# Patient Record
Sex: MALE | Race: WHITE | NOT HISPANIC OR LATINO | Employment: OTHER | URBAN - METROPOLITAN AREA
[De-identification: names, ages, dates, MRNs, and addresses within clinical notes are randomized per-mention and may not be internally consistent; named-entity substitution may affect disease eponyms.]

---

## 2018-12-31 ENCOUNTER — APPOINTMENT (OUTPATIENT)
Dept: RADIOLOGY | Facility: MEDICAL CENTER | Age: 83
DRG: 065 | End: 2018-12-31
Attending: PSYCHIATRY & NEUROLOGY
Payer: MEDICARE

## 2018-12-31 ENCOUNTER — APPOINTMENT (OUTPATIENT)
Dept: RADIOLOGY | Facility: MEDICAL CENTER | Age: 83
DRG: 065 | End: 2018-12-31
Attending: EMERGENCY MEDICINE
Payer: MEDICARE

## 2018-12-31 ENCOUNTER — HOSPITAL ENCOUNTER (INPATIENT)
Facility: MEDICAL CENTER | Age: 83
LOS: 4 days | DRG: 065 | End: 2019-01-04
Attending: EMERGENCY MEDICINE | Admitting: HOSPITALIST
Payer: MEDICARE

## 2018-12-31 ENCOUNTER — APPOINTMENT (OUTPATIENT)
Dept: RADIOLOGY | Facility: MEDICAL CENTER | Age: 83
DRG: 065 | End: 2018-12-31
Payer: MEDICARE

## 2018-12-31 DIAGNOSIS — I62.9 INTRACRANIAL BLEEDING (HCC): ICD-10-CM

## 2018-12-31 DIAGNOSIS — D69.6 THROMBOCYTOPENIA (HCC): ICD-10-CM

## 2018-12-31 DIAGNOSIS — I62.9 INTRACRANIAL HEMORRHAGE (HCC): ICD-10-CM

## 2018-12-31 DIAGNOSIS — R40.4 ALTERED LEVEL OF CONSCIOUSNESS: ICD-10-CM

## 2018-12-31 PROBLEM — E80.6 HYPERBILIRUBINEMIA: Status: ACTIVE | Noted: 2018-12-31

## 2018-12-31 PROBLEM — E78.5 DYSLIPIDEMIA: Status: ACTIVE | Noted: 2018-12-31

## 2018-12-31 PROBLEM — K21.9 GERD (GASTROESOPHAGEAL REFLUX DISEASE): Status: ACTIVE | Noted: 2018-12-31

## 2018-12-31 LAB
ABO GROUP BLD: NORMAL
ABO GROUP BLD: NORMAL
ALBUMIN SERPL BCP-MCNC: 4.7 G/DL (ref 3.2–4.9)
ALBUMIN/GLOB SERPL: 1.9 G/DL
ALP SERPL-CCNC: 56 U/L (ref 30–99)
ALT SERPL-CCNC: 22 U/L (ref 2–50)
ANION GAP SERPL CALC-SCNC: 7 MMOL/L (ref 0–11.9)
APPEARANCE UR: CLEAR
APTT PPP: 37.3 SEC (ref 24.7–36)
AST SERPL-CCNC: 12 U/L (ref 12–45)
BASOPHILS # BLD AUTO: 0.3 % (ref 0–1.8)
BASOPHILS # BLD: 0.02 K/UL (ref 0–0.12)
BILIRUB SERPL-MCNC: 2.7 MG/DL (ref 0.1–1.5)
BILIRUB UR QL STRIP.AUTO: NEGATIVE
BLD GP AB SCN SERPL QL: NORMAL
BUN SERPL-MCNC: 17 MG/DL (ref 8–22)
CALCIUM SERPL-MCNC: 9.6 MG/DL (ref 8.5–10.5)
CFT BLD TEG: 6.8 MIN (ref 5–10)
CHLORIDE SERPL-SCNC: 104 MMOL/L (ref 96–112)
CHOLEST SERPL-MCNC: 114 MG/DL (ref 100–199)
CLOT ANGLE BLD TEG: 49.8 DEGREES (ref 53–72)
CLOT LYSIS 30M P MA LENFR BLD TEG: 0 % (ref 0–8)
CO2 SERPL-SCNC: 29 MMOL/L (ref 20–33)
COLOR UR: YELLOW
CREAT SERPL-MCNC: 0.94 MG/DL (ref 0.5–1.4)
CT.EXTRINSIC BLD ROTEM: 3.5 MIN (ref 1–3)
EKG IMPRESSION: NORMAL
EOSINOPHIL # BLD AUTO: 0.01 K/UL (ref 0–0.51)
EOSINOPHIL NFR BLD: 0.2 % (ref 0–6.9)
ERYTHROCYTE [DISTWIDTH] IN BLOOD BY AUTOMATED COUNT: 40.6 FL (ref 35.9–50)
GLOBULIN SER CALC-MCNC: 2.5 G/DL (ref 1.9–3.5)
GLUCOSE BLD-MCNC: 109 MG/DL (ref 65–99)
GLUCOSE SERPL-MCNC: 105 MG/DL (ref 65–99)
GLUCOSE UR STRIP.AUTO-MCNC: NEGATIVE MG/DL
HCT VFR BLD AUTO: 50.3 % (ref 42–52)
HDLC SERPL-MCNC: 37 MG/DL
HGB BLD-MCNC: 17.2 G/DL (ref 14–18)
IMM GRANULOCYTES # BLD AUTO: 0.05 K/UL (ref 0–0.11)
IMM GRANULOCYTES NFR BLD AUTO: 0.9 % (ref 0–0.9)
INR PPP: 1.13 (ref 0.87–1.13)
KETONES UR STRIP.AUTO-MCNC: NEGATIVE MG/DL
LDLC SERPL CALC-MCNC: 55 MG/DL
LEUKOCYTE ESTERASE UR QL STRIP.AUTO: NEGATIVE
LYMPHOCYTES # BLD AUTO: 1.68 K/UL (ref 1–4.8)
LYMPHOCYTES NFR BLD: 28.9 % (ref 22–41)
MCF BLD TEG: 53.1 MM (ref 50–70)
MCH RBC QN AUTO: 30.4 PG (ref 27–33)
MCHC RBC AUTO-ENTMCNC: 34.2 G/DL (ref 33.7–35.3)
MCV RBC AUTO: 88.9 FL (ref 81.4–97.8)
MICRO URNS: NORMAL
MONOCYTES # BLD AUTO: 1.63 K/UL (ref 0–0.85)
MONOCYTES NFR BLD AUTO: 28 % (ref 0–13.4)
NEUTROPHILS # BLD AUTO: 2.43 K/UL (ref 1.82–7.42)
NEUTROPHILS NFR BLD: 41.7 % (ref 44–72)
NITRITE UR QL STRIP.AUTO: NEGATIVE
NRBC # BLD AUTO: 0 K/UL
NRBC BLD-RTO: 0 /100 WBC
PA AA BLD-ACNC: 0.2 %
PA ADP BLD-ACNC: 29.5 %
PH UR STRIP.AUTO: 5.5 [PH]
PLATELET # BLD AUTO: 95 K/UL (ref 164–446)
PMV BLD AUTO: 10.3 FL (ref 9–12.9)
POTASSIUM SERPL-SCNC: 4.2 MMOL/L (ref 3.6–5.5)
PROT SERPL-MCNC: 7.2 G/DL (ref 6–8.2)
PROT UR QL STRIP: NEGATIVE MG/DL
PROTHROMBIN TIME: 14.6 SEC (ref 12–14.6)
RBC # BLD AUTO: 5.66 M/UL (ref 4.7–6.1)
RBC UR QL AUTO: NEGATIVE
RH BLD: NORMAL
RH BLD: NORMAL
SODIUM SERPL-SCNC: 140 MMOL/L (ref 135–145)
SODIUM SERPL-SCNC: 141 MMOL/L (ref 135–145)
SP GR UR STRIP.AUTO: 1.03
TEG ALGORITHM TGALG: ABNORMAL
TRIGL SERPL-MCNC: 111 MG/DL (ref 0–149)
TROPONIN I SERPL-MCNC: 0.01 NG/ML (ref 0–0.04)
UROBILINOGEN UR STRIP.AUTO-MCNC: 0.2 MG/DL
WBC # BLD AUTO: 5.8 K/UL (ref 4.8–10.8)

## 2018-12-31 PROCEDURE — 85610 PROTHROMBIN TIME: CPT

## 2018-12-31 PROCEDURE — 94760 N-INVAS EAR/PLS OXIMETRY 1: CPT

## 2018-12-31 PROCEDURE — 36415 COLL VENOUS BLD VENIPUNCTURE: CPT

## 2018-12-31 PROCEDURE — 700117 HCHG RX CONTRAST REV CODE 255: Performed by: EMERGENCY MEDICINE

## 2018-12-31 PROCEDURE — 4A00X4Z MEASUREMENT OF CENTRAL NERVOUS ELECTRICAL ACTIVITY, EXTERNAL APPROACH: ICD-10-PCS | Performed by: PSYCHIATRY & NEUROLOGY

## 2018-12-31 PROCEDURE — 93005 ELECTROCARDIOGRAM TRACING: CPT | Performed by: EMERGENCY MEDICINE

## 2018-12-31 PROCEDURE — A9585 GADOBUTROL INJECTION: HCPCS | Performed by: EMERGENCY MEDICINE

## 2018-12-31 PROCEDURE — 84484 ASSAY OF TROPONIN QUANT: CPT

## 2018-12-31 PROCEDURE — 70498 CT ANGIOGRAPHY NECK: CPT

## 2018-12-31 PROCEDURE — 86850 RBC ANTIBODY SCREEN: CPT

## 2018-12-31 PROCEDURE — 95951 EEG: CPT | Mod: 52 | Performed by: PSYCHIATRY & NEUROLOGY

## 2018-12-31 PROCEDURE — 85347 COAGULATION TIME ACTIVATED: CPT

## 2018-12-31 PROCEDURE — 700111 HCHG RX REV CODE 636 W/ 250 OVERRIDE (IP): Performed by: HOSPITALIST

## 2018-12-31 PROCEDURE — 71045 X-RAY EXAM CHEST 1 VIEW: CPT

## 2018-12-31 PROCEDURE — 99223 1ST HOSP IP/OBS HIGH 75: CPT | Performed by: HOSPITALIST

## 2018-12-31 PROCEDURE — 99221 1ST HOSP IP/OBS SF/LOW 40: CPT | Performed by: PSYCHIATRY & NEUROLOGY

## 2018-12-31 PROCEDURE — 96374 THER/PROPH/DIAG INJ IV PUSH: CPT

## 2018-12-31 PROCEDURE — 85384 FIBRINOGEN ACTIVITY: CPT

## 2018-12-31 PROCEDURE — 85576 BLOOD PLATELET AGGREGATION: CPT | Mod: 91

## 2018-12-31 PROCEDURE — 700105 HCHG RX REV CODE 258: Performed by: HOSPITALIST

## 2018-12-31 PROCEDURE — 85025 COMPLETE CBC W/AUTO DIFF WBC: CPT

## 2018-12-31 PROCEDURE — 70496 CT ANGIOGRAPHY HEAD: CPT

## 2018-12-31 PROCEDURE — 80061 LIPID PANEL: CPT

## 2018-12-31 PROCEDURE — 84295 ASSAY OF SERUM SODIUM: CPT

## 2018-12-31 PROCEDURE — 99291 CRITICAL CARE FIRST HOUR: CPT

## 2018-12-31 PROCEDURE — 70450 CT HEAD/BRAIN W/O DYE: CPT

## 2018-12-31 PROCEDURE — 86901 BLOOD TYPING SEROLOGIC RH(D): CPT

## 2018-12-31 PROCEDURE — 80053 COMPREHEN METABOLIC PANEL: CPT

## 2018-12-31 PROCEDURE — 82962 GLUCOSE BLOOD TEST: CPT

## 2018-12-31 PROCEDURE — 85730 THROMBOPLASTIN TIME PARTIAL: CPT

## 2018-12-31 PROCEDURE — 81003 URINALYSIS AUTO W/O SCOPE: CPT

## 2018-12-31 PROCEDURE — 95951 EEG: CPT | Mod: 26,52 | Performed by: PSYCHIATRY & NEUROLOGY

## 2018-12-31 PROCEDURE — 86900 BLOOD TYPING SEROLOGIC ABO: CPT

## 2018-12-31 PROCEDURE — 70553 MRI BRAIN STEM W/O & W/DYE: CPT

## 2018-12-31 PROCEDURE — 770022 HCHG ROOM/CARE - ICU (200)

## 2018-12-31 RX ORDER — HYDRALAZINE HYDROCHLORIDE 20 MG/ML
10 INJECTION INTRAMUSCULAR; INTRAVENOUS EVERY 4 HOURS PRN
Status: DISCONTINUED | OUTPATIENT
Start: 2018-12-31 | End: 2019-01-04 | Stop reason: HOSPADM

## 2018-12-31 RX ORDER — ACETAMINOPHEN 325 MG/1
650 TABLET ORAL EVERY 4 HOURS PRN
Status: DISCONTINUED | OUTPATIENT
Start: 2018-12-31 | End: 2019-01-04 | Stop reason: HOSPADM

## 2018-12-31 RX ORDER — AMOXICILLIN 250 MG
2 CAPSULE ORAL 2 TIMES DAILY
Status: DISCONTINUED | OUTPATIENT
Start: 2018-12-31 | End: 2018-12-31

## 2018-12-31 RX ORDER — AMOXICILLIN 250 MG
2 CAPSULE ORAL 2 TIMES DAILY
Status: DISCONTINUED | OUTPATIENT
Start: 2018-12-31 | End: 2019-01-04 | Stop reason: HOSPADM

## 2018-12-31 RX ORDER — ACETAMINOPHEN 650 MG/1
650 SUPPOSITORY RECTAL EVERY 4 HOURS PRN
Status: DISCONTINUED | OUTPATIENT
Start: 2018-12-31 | End: 2019-01-04 | Stop reason: HOSPADM

## 2018-12-31 RX ORDER — ATORVASTATIN CALCIUM 10 MG/1
10 TABLET, FILM COATED ORAL DAILY
Status: DISCONTINUED | OUTPATIENT
Start: 2019-01-01 | End: 2019-01-04 | Stop reason: HOSPADM

## 2018-12-31 RX ORDER — ONDANSETRON 4 MG/1
4 TABLET, ORALLY DISINTEGRATING ORAL EVERY 4 HOURS PRN
Status: DISCONTINUED | OUTPATIENT
Start: 2018-12-31 | End: 2019-01-04 | Stop reason: HOSPADM

## 2018-12-31 RX ORDER — LORAZEPAM 2 MG/ML
2 INJECTION INTRAMUSCULAR
Status: DISCONTINUED | OUTPATIENT
Start: 2018-12-31 | End: 2019-01-04 | Stop reason: HOSPADM

## 2018-12-31 RX ORDER — GADOBUTROL 604.72 MG/ML
7 INJECTION INTRAVENOUS ONCE
Status: COMPLETED | OUTPATIENT
Start: 2018-12-31 | End: 2018-12-31

## 2018-12-31 RX ORDER — ATORVASTATIN CALCIUM 10 MG/1
10 TABLET, FILM COATED ORAL DAILY
COMMUNITY
End: 2023-06-28 | Stop reason: SDUPTHER

## 2018-12-31 RX ORDER — BISACODYL 10 MG
10 SUPPOSITORY, RECTAL RECTAL
Status: DISCONTINUED | OUTPATIENT
Start: 2018-12-31 | End: 2018-12-31

## 2018-12-31 RX ORDER — ACETAMINOPHEN 325 MG/1
650 TABLET ORAL EVERY 6 HOURS PRN
Status: DISCONTINUED | OUTPATIENT
Start: 2018-12-31 | End: 2018-12-31

## 2018-12-31 RX ORDER — POLYETHYLENE GLYCOL 3350 17 G/17G
1 POWDER, FOR SOLUTION ORAL
Status: DISCONTINUED | OUTPATIENT
Start: 2018-12-31 | End: 2019-01-04 | Stop reason: HOSPADM

## 2018-12-31 RX ORDER — LABETALOL HYDROCHLORIDE 5 MG/ML
10 INJECTION, SOLUTION INTRAVENOUS EVERY 4 HOURS PRN
Status: DISCONTINUED | OUTPATIENT
Start: 2018-12-31 | End: 2018-12-31

## 2018-12-31 RX ORDER — SODIUM CHLORIDE 9 MG/ML
INJECTION, SOLUTION INTRAVENOUS CONTINUOUS
Status: DISCONTINUED | OUTPATIENT
Start: 2018-12-31 | End: 2019-01-01

## 2018-12-31 RX ORDER — SODIUM CHLORIDE 9 MG/ML
INJECTION, SOLUTION INTRAVENOUS CONTINUOUS
Status: DISCONTINUED | OUTPATIENT
Start: 2018-12-31 | End: 2018-12-31

## 2018-12-31 RX ORDER — ONDANSETRON 2 MG/ML
4 INJECTION INTRAMUSCULAR; INTRAVENOUS EVERY 4 HOURS PRN
Status: DISCONTINUED | OUTPATIENT
Start: 2018-12-31 | End: 2019-01-04 | Stop reason: HOSPADM

## 2018-12-31 RX ORDER — BISACODYL 10 MG
10 SUPPOSITORY, RECTAL RECTAL
Status: DISCONTINUED | OUTPATIENT
Start: 2018-12-31 | End: 2019-01-04 | Stop reason: HOSPADM

## 2018-12-31 RX ORDER — ONDANSETRON 4 MG/1
4 TABLET, ORALLY DISINTEGRATING ORAL EVERY 4 HOURS PRN
Status: DISCONTINUED | OUTPATIENT
Start: 2018-12-31 | End: 2018-12-31

## 2018-12-31 RX ORDER — OMEPRAZOLE 20 MG/1
20 CAPSULE, DELAYED RELEASE ORAL DAILY
Status: DISCONTINUED | OUTPATIENT
Start: 2019-01-01 | End: 2019-01-04 | Stop reason: HOSPADM

## 2018-12-31 RX ORDER — ONDANSETRON 2 MG/ML
4 INJECTION INTRAMUSCULAR; INTRAVENOUS EVERY 4 HOURS PRN
Status: DISCONTINUED | OUTPATIENT
Start: 2018-12-31 | End: 2018-12-31

## 2018-12-31 RX ORDER — ENALAPRILAT 1.25 MG/ML
1.25 INJECTION INTRAVENOUS EVERY 6 HOURS PRN
Status: DISCONTINUED | OUTPATIENT
Start: 2018-12-31 | End: 2019-01-04 | Stop reason: HOSPADM

## 2018-12-31 RX ORDER — POLYETHYLENE GLYCOL 3350 17 G/17G
1 POWDER, FOR SOLUTION ORAL
Status: DISCONTINUED | OUTPATIENT
Start: 2018-12-31 | End: 2018-12-31

## 2018-12-31 RX ADMIN — IOHEXOL 100 ML: 350 INJECTION, SOLUTION INTRAVENOUS at 13:29

## 2018-12-31 RX ADMIN — SODIUM CHLORIDE: 9 INJECTION, SOLUTION INTRAVENOUS at 19:18

## 2018-12-31 RX ADMIN — SODIUM CHLORIDE 500 MG: 9 INJECTION, SOLUTION INTRAVENOUS at 19:15

## 2018-12-31 RX ADMIN — GADOBUTROL 7 ML: 604.72 INJECTION INTRAVENOUS at 18:48

## 2018-12-31 ASSESSMENT — PAIN SCALES - GENERAL
PAINLEVEL_OUTOF10: 0
PAINLEVEL_OUTOF10: 0

## 2018-12-31 ASSESSMENT — LIFESTYLE VARIABLES: DO YOU DRINK ALCOHOL: NO

## 2018-12-31 NOTE — ED PROVIDER NOTES
ED Provider Note    Scribed for Renata Webb M.D. by Oskar Stephens. 12/31/2018, 1:09 PM.    Primary care provider: Finn Cummins D.O.  Means of arrival: Walk in  History obtained from: Patient and Patient's Wife  History limited by: None    CHIEF COMPLAINT  Chief Complaint   Patient presents with   • Headache     sudden onset of headache approx 2 hours. denies blurred vision or n/v    • Altered Mental Status     unable to remember names        HPI  Elias Vincent is a 83 y.o. 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 The patient reports at 11 AM today he developed an acute severe headache. 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 The patient denies associated nausea or vomiting with the incident and states he remembers the entire events. His wife denies any slurred speech at that time, and the patient reports his headache has now improved. No alleviating or exacerbating factors are identified at this time. He has no history of diabetes, hypertension, hyperlipidemia, or cardiovascular disease. The patient reports no recent head trauma.     REVIEW OF SYSTEMS  Pertinent positives include headache and aphasia. Pertinent negatives include no slurred speech. As above, all other systems reviewed and are negative.   See HPI for further details.     PAST MEDICAL HISTORY  No hypertension  No diabetes    SURGICAL HISTORY  Past Surgical History:   Procedure Laterality Date   • APPENDECTOMY     • CHOLECYSTECTOMY         SOCIAL HISTORY  Social History   Substance Use Topics   • Smoking status: Never Smoker   • Alcohol use No      History   Drug Use No       FAMILY HISTORY  No pertinent family history noted.    CURRENT MEDICATIONS  Home Medications     Reviewed by Zehra Brewer R.N. (Registered Nurse) on 12/31/18 at 1303  Med List Status: Partial   Medication Last Dose Status   omeprazole (PRILOSEC) 20 MG  CPDR 12/31/2018 Active                ALLERGIES  No Known Allergies    PHYSICAL EXAM  VITAL SIGNS: /84   Pulse 63   Temp 36.6 °C (97.8 °F) (Temporal)   Resp 18   Wt 71.8 kg (158 lb 4.6 oz)   BMI 24.79 kg/m²   Vitals reviewed.  Consitutional: Well-developed, well-nourished. Negative for: distress.  HENT: Normocephalic, right external ear normal, left external ear normal, oropharynx clear and moist.  Eyes: PERRLA at 3 mm. Conjunctivae normal, extraocular movements normal. Negative for: discharge in right and left eye, icterus.  Neck: Range of motion normal, supple. Negative for cervical adenopathy.  Cardiovascular: Normal rate, regular rhythm, heart sounds normal, intact distal pulses. Negative for: murmur, rub, gallop.  Pulmonary/Chest Wall: Effort normal, breath sounds normal. Negative for: respiratory distress, wheezes, rales, rhonchi.   Abdominal: Soft, bowel sounds normal. Negative for: distention, tenderness, rebound, guarding.  Musculoskeletal: Normal range of motion. Negative for edema.  Neurological: Alert and oriented x3. No focal deficits. 5/5 strength to bilateral upper and lower extremities.  Sensation intact to face, hands, and feet.  No drift.  DTRs 2+ bilateral patella.  Slight dysarthria with naming objects.  NIH of 1.  Skin: Warm, dry. Negative for rash.  Psych: Mood/affect normal, behavior normal, judgment normal.    DIAGNOSTIC STUDIES / PROCEDURES    LABS  Results for orders placed or performed during the hospital encounter of 12/31/18   CBC WITH DIFFERENTIAL   Result Value Ref Range    WBC 5.8 4.8 - 10.8 K/uL    RBC 5.66 4.70 - 6.10 M/uL    Hemoglobin 17.2 14.0 - 18.0 g/dL    Hematocrit 50.3 42.0 - 52.0 %    MCV 88.9 81.4 - 97.8 fL    MCH 30.4 27.0 - 33.0 pg    MCHC 34.2 33.7 - 35.3 g/dL    RDW 40.6 35.9 - 50.0 fL    Platelet Count 95 (L) 164 - 446 K/uL    MPV 10.3 9.0 - 12.9 fL    Neutrophils-Polys 41.70 (L) 44.00 - 72.00 %    Lymphocytes 28.90 22.00 - 41.00 %    Monocytes 28.00 (H)  0.00 - 13.40 %    Eosinophils 0.20 0.00 - 6.90 %    Basophils 0.30 0.00 - 1.80 %    Immature Granulocytes 0.90 0.00 - 0.90 %    Nucleated RBC 0.00 /100 WBC    Neutrophils (Absolute) 2.43 1.82 - 7.42 K/uL    Lymphs (Absolute) 1.68 1.00 - 4.80 K/uL    Monos (Absolute) 1.63 (H) 0.00 - 0.85 K/uL    Eos (Absolute) 0.01 0.00 - 0.51 K/uL    Baso (Absolute) 0.02 0.00 - 0.12 K/uL    Immature Granulocytes (abs) 0.05 0.00 - 0.11 K/uL    NRBC (Absolute) 0.00 K/uL   COMP METABOLIC PANEL   Result Value Ref Range    Sodium 140 135 - 145 mmol/L    Potassium 4.2 3.6 - 5.5 mmol/L    Chloride 104 96 - 112 mmol/L    Co2 29 20 - 33 mmol/L    Anion Gap 7.0 0.0 - 11.9    Glucose 105 (H) 65 - 99 mg/dL    Bun 17 8 - 22 mg/dL    Creatinine 0.94 0.50 - 1.40 mg/dL    Calcium 9.6 8.5 - 10.5 mg/dL    AST(SGOT) 12 12 - 45 U/L    ALT(SGPT) 22 2 - 50 U/L    Alkaline Phosphatase 56 30 - 99 U/L    Total Bilirubin 2.7 (H) 0.1 - 1.5 mg/dL    Albumin 4.7 3.2 - 4.9 g/dL    Total Protein 7.2 6.0 - 8.2 g/dL    Globulin 2.5 1.9 - 3.5 g/dL    A-G Ratio 1.9 g/dL   PROTHROMBIN TIME   Result Value Ref Range    PT 14.6 12.0 - 14.6 sec    INR 1.13 0.87 - 1.13   APTT   Result Value Ref Range    APTT 37.3 (H) 24.7 - 36.0 sec   COD (ADULT)   Result Value Ref Range    ABO Grouping Only O     Rh Grouping Only POS     Antibody Screen-Cod NEG    TROPONIN   Result Value Ref Range    Troponin I 0.01 0.00 - 0.04 ng/mL   ESTIMATED GFR   Result Value Ref Range    GFR If African American >60 >60 mL/min/1.73 m 2    GFR If Non African American >60 >60 mL/min/1.73 m 2   ACCU-CHEK GLUCOSE   Result Value Ref Range    Glucose - Accu-Ck 109 (H) 65 - 99 mg/dL   EKG (NOW)   Result Value Ref Range    Report       [facility] Emergency Dept.    Test Date:  2018  Pt Name:    OTTO MIDDLETON               Department: ER  MRN:        [id number]                      Room:       [facility]  Gender:     Male                         Technician: [id number]  :        [id number]                    Requested By:ER TRIAGE PROTOCOL  Order #:    [id number]                    Reading MD:    Measurements  Intervals                                Axis  Rate:       57                           P:          55  NH:         136                          QRS:        -56  QRSD:       100                          T:          -19  QT:         428  QTc:        417    Interpretive Statements  SINUS BRADYCARDIA  LEFT ANTERIOR FASCICULAR BLOCK  ABNRM R PROG, CONSIDER ASMI OR LEAD PLACEMENT  ABNORMAL T, CONSIDER ISCHEMIA, INFERIOR LEADS  No previous ECG available for comparison       All labs reviewed by me.    EKG Interpretation:  Twelve-lead EKG by my interpretation shows sinus bradycardia at a rate of 57.  Left anterior fascicular block.  Inferior T wave inversions may indicate ischemia but there are no previous EKGs with which to compare this.  No ST  changes to indicate ischemia or infarct    RADIOLOGY  DX-CHEST-PORTABLE (1 VIEW)   Final Result         1. No acute cardiopulmonary abnormalities are identified.      CT-HEAD W/O   Final Result         1. There is hemorrhage within the temporal horn of the left lateral ventricle. No dilatation of the lateral ventricles.      CRITICAL RESULT READ BACK: Preliminary findings discussed with and critical read back performed by Dr. CHRISTINA NOLAND in the Emergency Department via telephone on 12/31/2018 1:35 PM         CT-CTA NECK WITH & W/O-POST PROCESSING   Final Result      CT angiogram of the neck within normal limits.   Hemorrhage within the left lateral ventricle.      CT-CTA HEAD WITH & W/O-POST PROCESS   Final Result         1. No aneurysm or hemodynamically significant narrowing of the major intracranial vessels.        The radiologist's interpretation of all radiological studies have been reviewed by me.    COURSE & MEDICAL DECISION MAKING  Nursing notes, VS, PMSFHx reviewed in chart.    1:09 PM Patient seen and examined at bedside. The patient presents with aphasia  and headache and the differential diagnosis includes but is not limited to stroke, bleed, tumor. Ordered DX-Chest, CT-Head, CT-Cerebral Perfusion, CT-CTA head, CT-CTA neck, CBC with differential, CMP, PTT, APTT, COD, Troponin, Urinalysis, ABO and RH Confirmation, and EKG. NIH determined to be 1 at this time    1:23 PM - Intracranial bleed noted on CT, therefore patient is not an alteplase candidate. Consult with Dr. Hidalgo, Neurology, who will see the patient.     1:40 PM - Patient reevaluated at bedside. He continues to have NIH of 1 at this time. Patient and his wife were updated with CT imaging that indicate intracranial bleeding. He was informed he will be admitted and is agreeable with plan for admit.    2:05 PM - Consult with Dr. Shadia Rivas, Hospitalist, who agrees to admit the patient.    4:05 PM - Paged Neurosurgery.     4:11 PM - Consult with Dr. Hood, Neurosurgery, who will see the patient.     CRITICAL CARE  The very real possibilty of a deterioration of this patient's condition required the highest level of my preparedness for sudden, emergent intervention.  I provided critical care services, which included medication orders, frequent reevaluations of the patient's condition and response to treatment, ordering and reviewing test results, and discussing the case with various consultants.  The critical care time associated with the care of the patient was 40 minutes. Review chart for interventions. This time is exclusive of any other billable procedures.     DISPOSITION:  Patient will be admitted to Dr. Shadia Rivas, Hospitalist, in guarded condition.    FINAL IMPRESSION  1. Intracranial bleeding (HCC)    2. Altered level of consciousness    3. Thrombocytopenia (HCC)      The critical care time associated with the care of the patient was 40 minutes. Review chart for interventions. This time is exclusive of any other billable procedures.      I, Oskar Stephens (Geraldoibe), am scribing for, and in the presence of,  Renata Webb M.D..    Electronically signed by: Oskar Stephens (Scribe), 12/31/2018    IRenata M.D. personally performed the services described in this documentation, as scribed by Oskar Stephens in my presence, and it is both accurate and complete.    C.    The note accurately reflects work and decisions made by me.  Renata Webb  12/31/2018  4:37 PM

## 2018-12-31 NOTE — ED TRIAGE NOTES
Pt to triage .  Chief Complaint   Patient presents with   • Headache     sudden onset of headache approx 2 hours. denies blurred vision or n/v    • Altered Mental Status     unable to remember names    charge RN contacted, Stroke protocol activated

## 2018-12-31 NOTE — CONSULTS
CC:  Stroke code    Date of Admission: 12/31/2018    Today's Date: 12/31/18    Consulting Physician: Renata Wbeb M.D.      HPI:    Elias Vincent is a 83 y.o. male today suddenly at 11 am developed severe headache and difficulty remembering reason for brought to ER as stroke code.  Stroke code at 1:03 ; CT head done at 1:20 pm and which demonstrated a ventricular Left temporal bleed.    ICH score:1  Tina: 3-4(2)  5-12(1)  13-15(0)= 0  Age >80:0  Infratentorial :0  Volume >30: no   Intraventricular: yes.    Very small 4.5 cc Volume in the Left temporal lobe + IV bleed , of ? Etiology.  Per patient and wife, no risk factors, no health issues except hyperlipidemia, nothing new added to treatment        ROS:   Pert pos: headache, and as in HPI  Also gerd.  Pert neg: no chest pain, SOB, no cough , no vomiting, no fever.      Past Medical History: Hld, and GERD.    Past Surgical History:   Past Surgical History:   Procedure Laterality Date   • APPENDECTOMY     • CHOLECYSTECTOMY         Social History:   Social History     Social History   • Marital status:      Spouse name: N/A   • Number of children: N/A   • Years of education: N/A     Occupational History   • Not on file.     Social History Main Topics   • Smoking status: Never Smoker   • Smokeless tobacco: Not on file   • Alcohol use No   • Drug use: No   • Sexual activity: Not on file     Other Topics Concern   • Not on file     Social History Narrative   • No narrative on file       Family History: No stroke, no bleeds, no seizures.    Allergies: No Known Allergies    No current facility-administered medications for this encounter.     Current Outpatient Prescriptions:   •  omeprazole (PRILOSEC) 20 MG CPDR, Take 20 mg by mouth every day., Disp: , Rfl:       PHYSICAL EXAM    Vitals:    12/31/18 1253 12/31/18 1259   BP: 156/84    Pulse: 63    Resp: 18    Temp: 36.6 °C (97.8 °F)    TempSrc: Temporal    Weight:  71.8 kg (158 lb 4.6 oz)       Constitutional:  thin, no acute distress.    Head/Neck: NCAT. no meningismus neg kernig neg brudzinski. No obvious mass or heard bruit. No tender arteries or lost pulses. No rash of head or neck.    Cardiovascular: Regular, rhythmic    Pulmonary: Normal breath sounds    Extremities: Normal inspection, No edema, normal pulses    Skin: Warm, dry, intact. No rashes observed head/neck or body  No stigmata    Eyes/Funduscopic: normal conjunctiva    Psych: anxiety     Mental Status: Awake, alert, oriented x 3. Name/repeat/fluent/command follows. No neglect/extinction. Attention and concentration normal.  Mild memory impairment , mostly with dates, and with object naming.    Cranial Nerves: CN II-XII intact. Pupillary reflex +4  No afferent pupillary defect. EOM full;  VF full; No nystagmus.       Motor:  5/5,      Sensory: symmetric to all modalities.  Coordination: dysmetria absent    DTR's: + ;  no clonus.    Babinski: neg    Gait/Station: n/a fall concern             Labs:  Recent Labs      12/31/18   1304   WBC  5.8   RBC  5.66   HEMOGLOBIN  17.2   HEMATOCRIT  50.3   MCV  88.9   MCH  30.4   MCHC  34.2   RDW  40.6   PLATELETCT  95*   MPV  10.3                         No results for input(s): SODIUM, POTASSIUM, CHLORIDE, CO2, GLUCOSE, BUN, CPKTOTAL in the last 72 hours.  No results for input(s): SODIUM, POTASSIUM, CHLORIDE, CO2, BUN, CREATININE, MAGNESIUM, PHOSPHORUS, CALCIUM in the last 72 hours.      No results found for this or any previous visit.           Imaging: neuroimaging reviewed and directly visualized by me  DX-CHEST-PORTABLE (1 VIEW)    (Results Pending)   CT-HEAD W/O    (Results Pending)   CT-CEREBRAL PERFUSION ANALYSIS    (Results Pending)   CT-CTA HEAD WITH & W/O-POST PROCESS    (Results Pending)   CT-CTA NECK WITH & W/O-POST PROCESSING    (Results Pending)       Assessment/Plan:    Left Temporal IV ICH : of ? Etiology; could be possible cerebral amyloid.  Will recommend MRI w /wo contrast, to see if any evidence for  meningeal infection? Inflamation.  CTA did not showed any aneurysm, and patient IS NOT HTN  Urine drug screen needs to be done.  EEG to be done as well, due to persistent altered memory, and naming difficulties.  Patient was not taking any NSAIDs, or antiplatelets at home.  Continue patient own statins.    Encephalopathy: related with Bleed, EEG to exclude seizure.        ICH education, revision of risk factors, and discussion with nursing about my findings were done .          Vanessa Chiu M.D.    Clinical Professor, [employer]  Diplomate, Neurology , Vascular Neurology, Neurophysiology

## 2019-01-01 PROBLEM — I15.9 SECONDARY HYPERTENSION: Status: ACTIVE | Noted: 2019-01-01

## 2019-01-01 PROBLEM — D69.6 THROMBOCYTOPENIA (HCC): Status: ACTIVE | Noted: 2019-01-01

## 2019-01-01 LAB
ALBUMIN SERPL BCP-MCNC: 4.2 G/DL (ref 3.2–4.9)
ALP SERPL-CCNC: 50 U/L (ref 30–99)
ALT SERPL-CCNC: 18 U/L (ref 2–50)
ANION GAP SERPL CALC-SCNC: 9 MMOL/L (ref 0–11.9)
AST SERPL-CCNC: 13 U/L (ref 12–45)
BASOPHILS # BLD AUTO: 0.3 % (ref 0–1.8)
BASOPHILS # BLD: 0.02 K/UL (ref 0–0.12)
BILIRUB CONJ SERPL-MCNC: 0.3 MG/DL (ref 0.1–0.5)
BILIRUB INDIRECT SERPL-MCNC: 2.5 MG/DL (ref 0–1)
BILIRUB SERPL-MCNC: 2.8 MG/DL (ref 0.1–1.5)
BUN SERPL-MCNC: 14 MG/DL (ref 8–22)
CALCIUM SERPL-MCNC: 9.5 MG/DL (ref 8.5–10.5)
CHLORIDE SERPL-SCNC: 107 MMOL/L (ref 96–112)
CO2 SERPL-SCNC: 25 MMOL/L (ref 20–33)
CREAT SERPL-MCNC: 0.91 MG/DL (ref 0.5–1.4)
EOSINOPHIL # BLD AUTO: 0.02 K/UL (ref 0–0.51)
EOSINOPHIL NFR BLD: 0.3 % (ref 0–6.9)
ERYTHROCYTE [DISTWIDTH] IN BLOOD BY AUTOMATED COUNT: 40.7 FL (ref 35.9–50)
ERYTHROCYTE [SEDIMENTATION RATE] IN BLOOD BY WESTERGREN METHOD: 2 MM/HOUR (ref 0–20)
GLUCOSE SERPL-MCNC: 94 MG/DL (ref 65–99)
HCT VFR BLD AUTO: 49.2 % (ref 42–52)
HGB BLD-MCNC: 16.5 G/DL (ref 14–18)
IMM GRANULOCYTES # BLD AUTO: 0.03 K/UL (ref 0–0.11)
IMM GRANULOCYTES NFR BLD AUTO: 0.4 % (ref 0–0.9)
LYMPHOCYTES # BLD AUTO: 2.24 K/UL (ref 1–4.8)
LYMPHOCYTES NFR BLD: 30.9 % (ref 22–41)
MCH RBC QN AUTO: 29.8 PG (ref 27–33)
MCHC RBC AUTO-ENTMCNC: 33.5 G/DL (ref 33.7–35.3)
MCV RBC AUTO: 88.8 FL (ref 81.4–97.8)
MONOCYTES # BLD AUTO: 1.97 K/UL (ref 0–0.85)
MONOCYTES NFR BLD AUTO: 27.1 % (ref 0–13.4)
NEUTROPHILS # BLD AUTO: 2.98 K/UL (ref 1.82–7.42)
NEUTROPHILS NFR BLD: 41 % (ref 44–72)
NRBC # BLD AUTO: 0 K/UL
NRBC BLD-RTO: 0 /100 WBC
PLATELET # BLD AUTO: 95 K/UL (ref 164–446)
PMV BLD AUTO: 11 FL (ref 9–12.9)
POTASSIUM SERPL-SCNC: 4 MMOL/L (ref 3.6–5.5)
PROT SERPL-MCNC: 6.4 G/DL (ref 6–8.2)
RBC # BLD AUTO: 5.54 M/UL (ref 4.7–6.1)
SODIUM SERPL-SCNC: 139 MMOL/L (ref 135–145)
SODIUM SERPL-SCNC: 141 MMOL/L (ref 135–145)
TSH SERPL DL<=0.005 MIU/L-ACNC: 1.23 UIU/ML (ref 0.38–5.33)
WBC # BLD AUTO: 7.3 K/UL (ref 4.8–10.8)

## 2019-01-01 PROCEDURE — A9270 NON-COVERED ITEM OR SERVICE: HCPCS | Performed by: PSYCHIATRY & NEUROLOGY

## 2019-01-01 PROCEDURE — 99223 1ST HOSP IP/OBS HIGH 75: CPT | Performed by: INTERNAL MEDICINE

## 2019-01-01 PROCEDURE — 86038 ANTINUCLEAR ANTIBODIES: CPT

## 2019-01-01 PROCEDURE — 84443 ASSAY THYROID STIM HORMONE: CPT

## 2019-01-01 PROCEDURE — 85025 COMPLETE CBC W/AUTO DIFF WBC: CPT

## 2019-01-01 PROCEDURE — 99232 SBSQ HOSP IP/OBS MODERATE 35: CPT | Performed by: PSYCHIATRY & NEUROLOGY

## 2019-01-01 PROCEDURE — 96105 ASSESSMENT OF APHASIA: CPT

## 2019-01-01 PROCEDURE — 99233 SBSQ HOSP IP/OBS HIGH 50: CPT | Performed by: HOSPITALIST

## 2019-01-01 PROCEDURE — 84295 ASSAY OF SERUM SODIUM: CPT

## 2019-01-01 PROCEDURE — 770020 HCHG ROOM/CARE - TELE (206)

## 2019-01-01 PROCEDURE — 97162 PT EVAL MOD COMPLEX 30 MIN: CPT

## 2019-01-01 PROCEDURE — 80076 HEPATIC FUNCTION PANEL: CPT

## 2019-01-01 PROCEDURE — 700111 HCHG RX REV CODE 636 W/ 250 OVERRIDE (IP): Performed by: HOSPITALIST

## 2019-01-01 PROCEDURE — 700102 HCHG RX REV CODE 250 W/ 637 OVERRIDE(OP): Performed by: PSYCHIATRY & NEUROLOGY

## 2019-01-01 PROCEDURE — 85652 RBC SED RATE AUTOMATED: CPT

## 2019-01-01 PROCEDURE — 700105 HCHG RX REV CODE 258: Performed by: HOSPITALIST

## 2019-01-01 PROCEDURE — 700102 HCHG RX REV CODE 250 W/ 637 OVERRIDE(OP): Performed by: HOSPITALIST

## 2019-01-01 PROCEDURE — 80048 BASIC METABOLIC PNL TOTAL CA: CPT

## 2019-01-01 PROCEDURE — 97165 OT EVAL LOW COMPLEX 30 MIN: CPT

## 2019-01-01 PROCEDURE — A9270 NON-COVERED ITEM OR SERVICE: HCPCS | Performed by: HOSPITALIST

## 2019-01-01 RX ORDER — LEVETIRACETAM 250 MG/1
500 TABLET ORAL 2 TIMES DAILY
Status: DISCONTINUED | OUTPATIENT
Start: 2019-01-01 | End: 2019-01-04 | Stop reason: HOSPADM

## 2019-01-01 RX ADMIN — STANDARDIZED SENNA CONCENTRATE AND DOCUSATE SODIUM 2 TABLET: 8.6; 5 TABLET, FILM COATED ORAL at 05:28

## 2019-01-01 RX ADMIN — SODIUM CHLORIDE: 9 INJECTION, SOLUTION INTRAVENOUS at 00:35

## 2019-01-01 RX ADMIN — STANDARDIZED SENNA CONCENTRATE AND DOCUSATE SODIUM 2 TABLET: 8.6; 5 TABLET, FILM COATED ORAL at 17:00

## 2019-01-01 RX ADMIN — LEVETIRACETAM 500 MG: 250 TABLET ORAL at 17:00

## 2019-01-01 RX ADMIN — ATORVASTATIN CALCIUM 10 MG: 10 TABLET, FILM COATED ORAL at 05:28

## 2019-01-01 RX ADMIN — OMEPRAZOLE 20 MG: 20 CAPSULE, DELAYED RELEASE ORAL at 05:28

## 2019-01-01 RX ADMIN — HYDRALAZINE HYDROCHLORIDE 10 MG: 20 INJECTION INTRAMUSCULAR; INTRAVENOUS at 00:39

## 2019-01-01 RX ADMIN — SODIUM CHLORIDE 500 MG: 9 INJECTION, SOLUTION INTRAVENOUS at 05:28

## 2019-01-01 ASSESSMENT — ENCOUNTER SYMPTOMS
FEVER: 0
NERVOUS/ANXIOUS: 0
SHORTNESS OF BREATH: 0
LIGHT-HEADEDNESS: 0
VOMITING: 0
CONFUSION: 0
FATIGUE: 0
AGITATION: 0
FACIAL ASYMMETRY: 0
PALPITATIONS: 0
DIZZINESS: 0
TREMORS: 0
ABDOMINAL PAIN: 0
COUGH: 0
NAUSEA: 0
BACK PAIN: 0
FLANK PAIN: 0
BRUISES/BLEEDS EASILY: 0
HEADACHES: 1
SPEECH DIFFICULTY: 1
ENDOCRINE NEGATIVE: 1
WEAKNESS: 0
NUMBNESS: 0
SORE THROAT: 0
SEIZURES: 0

## 2019-01-01 ASSESSMENT — PAIN SCALES - GENERAL
PAINLEVEL_OUTOF10: 0
PAINLEVEL_OUTOF10: 3
PAINLEVEL_OUTOF10: 0

## 2019-01-01 ASSESSMENT — COPD QUESTIONNAIRES
DO YOU EVER COUGH UP ANY MUCUS OR PHLEGM?: NO/ONLY WITH OCCASIONAL COLDS OR INFECTIONS
HAVE YOU SMOKED AT LEAST 100 CIGARETTES IN YOUR ENTIRE LIFE: NO/DON'T KNOW
IN THE PAST 12 MONTHS DO YOU DO LESS THAN YOU USED TO BECAUSE OF YOUR BREATHING PROBLEMS: DISAGREE/UNSURE
HAVE YOU SMOKED AT LEAST 100 CIGARETTES IN YOUR ENTIRE LIFE: NO/DON'T KNOW
COPD SCREENING SCORE: 2
DURING THE PAST 4 WEEKS HOW MUCH DID YOU FEEL SHORT OF BREATH: NONE/LITTLE OF THE TIME
DO YOU EVER COUGH UP ANY MUCUS OR PHLEGM?: NO/ONLY WITH OCCASIONAL COLDS OR INFECTIONS
COPD SCREENING SCORE: 2
DURING THE PAST 4 WEEKS HOW MUCH DID YOU FEEL SHORT OF BREATH: NONE/LITTLE OF THE TIME

## 2019-01-01 ASSESSMENT — COGNITIVE AND FUNCTIONAL STATUS - GENERAL
SUGGESTED CMS G CODE MODIFIER DAILY ACTIVITY: CH
SUGGESTED CMS G CODE MODIFIER MOBILITY: CH
SUGGESTED CMS G CODE MODIFIER DAILY ACTIVITY: CI
SUGGESTED CMS G CODE MODIFIER MOBILITY: CH
DAILY ACTIVITIY SCORE: 23
DAILY ACTIVITIY SCORE: 24
HELP NEEDED FOR BATHING: A LITTLE
MOBILITY SCORE: 24
MOBILITY SCORE: 24

## 2019-01-01 ASSESSMENT — LIFESTYLE VARIABLES
ALCOHOL_USE: NO
EVER_SMOKED: NEVER

## 2019-01-01 ASSESSMENT — PATIENT HEALTH QUESTIONNAIRE - PHQ9
SUM OF ALL RESPONSES TO PHQ9 QUESTIONS 1 AND 2: 0
2. FEELING DOWN, DEPRESSED, IRRITABLE, OR HOPELESS: NOT AT ALL
1. LITTLE INTEREST OR PLEASURE IN DOING THINGS: NOT AT ALL

## 2019-01-01 ASSESSMENT — GAIT ASSESSMENTS
GAIT LEVEL OF ASSIST: STAND BY ASSIST
DISTANCE (FEET): 200

## 2019-01-01 ASSESSMENT — ACTIVITIES OF DAILY LIVING (ADL): TOILETING: INDEPENDENT

## 2019-01-01 NOTE — THERAPY
Speech Therapy Contact Note:  Attempted to see pt for cognitive-linguistic evaluation. Pt just transferred to neurosurgery floor within last 5 minutes. SLP to follow for evaluation after transfer.

## 2019-01-01 NOTE — PROGRESS NOTES
Pt arrived on unit from ICU at approx at 1330. Pt is aaox4. MADRIGAL 5/5. Denies N/T. Up w/ CGA, ambulated from WC to bed with shuffled gait on arrival. Pt denies pain. Voiding w/o difficulty. Reviewed poc with pt-verbalized understanding. Bed alarm in use. Call light in reach. Tele monitor placed.

## 2019-01-01 NOTE — ASSESSMENT & PLAN NOTE
Per patient history of new diagnosis of thrombocytopenia this year with negative workup by his primary care physician  Monitor daily CBC  Monitor for the need for hematology consultation/bone marrow biopsy  Medications seem unlikely culprit here, consider pharmacy consultation to review this formally

## 2019-01-01 NOTE — CONSULTS
Critical Care Consultation    Date of consult: 1/1/2019    Referring Physician  No att. providers found    Reason for Consultation  Critical care management of intracranial hemorrhage    History of Presenting Illness  83 y.o. male who presented 12/31/2018 with new onset severe headache and difficulty finding words.  Patient seen midday in the emergency room and hospitalist consulted.  Patient did not arrived to the ICU after midnight and I was not called until several hours later.  Patient no prior history of hypertension or CVA.  Patient no history of trauma.  Patient takes fish oil but has had no bleeding issues except for his been told he has some thrombocytopenia and that is new this year.  The patient this a.m. while at work developed severe headache lasted for about 30 minutes and presented to the emergency room for evaluation.  Code stroke was called and CT revealed a ventricular left temporal bleed with a small estimated volume of less than 5 cc.  Neuro and neurosurgery were consulted he was admitted to the ICU.  Blood pressure was treated with hydralazine x1 initially and subsequent blood pressure been acceptable below the target level of 160 systolic.  Headache recurred when he arrived to the ER but his subsequent resolved again.  He has no expressive a aphasia at this time, it has resolved.  He does not have any other focal neurological symptoms.    Code Status  Full Code    Review of Systems  Review of Systems   Constitutional: Negative for fatigue and fever.   HENT: Negative for congestion and sore throat.    Eyes: Negative for visual disturbance.   Respiratory: Negative for cough and shortness of breath.    Cardiovascular: Negative for chest pain, palpitations and leg swelling.   Gastrointestinal: Negative for abdominal pain, nausea and vomiting.   Endocrine: Negative.    Genitourinary: Negative for dysuria, flank pain and hematuria.   Musculoskeletal: Negative for back pain and gait problem.    Neurological: Positive for speech difficulty and headaches. Negative for dizziness, tremors, seizures, syncope, facial asymmetry, weakness, light-headedness and numbness.   Hematological: Does not bruise/bleed easily.   Psychiatric/Behavioral: Negative for agitation, behavioral problems and confusion. The patient is not nervous/anxious.        Past Medical History  GERD, thrombocytopenia, dyslipidemia  No history hypertension, CAD, MI, diabetes    Surgical History   has a past surgical history that includes cholecystectomy and appendectomy.    Family History  Mother with history of coronary bypass grafting surgery    Social History   reports that he has never smoked. He does not have any smokeless tobacco history on file. He reports that he does not drink alcohol or use drugs.    Medications  Home Medications     Reviewed by Alhaji Ferreira (Pharmacy Tech) on 12/31/18 at 2045  Med List Status: Partial   Medication Last Dose Status   atorvastatin (LIPITOR) 10 MG Tab 12/31/2018 Active   Flaxseed, Linseed, (FLAXSEED OIL PO) 12/31/2018 Active   LATANOPROST OP  Active   multivitamin (THERAGRAN) Tab 12/31/2018 Active   Omega-3 Fatty Acids (OMEGA-3 FISH OIL PO) 12/31/2018 Active   omeprazole (PRILOSEC) 20 MG CPDR 12/31/2018 Active              Current Facility-Administered Medications   Medication Dose Route Frequency Provider Last Rate Last Dose   • atorvastatin (LIPITOR) tablet 10 mg  10 mg Oral DAILY Jn Rivas M.D.       • omeprazole (PRILOSEC) capsule 20 mg  20 mg Oral DAILY Jn Rivas M.D.       • Respiratory Care per Protocol   Nebulization Continuous RT Jn Rivas M.D.       • acetaminophen (TYLENOL) tablet 650 mg  650 mg Oral Q4HRS PRN Jn Rivas M.D.        Or   • acetaminophen (TYLENOL) suppository 650 mg  650 mg Rectal Q4HRS PRN Jn Rivas M.D.       • MD Alert...ICU Electrolyte Replacement per Pharmacy   Other pharmacy to dose Jn Rivas M.D.        • ondansetron (ZOFRAN ODT) dispertab 4 mg  4 mg Oral Q4HRS PRN Jn Rivas M.D.        Or   • ondansetron (ZOFRAN) syringe/vial injection 4 mg  4 mg Intravenous Q4HRS PRN Jn Rivas M.D.       • senna-docusate (PERICOLACE or SENOKOT S) 8.6-50 MG per tablet 2 Tab  2 Tab Oral BID Jn Rivas M.D.        And   • polyethylene glycol/lytes (MIRALAX) PACKET 1 Packet  1 Packet Oral QDAY PRN Jn Rivas M.D.        And   • magnesium hydroxide (MILK OF MAGNESIA) suspension 30 mL  30 mL Oral QDAY PRN Jn Rivas M.D.        And   • bisacodyl (DULCOLAX) suppository 10 mg  10 mg Rectal QDAY PRN Jn Rivas M.D.       • LORazepam (ATIVAN) injection 2 mg  2 mg Intravenous Q5 MIN PRN Jn Rivas M.D.       • NS infusion   Intravenous Continuous Jn Rivas M.D. 75 mL/hr at 01/01/19 0035     • hydrALAZINE (APRESOLINE) injection 10 mg  10 mg Intravenous Q4HRS PRN Jn Rivas M.D.   10 mg at 01/01/19 0039   • enalaprilat (VASOTEC) injection 1.25 mg  1.25 mg Intravenous Q6HRS PRN Jn Rivas M.D.       • niCARdipine (CARDENE) 25 mg in  mL Infusion  0-15 mg/hr Intravenous Continuous Jn Rivas M.D.   Stopped at 12/31/18 1715   • levETIRAcetam (KEPPRA) 500 mg in  mL IVPB  500 mg Intravenous Q12HRS Jn Rivas M.D.   Stopped at 12/31/18 1930       Allergies  No Known Allergies    Vital Signs last 24 hours  Temp:  [36.6 °C (97.8 °F)-36.8 °C (98.3 °F)] 36.8 °C (98.3 °F)  Pulse:  [56-72] 64  Resp:  [13-55] 14  BP: (128-156)/(84-99) 128/99    Physical Exam  Physical Exam   Constitutional: He is oriented to person, place, and time. He appears well-nourished. He is cooperative.  Non-toxic appearance. No distress.   HENT:   Head: Normocephalic and atraumatic.   Mouth/Throat: Oropharynx is clear and moist. Mucous membranes are not dry and not cyanotic.   Eyes: Pupils are equal, round, and reactive to light. EOM are normal.  No scleral icterus.   Neck: Phonation normal. Neck supple. Normal carotid pulses and no JVD present.   Cardiovascular: Normal rate, regular rhythm and intact distal pulses.  Exam reveals no gallop and no friction rub.    No murmur heard.  Pulmonary/Chest: Effort normal. No respiratory distress. He has no wheezes. He has no rhonchi. He has no rales.   Abdominal: Soft. Bowel sounds are normal. He exhibits no distension. There is no hepatosplenomegaly. There is no guarding and no CVA tenderness.   Musculoskeletal: He exhibits no edema or tenderness.   Neurological: He is alert and oriented to person, place, and time. He displays no atrophy and no tremor. No cranial nerve deficit. He exhibits normal muscle tone. He displays no seizure activity. Coordination and gait normal. GCS eye subscore is 4. GCS verbal subscore is 5. GCS motor subscore is 6.   Skin: Skin is warm and dry. No rash noted. He is not diaphoretic. No cyanosis. Nails show no clubbing.   Psychiatric: He has a normal mood and affect. His speech is normal and behavior is normal. Judgment and thought content normal. Cognition and memory are normal.       Fluids    Intake/Output Summary (Last 24 hours) at 01/01/19 0509  Last data filed at 01/01/19 0400   Gross per 24 hour   Intake            752.5 ml   Output              450 ml   Net            302.5 ml       Laboratory  Recent Results (from the past 48 hour(s))   CBC WITH DIFFERENTIAL    Collection Time: 12/31/18  1:04 PM   Result Value Ref Range    WBC 5.8 4.8 - 10.8 K/uL    RBC 5.66 4.70 - 6.10 M/uL    Hemoglobin 17.2 14.0 - 18.0 g/dL    Hematocrit 50.3 42.0 - 52.0 %    MCV 88.9 81.4 - 97.8 fL    MCH 30.4 27.0 - 33.0 pg    MCHC 34.2 33.7 - 35.3 g/dL    RDW 40.6 35.9 - 50.0 fL    Platelet Count 95 (L) 164 - 446 K/uL    MPV 10.3 9.0 - 12.9 fL    Neutrophils-Polys 41.70 (L) 44.00 - 72.00 %    Lymphocytes 28.90 22.00 - 41.00 %    Monocytes 28.00 (H) 0.00 - 13.40 %    Eosinophils 0.20 0.00 - 6.90 %    Basophils  0.30 0.00 - 1.80 %    Immature Granulocytes 0.90 0.00 - 0.90 %    Nucleated RBC 0.00 /100 WBC    Neutrophils (Absolute) 2.43 1.82 - 7.42 K/uL    Lymphs (Absolute) 1.68 1.00 - 4.80 K/uL    Monos (Absolute) 1.63 (H) 0.00 - 0.85 K/uL    Eos (Absolute) 0.01 0.00 - 0.51 K/uL    Baso (Absolute) 0.02 0.00 - 0.12 K/uL    Immature Granulocytes (abs) 0.05 0.00 - 0.11 K/uL    NRBC (Absolute) 0.00 K/uL   COMP METABOLIC PANEL    Collection Time: 12/31/18  1:04 PM   Result Value Ref Range    Sodium 140 135 - 145 mmol/L    Potassium 4.2 3.6 - 5.5 mmol/L    Chloride 104 96 - 112 mmol/L    Co2 29 20 - 33 mmol/L    Anion Gap 7.0 0.0 - 11.9    Glucose 105 (H) 65 - 99 mg/dL    Bun 17 8 - 22 mg/dL    Creatinine 0.94 0.50 - 1.40 mg/dL    Calcium 9.6 8.5 - 10.5 mg/dL    AST(SGOT) 12 12 - 45 U/L    ALT(SGPT) 22 2 - 50 U/L    Alkaline Phosphatase 56 30 - 99 U/L    Total Bilirubin 2.7 (H) 0.1 - 1.5 mg/dL    Albumin 4.7 3.2 - 4.9 g/dL    Total Protein 7.2 6.0 - 8.2 g/dL    Globulin 2.5 1.9 - 3.5 g/dL    A-G Ratio 1.9 g/dL   PROTHROMBIN TIME    Collection Time: 12/31/18  1:04 PM   Result Value Ref Range    PT 14.6 12.0 - 14.6 sec    INR 1.13 0.87 - 1.13   APTT    Collection Time: 12/31/18  1:04 PM   Result Value Ref Range    APTT 37.3 (H) 24.7 - 36.0 sec   COD (ADULT)    Collection Time: 12/31/18  1:04 PM   Result Value Ref Range    ABO Grouping Only O     Rh Grouping Only POS     Antibody Screen-Cod NEG    TROPONIN    Collection Time: 12/31/18  1:04 PM   Result Value Ref Range    Troponin I 0.01 0.00 - 0.04 ng/mL   ESTIMATED GFR    Collection Time: 12/31/18  1:04 PM   Result Value Ref Range    GFR If African American >60 >60 mL/min/1.73 m 2    GFR If Non African American >60 >60 mL/min/1.73 m 2   Lipid Profile - Fasting    Collection Time: 12/31/18  1:04 PM   Result Value Ref Range    Cholesterol,Tot 114 100 - 199 mg/dL    Triglycerides 111 0 - 149 mg/dL    HDL 37 (A) >=40 mg/dL    LDL 55 <100 mg/dL   ACCU-CHEK GLUCOSE    Collection Time:  18  1:07 PM   Result Value Ref Range    Glucose - Accu-Ck 109 (H) 65 - 99 mg/dL   EKG (NOW)    Collection Time: 18  1:48 PM   Result Value Ref Range    Report       [facility] Emergency Dept.    Test Date:  2018  Pt Name:    OTTO MIDDLETON               Department: ER  MRN:        [id number]                      Room:       [facility]  Gender:     Male                         Technician: [id number]  :        [id number]                   Requested By:ER TRIAGE PROTOCOL  Order #:    [id number]                    Reading MD: CHRISTINA NOLAND MD    Measurements  Intervals                                Axis  Rate:       57                           P:          55  SD:         136                          QRS:        -56  QRSD:       100                          T:          -19  QT:         428  QTc:        417    Interpretive Statements  SINUS BRADYCARDIA  LEFT ANTERIOR FASCICULAR BLOCK  ABNRM R PROG, CONSIDER ASMI OR LEAD PLACEMENT  T wave inversions in III and aVF may indicate mild ischemia  No previous ECG available for comparison    Electronically Signed On 2018 15:01:28 PST by CHRISTINA NOLAND MD     URINALYSIS CULTURE, IF INDICATED    Collection Time: 18  1:50 PM   Result Value Ref Range    Color Yellow     Character Clear     Specific Gravity 1.033 <1.035    Ph 5.5 5.0 - 8.0    Glucose Negative Negative mg/dL    Ketones Negative Negative mg/dL    Protein Negative Negative mg/dL    Bilirubin Negative Negative    Urobilinogen, Urine 0.2 Negative    Nitrite Negative Negative    Leukocyte Esterase Negative Negative    Occult Blood Negative Negative    Micro Urine Req see below    ABO AND RH CONFIRMATION    Collection Time: 18  7:40 PM   Result Value Ref Range    ABO Confirm O     Second Rh Group POS    Sodium Serum (NA)    Collection Time: 18  7:40 PM   Result Value Ref Range    Sodium 141 135 - 145 mmol/L   Platelet Mapping with Basic TEG    Collection Time: 18   7:40 PM   Result Value Ref Range    Reaction Time Initial-R 6.8 5.0 - 10.0 min    Clot Kinetics-K 3.5 (H) 1.0 - 3.0 min    Clot Angle-Angle 49.8 (L) 53.0 - 72.0 degrees    Maximum Clot Strength-MA 53.1 50.0 - 70.0 mm    Lysis 30 minutes-LY30 0.0 0.0 - 8.0 %    % Inhibition ADP 29.5 %    % Inhibition AA 0.2 %    TEG Algorithm Link Algorithm    CBC with Differential    Collection Time: 01/01/19  4:00 AM   Result Value Ref Range    WBC 7.3 4.8 - 10.8 K/uL    RBC 5.54 4.70 - 6.10 M/uL    Hemoglobin 16.5 14.0 - 18.0 g/dL    Hematocrit 49.2 42.0 - 52.0 %    MCV 88.8 81.4 - 97.8 fL    MCH 29.8 27.0 - 33.0 pg    MCHC 33.5 (L) 33.7 - 35.3 g/dL    RDW 40.7 35.9 - 50.0 fL    Platelet Count 95 (L) 164 - 446 K/uL    MPV 11.0 9.0 - 12.9 fL    Neutrophils-Polys 41.00 (L) 44.00 - 72.00 %    Lymphocytes 30.90 22.00 - 41.00 %    Monocytes 27.10 (H) 0.00 - 13.40 %    Eosinophils 0.30 0.00 - 6.90 %    Basophils 0.30 0.00 - 1.80 %    Immature Granulocytes 0.40 0.00 - 0.90 %    Nucleated RBC 0.00 /100 WBC    Neutrophils (Absolute) 2.98 1.82 - 7.42 K/uL    Lymphs (Absolute) 2.24 1.00 - 4.80 K/uL    Monos (Absolute) 1.97 (H) 0.00 - 0.85 K/uL    Eos (Absolute) 0.02 0.00 - 0.51 K/uL    Baso (Absolute) 0.02 0.00 - 0.12 K/uL    Immature Granulocytes (abs) 0.03 0.00 - 0.11 K/uL    NRBC (Absolute) 0.00 K/uL   Basic Metabolic Panel (BMP)    Collection Time: 01/01/19  4:00 AM   Result Value Ref Range    Sodium 141 135 - 145 mmol/L    Potassium 4.0 3.6 - 5.5 mmol/L    Chloride 107 96 - 112 mmol/L    Co2 25 20 - 33 mmol/L    Glucose 94 65 - 99 mg/dL    Bun 14 8 - 22 mg/dL    Creatinine 0.91 0.50 - 1.40 mg/dL    Calcium 9.5 8.5 - 10.5 mg/dL    Anion Gap 9.0 0.0 - 11.9   HEPATIC FUNCTION PANEL    Collection Time: 01/01/19  4:00 AM   Result Value Ref Range    Alkaline Phosphatase 50 30 - 99 U/L    AST(SGOT) 13 12 - 45 U/L    ALT(SGPT) 18 2 - 50 U/L    Total Bilirubin 2.8 (H) 0.1 - 1.5 mg/dL    Direct Bilirubin 0.3 0.1 - 0.5 mg/dL    Indirect Bilirubin  2.5 (H) 0.0 - 1.0 mg/dL    Albumin 4.2 3.2 - 4.9 g/dL    Total Protein 6.4 6.0 - 8.2 g/dL   ESTIMATED GFR    Collection Time: 01/01/19  4:00 AM   Result Value Ref Range    GFR If African American >60 >60 mL/min/1.73 m 2    GFR If Non African American >60 >60 mL/min/1.73 m 2       Imaging  MR-BRAIN-WITH & W/O   Final Result      1.  Grossly stable size of LEFT temporal parenchymal hematoma. No associated enhancement to suggest underlying mass or vascular malformation   2.  New intraventricular extension of blood without hydrocephalus   3.  Foci of remote microhemorrhage in the RIGHT temporal lobe and RIGHT cerebellar hemisphere, possibly related to hypertension or amyloid angiopathy   4.  Mild-to-moderate atrophy   5.  Mild white matter changes      DX-CHEST-PORTABLE (1 VIEW)   Final Result         1. No acute cardiopulmonary abnormalities are identified.      CT-HEAD W/O   Final Result         1. There is hemorrhage within the temporal horn of the left lateral ventricle. No dilatation of the lateral ventricles.      CRITICAL RESULT READ BACK: Preliminary findings discussed with and critical read back performed by Dr. CHRISTINA NOLAND in the Emergency Department via telephone on 12/31/2018 1:35 PM         CT-CTA NECK WITH & W/O-POST PROCESSING   Final Result      CT angiogram of the neck within normal limits.   Hemorrhage within the left lateral ventricle.      CT-CTA HEAD WITH & W/O-POST PROCESS   Final Result         1. No aneurysm or hemodynamically significant narrowing of the major intracranial vessels.      EC-ECHOCARDIOGRAM COMPLETE W/O CONT    (Results Pending)       Assessment/Plan  * Intracranial hemorrhage (HCC)   Assessment & Plan    New intraventricular hemorrhage in the left ventricular system, unusual location?  CTA negative for obvious aneurysm  No obvious coagulopathic process  No history of trauma  No history of hypertension although blood pressure elevated arrival and improved with hydralazine  intravenously  Q. one hour neurochecks  Neurosurgery/neurology consultation noted  Echocardiogram, MRI and EEG pending   May need therapy eval's in a.m.  Continue statin         Secondary hypertension   Assessment & Plan    No history of essential hypertension  Blood pressure elevated today associated with his intraventricular hemorrhage  IV hydralazine and/or Vasotec every 6 hours as needed to maintain systolic blood pressure less than 160  Initiate Cardene infusion if needed to control blood pressure with same parameters  If continuous Cardene infusion initiated consider arterial line placement  Bedrest her blood pressure is controlled     Thrombocytopenia (HCC)   Assessment & Plan    Per patient history of new diagnosis of thrombocytopenia this year with negative workup by his primary care physician  Monitor daily CBC  Monitor for the need for hematology consultation/bone marrow biopsy  Medications seem unlikely culprit here, consider pharmacy consultation to review this formally     Hyperbilirubinemia   Assessment & Plan    Prior workup by primary care physician  Avoid hepatotoxins  Monitor     GERD (gastroesophageal reflux disease)   Assessment & Plan    Continue home PPI  Antacids as needed  Antireflux measures     Dyslipidemia   Assessment & Plan    Resume home statin therapy  Diet         Discussed patient condition and risk of morbidity and/or mortality with RN, Charge nurse / hot rounds and Patient.

## 2019-01-01 NOTE — PROCEDURES
EEG 12/31/18 5:01 PM    VIDEO ELECTROENCEPHALOGRAM / EPILEPSY MONITORING UNIT REPORT      Referring provider: Dr. BTAES    DOS:   12/31/2018      INDICATION:  Elias Vincent 83 y.o. male presenting SEVERE HA AND HAD DIFFICULTY REMEMBERING, CT L TEMP VENTRICULAR BLEED, RO POSS SZ. PT STATED HA IS NOW GONE AND HE IS BACK TO BASELINE.  NO PERTINENT MEDS    CURRENT ANTIEPILEPTIC REGIMEN: NONE     DURATION: 26 minutes      TECHNIQUE: A 30-channel video electroencephalogram (VEEG) was performed in accordance with the international 10-20 system. This digital study was reviewed in bipolar and referential montages. The recording examined the patient during wakeful, drowsy and sleep states.         DESCRIPTION OF THE RECORD:  During the awake state, background shows symmetrical 10-11 Hz alpha activity posteriorly with amplitude of 70 mV.  There was reactivity with eye opening/closure.  Normal anterior-posterior gradient was noted with faster beta frequencies seen anteriorly.  During drowsiness, high-amplitude delta frequencies were seen. There are monomorphic delta bursts over frontal - R>L more when the patient was drowsy    During the sleep state, background shows diffuse high-amplitude 4-5 Hz delta activity.  Symmetrical high-amplitude sleep spindles and vertex sharp activities were seen in the central leads.    ACTIVATION PROCEDURES:   Hyperventilation was not performed by the patient.   Intermittent Photic stimulation was performed in a stepwise fashion from 1 to 30 Hz and elicited a normal response (photic driving), most noticeable in the posterior leads.      ICTAL AND/OR INTERICTAL FINDINGS:   No focal or generalized epileptiform activity was noted. There are monomorphic delta bursts over frontal - R>L more when the patient was drowsy. No seizures were recorded during the study.     EVENT(S):  NONE    EKG: sampling review of EKG recording demonstrated regular rhythm      INTERPRETATION:          ________________________________________________________________________    This is mildly abnormal routine video EEG recording in the awake and drowsy/sleep state(s).    This scalp EEG denotes      1. Mild focal cortical dysfunction over frontal --this patten might increase the risk of frontal seizure - advise clinical correlation regarding management       There are no epileptiform discharges in this record    Of note, unremarkable EEG does not completely exclude the diagnosis  of seizures since seizure is an episodic phenomena.  Clinical correlation may help     If clinical suspicion of seizure remains high.  Prolonged EEG   monitoring may be of help.    EEG 12/31/18 5:11 PM    ________________________________________________________________________  ________________________________________________________________________      REFERENCE    EEG is described as 'abnormal' (that is 'not normal' brain activity) does not 'prove' that the person has epilepsy and does not mean the patient needs treatment. ... Also, many people who do have epilepsy will only have 'abnormal' activity on the EEG if they have a seizure at the time the test is happening.     ________________________________________________________________________

## 2019-01-01 NOTE — PROGRESS NOTES
Hospital Medicine Daily Progress Note    Date of Service  1/1/2019    Chief Complaint  83 y.o. male admitted 12/31/2018 with headache.    Hospital Course    Mr. Vincent has a past medical history of dyslipidemia that developed a sudden onset of headache about 11:00 on 12/31/2018.  This was associated with weakness and confusion.  He was brought to the emergency room where a CAT scan of the head revealed a an intracranial hemorrhage.  He was admitted to the intensive care unit and Dr. Hood, neurosurgery has been consulted.      Interval Problem Update  1/1/19: EEG was negative. He had one dose of IV hydralazine last night due to elevated BP. Mr. Vincent has a significant expressive aphasia and is frustrated with his condition. I discussed with his nurse.     Consultants/Specialty  Dr. Hood, neurosurgery  Critical Care. I discussed with Dr. Branch on ICU Hot Rounds.    Neurology   Code Status  full    Disposition  neuro    Review of Systems  Review of Systems   Unable to perform ROS: Medical condition        Physical Exam  Temp:  [36.6 °C (97.8 °F)-36.8 °C (98.3 °F)] 36.8 °C (98.3 °F)  Pulse:  [56-72] 61  Resp:  [13-55] 17  BP: (128-156)/(84-99) 128/99    Physical Exam   Constitutional: No distress.   HENT:   Head: Atraumatic.   Neck: Neck supple.   Cardiovascular: Normal rate and regular rhythm.    No murmur heard.  Pulmonary/Chest: Effort normal. No respiratory distress. He has no wheezes.   Abdominal: Soft. He exhibits no distension. There is no tenderness.   Musculoskeletal: He exhibits no edema.   Neurological:   He moves his extremities equally  Speech with significant expressive aphasia.    Skin: Skin is warm and dry. He is not diaphoretic.   Nursing note and vitals reviewed.      Fluids    Intake/Output Summary (Last 24 hours) at 01/01/19 0656  Last data filed at 01/01/19 0600   Gross per 24 hour   Intake           1002.5 ml   Output              550 ml   Net            452.5 ml       Laboratory  Recent Labs       12/31/18   1304  01/01/19   0400   WBC  5.8  7.3   RBC  5.66  5.54   HEMOGLOBIN  17.2  16.5   HEMATOCRIT  50.3  49.2   MCV  88.9  88.8   MCH  30.4  29.8   MCHC  34.2  33.5*   RDW  40.6  40.7   PLATELETCT  95*  95*   MPV  10.3  11.0     Recent Labs      12/31/18   1304  12/31/18   1940  01/01/19   0400   SODIUM  140  141  141   POTASSIUM  4.2   --   4.0   CHLORIDE  104   --   107   CO2  29   --   25   GLUCOSE  105*   --   94   BUN  17   --   14   CREATININE  0.94   --   0.91   CALCIUM  9.6   --   9.5     Recent Labs      12/31/18   1304   APTT  37.3*   INR  1.13         Recent Labs      12/31/18   1304   TRIGLYCERIDE  111   HDL  37*   LDL  55       Imaging  MR-BRAIN-WITH & W/O   Final Result      1.  Grossly stable size of LEFT temporal parenchymal hematoma. No associated enhancement to suggest underlying mass or vascular malformation   2.  New intraventricular extension of blood without hydrocephalus   3.  Foci of remote microhemorrhage in the RIGHT temporal lobe and RIGHT cerebellar hemisphere, possibly related to hypertension or amyloid angiopathy   4.  Mild-to-moderate atrophy   5.  Mild white matter changes      DX-CHEST-PORTABLE (1 VIEW)   Final Result         1. No acute cardiopulmonary abnormalities are identified.      CT-HEAD W/O   Final Result         1. There is hemorrhage within the temporal horn of the left lateral ventricle. No dilatation of the lateral ventricles.      CRITICAL RESULT READ BACK: Preliminary findings discussed with and critical read back performed by Dr. CHRISTINA NOLAND in the Emergency Department via telephone on 12/31/2018 1:35 PM         CT-CTA NECK WITH & W/O-POST PROCESSING   Final Result      CT angiogram of the neck within normal limits.   Hemorrhage within the left lateral ventricle.      CT-CTA HEAD WITH & W/O-POST PROCESS   Final Result         1. No aneurysm or hemodynamically significant narrowing of the major intracranial vessels.           Assessment/Plan  * Intracranial  hemorrhage (HCC)   Assessment & Plan    Non-traumatic ICH requiring ICU admission.  Consider amyloid angiopathy  CTA was negative for aneurysm  We will monitor his blood pressure and treat to keep SBP less than 160    Discussed with neurology Dr Hummel concerned that his memory loss related to seizure secondary to his intracranial hemorrhage we will empirically start him on Keppra follow-up on EEG  MRI brain negative  BMP in the morning ordered to follow sodium levels.   Dr. Hood, neurosurgery, consulted.  He has an expressive aphasia.  PT/OT/ST and physiatry consultation.      Secondary hypertension   Assessment & Plan    History of  He was not on any medications as an outpatient.   IV PRN BP meds ordered and consider starting an antihypertensive medication.      Thrombocytopenia (HCC)   Assessment & Plan    Platelet count has been 95  No previous reference range since normal platelets in 2013  CBC ordered for the morning to follow platelet count     Hyperbilirubinemia   Assessment & Plan    abd exam benign  Otherwise normal LFTs  Consider [place]     GERD (gastroesophageal reflux disease)   Assessment & Plan    Continue Prilosec     Dyslipidemia   Assessment & Plan    Continue atorvastatin          VTE prophylaxis: SCDs

## 2019-01-01 NOTE — THERAPY
"Occupational Therapy Evaluation completed.   Functional Status:  Pt presents to skilled OT services following Lt temporal ICH. Pt was able to perform basic self care tasks, functional mobility and transfers with supervision/sba w/o AE/AD. Pt BUE strength, ROM and sensation intact and within functional range. Pt educated on importance of graded activity and rest and recovery to allow optimal healing and encouraged/ and deferred pt to discuss with neurologist regarding return to work expectations. Pt appears to be back to baseline functionally and does not present the need for further acute OT needs at this time.   Plan of Care: Patient with no further skilled OT needs in the acute care setting at this time  Discharge Recommendations:  Equipment: No Equipment Needed. Post-acute therapy Anticipate that the patient will have no further occupational therapy needs after discharge from the hospital.       See \"Rehab Therapy-Acute\" Patient Summary Report for complete documentation.    "

## 2019-01-01 NOTE — PROGRESS NOTES
Neurosurgery Progress Note    Subjective:  No changes overnight.  No C/O headache.   Has been seen by neurology.   Plan for floor today with discharge home soon.    Exam:  A&O  PERRLA  EOMI  No drift  Motor 5/5    BP  Min: 128/99  Max: 156/84  Pulse  Av.3  Min: 56  Max: 72  Resp  Av.4  Min: 13  Max: 55  Temp  Av.8 °C (98.2 °F)  Min: 36.6 °C (97.8 °F)  Max: 36.9 °C (98.4 °F)  SpO2  Av %  Min: 90 %  Max: 96 %    No Data Recorded    Recent Labs      18   1304  19   0400   WBC  5.8  7.3   RBC  5.66  5.54   HEMOGLOBIN  17.2  16.5   HEMATOCRIT  50.3  49.2   MCV  88.9  88.8   MCH  30.4  29.8   MCHC  34.2  33.5*   RDW  40.6  40.7   PLATELETCT  95*  95*   MPV  10.3  11.0     Recent Labs      18   1304  18   1940  19   0400   SODIUM  140  141  141   POTASSIUM  4.2   --   4.0   CHLORIDE  104   --   107   CO2  29   --   25   GLUCOSE  105*   --   94   BUN  17   --   14   CREATININE  0.94   --   0.91   CALCIUM  9.6   --   9.5     Recent Labs      18   1304   APTT  37.3*   INR  1.13     Recent Labs      18   1940   REACTMIN  6.8   CLOTKINET  3.5*   CLOTANGL  49.8*   MAXCLOTS  53.1   PDG41VRY  0.0   PRCINADP  29.5   PRCINAA  0.2       Intake/Output       18 0700 - 19 0659 19 07 - 19 0659      [id number] Total [id number] Total       Intake    P.O.  --  -- --  240  -- 240    P.O. -- -- -- 240 -- 240    I.V.  --  802.5 802.5  150  -- 150    Cardene Volume -- -- -- 0 -- 0    Volume (mL) (NS infusion) -- 802.5 802.5 150 -- 150    IV Piggyback  --  200 200  0  -- 0    Volume (mL) (levETIRAcetam (KEPPRA) 500 mg in  mL IVPB) -- 200 200 0 -- 0    Total Intake -- 1002.5 1002.5 390 -- 390       Output    Urine  --  550 550  200  -- 200    Urine Void (mL) -- 550 550 200 -- 200    Stool  --  -- --  --  -- --    Number of Times Stooled -- -- -- 0 x -- 0 x    Total Output -- 550 550 200 -- 200       Net I/O     -- 452.5 452.5 190 -- 190             Intake/Output Summary (Last 24 hours) at 01/01/19 0853  Last data filed at 01/01/19 0800   Gross per 24 hour   Intake           1392.5 ml   Output              750 ml   Net            642.5 ml            • levETIRAcetam  500 mg BID   • atorvastatin  10 mg DAILY   • omeprazole  20 mg DAILY   • Respiratory Care per Protocol   Continuous RT   • acetaminophen  650 mg Q4HRS PRN    Or   • acetaminophen  650 mg Q4HRS PRN   • MD Alert...Adult ICU Electrolyte Replacement per Pharmacy   pharmacy to dose   • ondansetron  4 mg Q4HRS PRN    Or   • ondansetron  4 mg Q4HRS PRN   • senna-docusate  2 Tab BID    And   • polyethylene glycol/lytes  1 Packet QDAY PRN    And   • magnesium hydroxide  30 mL QDAY PRN    And   • bisacodyl  10 mg QDAY PRN   • LORazepam  2 mg Q5 MIN PRN   • NS   Continuous   • hydrALAZINE  10 mg Q4HRS PRN   • enalaprilat  1.25 mg Q6HRS PRN   • niCARdipine infusion  0-15 mg/hr Continuous       Assessment and Plan:  Hospital day #2  Small left temporal IVH  No neurosurgical interventions planned.  OK for floor  OK for home.  No further scans needed.  Contact neurosurgery prn

## 2019-01-01 NOTE — PROGRESS NOTES
Patient Diagnosis and Management daily plan per neurology:        1) Left temporal ICH with IVH: Etiology unclear.  MRI brain personally reviewed : in addition to the left mesial temporal bleed and IVH it  demonstrated microbleeds #5 , some cortical and some subcortical not enough number or localization to diagnose Cerebral amyloid.  Vasculitis can produce similar picture, but patient has been otherwise asymptomatic.  And no evidence for aneurysm per CTA.  Would recommend :   TSH, JOSE G, SED,  Neurology follow in 8 weeks, and Keppra 500 mg BID for now, as most likely his confusional event was a seizure yesterday.  Neurology signs off        Complete neurological note to support plan is below.    Chief Complain:F/U for: Follow ICH     SUBJECTIVE:  Minimal headache.  Oriented, no complains       ROS:Unchanged      PHYSICAL EXAMINATION:    Constitutional: lying in bed, resting comfortably    CARDIOVASCULAR:S1,S2    PULMONARY:CTA      EXTREMITIES:No edema, no stigmata    NEUROLOGICAL:    MENTAL:awake, alert, oriented #5  Normal speech, and normal recall at 5 and 10 min    CRANIAL NERVES:2-12 normal.    MOTOR:5/5    SENSORY:intact to all     DTR:+1    BABINSKI:neg    Movements: No abnormal noticed.    CEREBELLAR:  No   dysmetria     GAIT: deferred.                      Vitals:    01/01/19 0600 01/01/19 0700 01/01/19 0800 01/01/19 0900   BP:       Pulse: 60 72 62 67   Resp: (!) 23 15 18 20   Temp:  36.9 °C (98.4 °F)     TempSrc:  Temporal     SpO2: 92% 92% 93% 94%   Weight:                      Imaging: neuroimaging reviewed and directly visualized by me  MR-BRAIN-WITH & W/O   Final Result      1.  Grossly stable size of LEFT temporal parenchymal hematoma. No associated enhancement to suggest underlying mass or vascular malformation   2.  New intraventricular extension of blood without hydrocephalus   3.  Foci of remote microhemorrhage in the RIGHT temporal lobe and RIGHT cerebellar hemisphere, possibly related to  hypertension or amyloid angiopathy   4.  Mild-to-moderate atrophy   5.  Mild white matter changes      DX-CHEST-PORTABLE (1 VIEW)   Final Result         1. No acute cardiopulmonary abnormalities are identified.      CT-HEAD W/O   Final Result         1. There is hemorrhage within the temporal horn of the left lateral ventricle. No dilatation of the lateral ventricles.      CRITICAL RESULT READ BACK: Preliminary findings discussed with and critical read back performed by Dr. CHRISTINA NOLAND in the Emergency Department via telephone on 12/31/2018 1:35 PM         CT-CTA NECK WITH & W/O-POST PROCESSING   Final Result      CT angiogram of the neck within normal limits.   Hemorrhage within the left lateral ventricle.      CT-CTA HEAD WITH & W/O-POST PROCESS   Final Result         1. No aneurysm or hemodynamically significant narrowing of the major intracranial vessels.            Labs:  Recent Labs      12/31/18   1304  01/01/19   0400   WBC  5.8  7.3   RBC  5.66  5.54   HEMOGLOBIN  17.2  16.5   HEMATOCRIT  50.3  49.2   MCV  88.9  88.8   MCH  30.4  29.8   MCHC  34.2  33.5*   RDW  40.6  40.7   PLATELETCT  95*  95*   MPV  10.3  11.0     Recent Labs      12/31/18   1304  12/31/18   1940  01/01/19   0400   SODIUM  140  141  141   POTASSIUM  4.2   --   4.0   CHLORIDE  104   --   107   CO2  29   --   25   GLUCOSE  105*   --   94   BUN  17   --   14   CREATININE  0.94   --   0.91   CALCIUM  9.6   --   9.5     Recent Labs      12/31/18   1304   APTT  37.3*   INR  1.13         Recent Labs      12/31/18   1304   TROPONINI  0.01     Recent Labs      12/31/18   1304   TRIGLYCERIDE  111   HDL  37*   LDL  55     Recent Labs      12/31/18   1304  12/31/18   1940  01/01/19   0400   SODIUM  140  141  141   POTASSIUM  4.2   --   4.0   CHLORIDE  104   --   107   CO2  29   --   25   GLUCOSE  105*   --   94   BUN  17   --   14     Recent Labs      12/31/18   1304  12/31/18   1940  01/01/19   0400   SODIUM  140  141  141   POTASSIUM  4.2   --    4.0   CHLORIDE  104   --   107   CO2  29   --   25   BUN  17   --   14   CREATININE  0.94   --   0.91   CALCIUM  9.6   --   9.5     Recent Labs      12/31/18   1304   APTT  37.3*   INR  1.13     No results found for this or any previous visit.                  Vanessa Chiu M.D.    Diplomate Neurology, Vascular Neurology and Neurophysiology

## 2019-01-01 NOTE — ASSESSMENT & PLAN NOTE
Non-traumatic   CTA was negative for aneurysm  We will monitor his blood pressure and treat to keep SBP less than 160    Dr Blanca Discussed with neurology Dr Hummel concerned that his memory loss related to seizure secondary to his intracranial hemorrhage we will empirically start him on Keppra follow-up on EEG  MRI brain showed:1.  Grossly stable size of LEFT temporal parenchymal hematoma. No associated enhancement to suggest underlying mass or vascular malformation  2.  New intraventricular extension of blood without hydrocephalus  3.  Foci of remote microhemorrhage in the RIGHT temporal lobe and RIGHT cerebellar hemisphere, possibly related to hypertension or amyloid angiopathy  4.  Mild-to-moderate atrophy  5.  Mild white matter changes  BMP in the morning ordered to follow sodium levels.   Dr. Hood, neurosurgery input is noted and no surgical intervention needed  He has an expressive aphasia.  Neurology input is noted  PT/OT/ST    physiatry consultation. Is still pending  SNF referal is placed  D/w the family extensively yesterday  Seen with the nurse at the bed side  On keppra  eeg  Showed:This is mildly abnormal routine video EEG recording in the awake and drowsy/sleep state(s).     This scalp EEG denotes                  1. Mild focal cortical dysfunction over frontal --this patten might increase the risk of frontal seizure - advise clinical correlation regarding management         There are no epileptiform discharges in this record

## 2019-01-01 NOTE — ASSESSMENT & PLAN NOTE
New intraventricular hemorrhage in the left ventricular system, unusual location?  CTA negative for obvious aneurysm  No obvious coagulopathic process  No history of trauma  No history of hypertension although blood pressure elevated arrival and improved with hydralazine intravenously  Q. one hour neurochecks  Neurosurgery/neurology consultation noted  Echocardiogram, MRI and EEG pending   May need therapy eval's in a.m.  Continue statin

## 2019-01-01 NOTE — ASSESSMENT & PLAN NOTE
No history of essential hypertension  Blood pressure elevated today associated with his intraventricular hemorrhage  IV hydralazine and/or Vasotec every 6 hours as needed to maintain systolic blood pressure less than 160  Initiate Cardene infusion if needed to control blood pressure with same parameters  If continuous Cardene infusion initiated consider arterial line placement  Bedrest her blood pressure is controlled

## 2019-01-01 NOTE — PROGRESS NOTES
Dr Hummel at the bedside, new orders were received. Pt ok to transfer out of ICU or discharge home from neuro standpoint.

## 2019-01-01 NOTE — PROGRESS NOTES
Report given to receiving NIMCO Garland, plan of care was discussed. Pt removed from monitor.   Pt transported by bed with all of his belongings.

## 2019-01-01 NOTE — DISCHARGE PLANNING
Aware of PMR referral from Dr. Shadia Rivas. ICH - non-operative management.  OT recommending no further skilled need in acute setting. Would appreciate initial PT/SLP evals - as medically appropriate - to assist with determination for post acute needs. No Physiatry consult as yet per protocol. TCC following. Thank you for the referral.

## 2019-01-01 NOTE — PROGRESS NOTES
Pt to room S-121 from Red 3 on monitor with 2 ACLS. Orders implemented. VSS, GCS 15    2 RN skin check complete. No areas of concern.

## 2019-01-01 NOTE — THERAPY
"Speech Language Therapy Evaluation completed to address communication  Functional Status:  Majority of WAB completed with pt demonstrating mild deficits in speed of processing 2-3 sent level written information for oral reading and reading compreh. 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 Pt will benefit from speech tx at next level of care.   Recommendations: Provide educ to pt and his wife regarding aphasia and word retrieval strategies.   Plan of Care: Will benefit from Speech Therapy 5 times per week  Post-Acute Therapy: aphasia tx. ThanksAmado    See \"Rehab Therapy-Acute\" Patient Summary Report for complete documentation.   "

## 2019-01-01 NOTE — CONSULTS
DATE OF SERVICE:  12/31/2018    EMERGENCY ROOM CONSULTATION    CHIEF COMPLAINT:  Headache.    HISTORY OF PRESENT ILLNESS:  This is an 83-year-old right-handed man who runs   to [employer], does have some increased anxiety, work lately, refuses to retire   per his wife, who reported acute onset of a headache about 10:30 with some   word finding difficulties.  It persisted, his family was notified, he went   home and at about 4:00, although his headache has gone away, those problems   persisted, so he came to the emergency room.  CAT scan shows a left temporal   horn tip interventricular hemorrhage approximately 1.5x2 cm staying within the   ventricle.  No significant mass effect.  The CT angiogram was negative for   vascular malformation or aneurysm.  He had an EEG was performed, but no   results are available as of yet.  There was no donna seizure activity.    Neurology has been consulted.  He is going to be admitted to the hospital for   q.2-hour neuro checks.  An MRI of the brain with and without contrast shows no   obvious mass other than the recent blood hemorrhage within the ventricle.    PAST MEDICAL HISTORY:  Positive only for GERD, although he does not take his   Prilosec.  He also has glaucoma.    PAST SURGICAL HISTORY:  Negative.    SOCIAL HISTORY:  He is nonsmoker, nondrinker.  His wife and daughter were at   his bedside.    PHYSICAL EXAMINATION:  NEUROLOGIC:  He is awake.  He is alert.  He is oriented x3.  His speech is   slow, but there is some intermittent word searching problems.  He does able to   name and repeat eventually with some difficulty, but gets 3/3 objects   correctly with some time.  His pupils are symmetric.  Extraocular motion   intact.  Face is full.  Tongue is midline.  He has no pronator drift.  He   moves his arms and legs symmetrically.  He has good sensation to face, arms   and legs.  He rates his headache as 0/10.    ASSESSMENT AND PLAN:  The patient has interventricular hemorrhage  in the left   temporal tip, which is relatively safe area.  Dissolved hydrocephalus.  I do   not see any obvious bleeding, masses, no aneurysms.  So, I do not have any   explanation at this point other than vasculopathy, not otherwise specified.  I   recommend q.2-hour neuro checks overnight.  He can eat.  He can follow up   with neurology, read on the EEG.  If he looks good, he can go to the floor in   the morning.    Thank you for allowing me to participate in his care.  I think he is at low   risk for developing hydrocephalus.       ____________________________________     MD BERNICE Gonsalez III / VALERIA    DD:  12/31/2018 18:48:35  DT:  12/31/2018 20:54:46    D#:  [id number]  Job#:  [id number]

## 2019-01-01 NOTE — H&P
Hospital Medicine History & Physical Note    Date of Service  12/31/2018    Primary Care Physician  Finn Cummins D.O.    Consultants  Neurology  Neurosurgery Dr. Hood    Code Status  Full code    Chief Complaint  Headache and memory loss    History of Presenting Illness  83 y.o. 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  He denies any nausea or vomiting.  No changes in vision or speech.  He was transferred to the emergency room for further evaluation where his CT revealed intracranial hemorrhage.  At the time of my examination he reports that his headache is improved.  He is still having some difficulties with short-term memory.  He denies any falls or injuries.  He does not take any aspirin and NSAIDs or anticoagulants.    Review of Systems  Review of Systems   All other systems reviewed and are negative.      Past Medical History  Dyslipidemia  GERD    Surgical History   has a past surgical history that includes cholecystectomy and appendectomy.     Family History  Reviewed and not pertinent to the presenting problem    Social History   reports that he has never smoked. He does not have any smokeless tobacco history on file. He reports that he does not drink alcohol or use drugs.      Allergies  No Known Allergies    Medications  Prior to Admission Medications   Prescriptions Last Dose Informant Patient Reported? Taking?   Flaxseed, Linseed, (FLAXSEED OIL PO) 12/31/2018 at 0900 Patient Yes Yes   Sig: Take 1 Tab by mouth every day.   LATANOPROST OP   Yes Yes   Sig: by Ophthalmic route.   Omega-3 Fatty Acids (OMEGA-3 FISH OIL PO) 12/31/2018 at  0900 Patient Yes Yes   Sig: Take 1 Tab by mouth every day.   atorvastatin (LIPITOR) 10 MG Tab 12/31/2018 at 0900  Yes Yes   Sig: Take 10 mg by mouth every day.   multivitamin (THERAGRAN) Tab 12/31/2018 at 0900 Patient Yes Yes   Sig: Take 1 Tab by mouth every day.   omeprazole (PRILOSEC) 20 MG CPDR 12/31/2018 at 0900 Patient Yes No   Sig: Take 20 mg by mouth every day.      Facility-Administered Medications: None       Physical Exam  Temp:  [36.6 °C (97.8 °F)] 36.6 °C (97.8 °F)  Pulse:  [56-66] 63  Resp:  [13-26] 13  BP: (128-156)/(84-99) 128/99    Physical Exam   Constitutional: He is oriented to person, place, and time. He appears well-developed.   HENT:   Head: Normocephalic and atraumatic.   Mouth/Throat: Oropharynx is clear and moist. No oropharyngeal exudate.   Eyes: Pupils are equal, round, and reactive to light. Conjunctivae are normal. Right eye exhibits no discharge. Left eye exhibits no discharge. No scleral icterus.   Neck: Neck supple. No JVD present. No tracheal deviation present.   Cardiovascular: Normal rate and regular rhythm.  Exam reveals no gallop and no friction rub.    No murmur heard.  Pulmonary/Chest: Effort normal and breath sounds normal. No stridor. No respiratory distress. He has no wheezes. He exhibits no tenderness.   Abdominal: Soft. Bowel sounds are normal. He exhibits no distension. There is no tenderness. There is no rebound.   Musculoskeletal: He exhibits no edema or tenderness.   Neurological: He is alert and oriented to person, place, and time. No cranial nerve deficit. He exhibits normal muscle tone.   Skin: Skin is warm and dry. He is not diaphoretic. No cyanosis. Nails show no clubbing.   Psychiatric: He has a normal mood and affect. His behavior is normal.   Nursing note and vitals reviewed.      Laboratory:  Recent Labs      12/31/18   1304   WBC  5.8   RBC  5.66   HEMOGLOBIN  17.2   HEMATOCRIT  50.3   MCV  88.9   MCH  30.4   MCHC  34.2   RDW  40.6   PLATELETCT  95*   MPV   10.3     Recent Labs      12/31/18   1304  12/31/18   1940   SODIUM  140  141   POTASSIUM  4.2   --    CHLORIDE  104   --    CO2  29   --    GLUCOSE  105*   --    BUN  17   --    CREATININE  0.94   --    CALCIUM  9.6   --      Recent Labs      12/31/18   1304   ALTSGPT  22   ASTSGOT  12   ALKPHOSPHAT  56   TBILIRUBIN  2.7*   GLUCOSE  105*     Recent Labs      12/31/18   1304   APTT  37.3*   INR  1.13         Recent Labs      12/31/18   1304   TRIGLYCERIDE  111   HDL  37*   LDL  55     Recent Labs      12/31/18   1304   TROPONINI  0.01       Urinalysis:    Recent Labs      12/31/18   1350   SPECGRAVITY  1.033   GLUCOSEUR  Negative   KETONES  Negative   NITRITE  Negative   LEUKESTERAS  Negative        Imaging:  MR-BRAIN-WITH & W/O   Final Result      1.  Grossly stable size of LEFT temporal parenchymal hematoma. No associated enhancement to suggest underlying mass or vascular malformation   2.  New intraventricular extension of blood without hydrocephalus   3.  Foci of remote microhemorrhage in the RIGHT temporal lobe and RIGHT cerebellar hemisphere, possibly related to hypertension or amyloid angiopathy   4.  Mild-to-moderate atrophy   5.  Mild white matter changes      DX-CHEST-PORTABLE (1 VIEW)   Final Result         1. No acute cardiopulmonary abnormalities are identified.      CT-HEAD W/O   Final Result         1. There is hemorrhage within the temporal horn of the left lateral ventricle. No dilatation of the lateral ventricles.      CRITICAL RESULT READ BACK: Preliminary findings discussed with and critical read back performed by Dr. CHRISTINA NOLAND in the Emergency Department via telephone on 12/31/2018 1:35 PM         CT-CTA NECK WITH & W/O-POST PROCESSING   Final Result      CT angiogram of the neck within normal limits.   Hemorrhage within the left lateral ventricle.      CT-CTA HEAD WITH & W/O-POST PROCESS   Final Result         1. No aneurysm or hemodynamically significant narrowing of the major intracranial  vessels.      EC-ECHOCARDIOGRAM COMPLETE W/O CONT    (Results Pending)         Assessment/Plan:  I anticipate this patient will require at least two midnights for appropriate medical management, necessitating inpatient admission.    * Intracranial hemorrhage (HCC)   Assessment & Plan    Etiology is not entirely clear  CTA was negative for aneurysm  Patient will be admitted and closely monitored in the intensive care unit  We will monitor his blood pressure and treat to keep SBP less than 160  We will check TEG and correct any coagulopathy accordingly  Discussed with neurology Dr Hummel concerned that his memory loss related to seizure secondary to his intracranial hemorrhage we will empirically start him on Keppra follow-up on EEG  We will check MRI of brain  Close clinical monitoring with serial neurologic exams  Dr. Hood has been consulted will await his evaluation and further recommendations     Hyperbilirubinemia   Assessment & Plan    abd exam benign  Check direct bilirubin     GERD (gastroesophageal reflux disease)   Assessment & Plan    Continue Prilosec     Dyslipidemia   Assessment & Plan    Continue atorvastatin         VTE prophylaxis: SCD

## 2019-01-02 LAB
ANION GAP SERPL CALC-SCNC: 7 MMOL/L (ref 0–11.9)
BUN SERPL-MCNC: 19 MG/DL (ref 8–22)
CALCIUM SERPL-MCNC: 9.7 MG/DL (ref 8.5–10.5)
CHLORIDE SERPL-SCNC: 104 MMOL/L (ref 96–112)
CO2 SERPL-SCNC: 25 MMOL/L (ref 20–33)
CREAT SERPL-MCNC: 1.09 MG/DL (ref 0.5–1.4)
ERYTHROCYTE [DISTWIDTH] IN BLOOD BY AUTOMATED COUNT: 39.9 FL (ref 35.9–50)
GLUCOSE SERPL-MCNC: 117 MG/DL (ref 65–99)
HCT VFR BLD AUTO: 49 % (ref 42–52)
HGB BLD-MCNC: 16.6 G/DL (ref 14–18)
MCH RBC QN AUTO: 30.1 PG (ref 27–33)
MCHC RBC AUTO-ENTMCNC: 33.9 G/DL (ref 33.7–35.3)
MCV RBC AUTO: 88.8 FL (ref 81.4–97.8)
PLATELET # BLD AUTO: 98 K/UL (ref 164–446)
PMV BLD AUTO: 10.8 FL (ref 9–12.9)
POTASSIUM SERPL-SCNC: 4 MMOL/L (ref 3.6–5.5)
RBC # BLD AUTO: 5.52 M/UL (ref 4.7–6.1)
SODIUM SERPL-SCNC: 136 MMOL/L (ref 135–145)
WBC # BLD AUTO: 11.2 K/UL (ref 4.8–10.8)

## 2019-01-02 PROCEDURE — 700102 HCHG RX REV CODE 250 W/ 637 OVERRIDE(OP): Performed by: PSYCHIATRY & NEUROLOGY

## 2019-01-02 PROCEDURE — 36415 COLL VENOUS BLD VENIPUNCTURE: CPT

## 2019-01-02 PROCEDURE — 92507 TX SP LANG VOICE COMM INDIV: CPT

## 2019-01-02 PROCEDURE — A9270 NON-COVERED ITEM OR SERVICE: HCPCS | Performed by: HOSPITALIST

## 2019-01-02 PROCEDURE — A9270 NON-COVERED ITEM OR SERVICE: HCPCS | Performed by: PSYCHIATRY & NEUROLOGY

## 2019-01-02 PROCEDURE — 80048 BASIC METABOLIC PNL TOTAL CA: CPT

## 2019-01-02 PROCEDURE — 700102 HCHG RX REV CODE 250 W/ 637 OVERRIDE(OP): Performed by: HOSPITALIST

## 2019-01-02 PROCEDURE — 99232 SBSQ HOSP IP/OBS MODERATE 35: CPT | Performed by: FAMILY MEDICINE

## 2019-01-02 PROCEDURE — 85027 COMPLETE CBC AUTOMATED: CPT

## 2019-01-02 PROCEDURE — 770020 HCHG ROOM/CARE - TELE (206)

## 2019-01-02 RX ADMIN — LEVETIRACETAM 500 MG: 250 TABLET ORAL at 04:39

## 2019-01-02 RX ADMIN — STANDARDIZED SENNA CONCENTRATE AND DOCUSATE SODIUM 2 TABLET: 8.6; 5 TABLET, FILM COATED ORAL at 16:41

## 2019-01-02 RX ADMIN — ACETAMINOPHEN 650 MG: 325 TABLET, FILM COATED ORAL at 04:39

## 2019-01-02 RX ADMIN — OMEPRAZOLE 20 MG: 20 CAPSULE, DELAYED RELEASE ORAL at 04:41

## 2019-01-02 RX ADMIN — ATORVASTATIN CALCIUM 10 MG: 10 TABLET, FILM COATED ORAL at 04:41

## 2019-01-02 RX ADMIN — LEVETIRACETAM 500 MG: 250 TABLET ORAL at 16:41

## 2019-01-02 ASSESSMENT — PAIN SCALES - GENERAL
PAINLEVEL_OUTOF10: 0

## 2019-01-02 NOTE — PROGRESS NOTES
[facility] Medicine Daily Progress Note    Date of Service  1/2/2019    Chief Complaint  83 y.o. male admitted 12/31/2018 with headache    Hospital Course  Mr. Vincent has a past medical history of dyslipidemia that developed a sudden onset of headache about 11:00 on 12/31/2018.  This was associated with weakness and confusion.  He was brought to the emergency room where a CAT scan of the head revealed a an intracranial hemorrhage.  He was admitted to the intensive care unit and Dr. Hood, neurosurgery has been consulted.    Interval Problem Update  none    Consultants/Specialty  Neurology  Critical care  neurosurgery    Code Status  full    Disposition  pending    Review of Systems  Review of Systems   Unable to perform ROS: Acuity of condition        Physical Exam  Temp:  [36.9 °C (98.4 °F)-37.8 °C (100 °F)] 37.2 °C (98.9 °F)  Pulse:  [58-81] 58  Resp:  [16-22] 16  BP: (113-154)/(57-87) 113/61    Physical Exam   Constitutional: He appears well-developed and well-nourished.   HENT:   Head: Normocephalic and atraumatic.   Eyes: Pupils are equal, round, and reactive to light. Conjunctivae are normal.   Neck: Normal range of motion. Neck supple.   Cardiovascular: Normal rate and regular rhythm.    Pulmonary/Chest: Effort normal and breath sounds normal.   Abdominal: Soft. Bowel sounds are normal.   Musculoskeletal: He exhibits no edema or tenderness.   Neurological: He is alert.   Skin: Skin is warm and dry.       Fluids    Intake/Output Summary (Last 24 hours) at 01/02/19 0944  Last data filed at 01/01/19 1800   Gross per 24 hour   Intake            862.5 ml   Output              325 ml   Net            537.5 ml       Laboratory  Recent Labs      12/31/18   1304  01/01/19   0400  01/02/19   0356   WBC  5.8  7.3  11.2*   RBC  5.66  5.54  5.52   HEMOGLOBIN  17.2  16.5  16.6   HEMATOCRIT  50.3  49.2  49.0   MCV  88.9  88.8  88.8   MCH  30.4  29.8  30.1   MCHC  34.2  33.5*  33.9   RDW  40.6  40.7  39.9   PLATELETCT  95*  95*   98*   MPV  10.3  11.0  10.8     Recent Labs      12/31/18   1304   01/01/19   0400  01/01/19   0910  01/02/19   0356   SODIUM  140   < >  141  139  136   POTASSIUM  4.2   --   4.0   --   4.0   CHLORIDE  104   --   107   --   104   CO2  29   --   25   --   25   GLUCOSE  105*   --   94   --   117*   BUN  17   --   14   --   19   CREATININE  0.94   --   0.91   --   1.09   CALCIUM  9.6   --   9.5   --   9.7    < > = values in this interval not displayed.     Recent Labs      12/31/18   1304   APTT  37.3*   INR  1.13         Recent Labs      12/31/18   1304   TRIGLYCERIDE  111   HDL  37*   LDL  55       Imaging       Assessment/Plan  * Intracranial hemorrhage (HCC)   Assessment & Plan    Non-traumatic   CTA was negative for aneurysm  We will monitor his blood pressure and treat to keep SBP less than 160    Dr Blanca Discussed with neurology Dr Hummel concerned that his memory loss related to seizure secondary to his intracranial hemorrhage we will empirically start him on Keppra follow-up on EEG  MRI brain showed:1.  Grossly stable size of LEFT temporal parenchymal hematoma. No associated enhancement to suggest underlying mass or vascular malformation  2.  New intraventricular extension of blood without hydrocephalus  3.  Foci of remote microhemorrhage in the RIGHT temporal lobe and RIGHT cerebellar hemisphere, possibly related to hypertension or amyloid angiopathy  4.  Mild-to-moderate atrophy  5.  Mild white matter changes  BMP in the morning ordered to follow sodium levels.   Dr. Hood, neurosurgery input is noted and no surgical intervention needed  He has an expressive aphasia.  Neurology input is noted  PT/OT/ST and physiatry consultation.   Neurology input is noted  On keppra  eeg  Showed:This is mildly abnormal routine video EEG recording in the awake and drowsy/sleep state(s).     This scalp EEG denotes                  1. Mild focal cortical dysfunction over frontal --this patten might increase the risk of  frontal seizure - advise clinical correlation regarding management         There are no epileptiform discharges in this record     Secondary hypertension   Assessment & Plan    History of  He was not on any medications as an outpatient.   Normotensive at this time  Iv hydralazine prn     Thrombocytopenia (HCC)   Assessment & Plan    Platelet count has been 95  No previous reference range since normal platelets in 2013  CBC daily     Hyperbilirubinemia   Assessment & Plan    abd exam benign  Otherwise normal LFTs  Consider Shirley     GERD (gastroesophageal reflux disease)   Assessment & Plan    Continue Prilosec     Dyslipidemia   Assessment & Plan    Continue atorvastatin          VTE prophylaxis: scd

## 2019-01-02 NOTE — ASSESSMENT & PLAN NOTE
History of  He was not on any medications as an outpatient.   Normotensive at this time  Iv hydralazine prn

## 2019-01-02 NOTE — DISCHARGE PLANNING
Care Transition Team Assessment    Information Source  Orientation : Oriented x 4  Information Given By: Patient  Who is responsible for making decisions for patient? : Patient         Elopement Risk  Legal Hold: No  Ambulatory or Self Mobile in Wheelchair: No-Not an Elopement Risk  Elopement Risk: Not at Risk for Elopement    Interdisciplinary Discharge Planning  Does Admitting Nurse Feel This Could be a Complex Discharge?: No  Primary Care Physician: Dr. Cummins  Lives with - Patient's Self Care Capacity: Spouse  Patient or legal guardian wants to designate a caregiver (see row info): No  Support Systems: Spouse / Significant Other  Housing / Facility: [address]  Do You Take your Prescribed Medications Regularly: No  Able to Return to Previous ADL's: Yes  Mobility Issues: No  Prior Services: None  Patient Expects to be Discharged to:: home with outpt therapy  Assistance Needed: No  Durable Medical Equipment: Not Applicable    Discharge Preparedness  What is your plan after discharge?: Home with help  What are your discharge supports?: Spouse  Prior Functional Level: Ambulatory, Drives Self, Independent with Activities of Daily Living  Difficulity with ADLs: None  Difficulity with IADLs: Driving    Functional Assesment  Prior Functional Level: Ambulatory, Drives Self, Independent with Activities of Daily Living    Finances  Financial Barriers to Discharge: No  Prescription Coverage: Yes    Vision / Hearing Impairment  Vision Impairment : No  Right Eye Vision: Other (Comments)  Left Eye Vision: Other (Comments)  Hearing Impairment : No              Domestic Abuse  Have you ever been the victim of abuse or violence?: No  Physical Abuse or Sexual Abuse: No  Verbal Abuse or Emotional Abuse: No  Possible Abuse Reported to:: Not Applicable              Anticipated Discharge Information  Anticipated discharge disposition: Home

## 2019-01-02 NOTE — DISCHARGE PLANNING
Anticipated Discharge Disposition:   Home with outpt therapy ( as recommended by PT/OT)    Action:   Met with pt  Assessment completed   Discussed with IDT :  MD indicared that pt not medically clear yet    Barriers to Discharge:   Medical clearance    Plan:   Follow up with MD tomorrow.

## 2019-01-02 NOTE — PROGRESS NOTES
1220 Patient's in bed. Family visiting. No c;/o's at this time. No distress noted. Fall protocol in effect.

## 2019-01-02 NOTE — PROGRESS NOTES
0650 Patient's in bed. Bedside report received from NOC RN (Allyson) at the beginning of the shift.    0850 Patient's sitting up in bed. Daughter visiting No c/o's pain or discomfort at this time. Assessment completed. No distress noted. Plan of care reviewed with the patient & daughter.  Verbalized understanding.

## 2019-01-02 NOTE — THERAPY
"Speech Language Therapy speech treatment completed.   Functional Status:  Pt's children and wife were present for session. SLP provided written and verbal educ regarding word retrieval strategies/aphasia and updated family and pt regarding pt's level of function in regards to communication. Pt with intermittent slow speech and slow processing of auditory information. Family has noted variability in pt's communication. Pt beginning to use circumlocution as a strategy for verbal expression. Pt and family educ regarding use of alphabet board for word retrieval and simple lower level language tasks as pt is able to participate. Pt will benefit from cont SLP services. Pt's family and pt verbalizing understanding.   Recommendations: see above.   Plan of Care: Will benefit from Speech Therapy 5 times per week  Post-Acute Therapy: aphasia. Thanks, Amado    See \"Rehab Therapy-Acute\" Patient Summary Report for complete documentation.     "

## 2019-01-02 NOTE — DISCHARGE PLANNING
Follow up for rehab therapy notes reviewed anticipate outpatient follow up when medically cleared. OT notes no further skilled services, PT notes no further skilled need. Current documentation does not support therapy need for IRF level of care. No physiatery consult ordered per protocol

## 2019-01-02 NOTE — THERAPY
"Physical Therapy Evaluation completed.   Bed Mobility:  Supine to Sit: Supervised  Transfers: Sit to Stand: Stand by Assist  Gait: Level Of Assist: Stand by Assist with No Equipment Needed       Plan of Care: Patient with no further skilled PT needs in the acute care setting at this time  Discharge Recommendations: Equipment: No Equipment Needed. Post-acute therapy Recommend inpatient transitional care services for continued physical therapy services.       See \"Rehab Therapy-Acute\" Patient Summary Report for complete documentation.     "

## 2019-01-02 NOTE — PROGRESS NOTES
Received report from am RN at bedside, accepted care . Pt is calm and alert, speech clear, MADRIGAL,  shows no signs of distress. Negative for headache, no N/V, Pt is breathing normally. No SOB, no, chest pain. Present bowel sounds and pt passing gas. Bowel protocol in place. Voiding well, No needs at this time. POC, Family at bedside,  discussed. Bed in low position call bell within reach, half side rails up, Bed alarm on. Pt resting quietly. Will continue to assess.

## 2019-01-02 NOTE — PROGRESS NOTES
1315 Lidia,SLP worked with the patient. Family visiting.     1425 Placed a call to DR Meredith,  Family wanting to speak to MND. awaiting for response.    1427 Received a call from Dr Meredith, notified the said MD that patient's family would like to speak to MD, per MD he'll be meeting with the family in the family waiting area.    1432 Dr Meredith met/POC discussed with family members in the family waiting area. . Questions answered.

## 2019-01-03 LAB — NUCLEAR IGG SER QL IA: NORMAL

## 2019-01-03 PROCEDURE — 99232 SBSQ HOSP IP/OBS MODERATE 35: CPT | Performed by: FAMILY MEDICINE

## 2019-01-03 PROCEDURE — 700102 HCHG RX REV CODE 250 W/ 637 OVERRIDE(OP): Performed by: PSYCHIATRY & NEUROLOGY

## 2019-01-03 PROCEDURE — 770020 HCHG ROOM/CARE - TELE (206)

## 2019-01-03 PROCEDURE — A9270 NON-COVERED ITEM OR SERVICE: HCPCS | Performed by: HOSPITALIST

## 2019-01-03 PROCEDURE — 700102 HCHG RX REV CODE 250 W/ 637 OVERRIDE(OP): Performed by: HOSPITALIST

## 2019-01-03 PROCEDURE — A9270 NON-COVERED ITEM OR SERVICE: HCPCS | Performed by: PSYCHIATRY & NEUROLOGY

## 2019-01-03 RX ADMIN — ATORVASTATIN CALCIUM 10 MG: 10 TABLET, FILM COATED ORAL at 04:59

## 2019-01-03 RX ADMIN — OMEPRAZOLE 20 MG: 20 CAPSULE, DELAYED RELEASE ORAL at 04:58

## 2019-01-03 RX ADMIN — LEVETIRACETAM 500 MG: 250 TABLET ORAL at 16:55

## 2019-01-03 RX ADMIN — ACETAMINOPHEN 650 MG: 325 TABLET, FILM COATED ORAL at 18:59

## 2019-01-03 RX ADMIN — LEVETIRACETAM 500 MG: 250 TABLET ORAL at 04:57

## 2019-01-03 RX ADMIN — STANDARDIZED SENNA CONCENTRATE AND DOCUSATE SODIUM 2 TABLET: 8.6; 5 TABLET, FILM COATED ORAL at 16:55

## 2019-01-03 RX ADMIN — STANDARDIZED SENNA CONCENTRATE AND DOCUSATE SODIUM 2 TABLET: 8.6; 5 TABLET, FILM COATED ORAL at 04:57

## 2019-01-03 ASSESSMENT — PAIN SCALES - GENERAL
PAINLEVEL_OUTOF10: 0
PAINLEVEL_OUTOF10: 4
PAINLEVEL_OUTOF10: 0
PAINLEVEL_OUTOF10: 0

## 2019-01-03 NOTE — DISCHARGE PLANNING
Aware of repeat PMR referral from Dr. Meredith. Current documentation does not support 2/3 therapy need meeting CMS criteria for IRF level care. PT/OT recommending no further skilled need in acute setting. No Physiatry consult ordered per protocol.

## 2019-01-03 NOTE — PROGRESS NOTES
1125 Patient's sitting up in bed. No c/o's at this time. Family visiting. distress noted. Fall Protocol in effect.

## 2019-01-03 NOTE — PROGRESS NOTES
1910 Patient's sitting up in bed. Family visiting. No changes in status. Bedside report given to Wilver PAINTING RN (Gavin).

## 2019-01-03 NOTE — PROGRESS NOTES
1305 Patient's sitting up in bed,having lunch. Family visiting. No distress noted. Fall Protocol in effect.

## 2019-01-03 NOTE — PROGRESS NOTES
0710 Patient's in bed. Bedside report received from NOC RN (Saúl) at the beginning of the shift. No distress noted.    0735 Patient's sitting up in bed, having Breakfast. No c/o's pain or discomfort at this time. Fall Protocol in effect. Call light within reach. Reminded patient to call for assist. Assessment completed. No distress noted. Plan of care reviewed with the patient. Verbalized understanding. Dr Meredith visitfd, POC discussed with the patient. Verbalized understanding.    0940 Patient's in bed, no c/o's at this time. No distress noted. Family visiting. Fall Protocol in effect.

## 2019-01-03 NOTE — DISCHARGE PLANNING
Anticipated Discharge Disposition:   Home with outpt therapy    Action:   Pt and wife opted for outpt therapy.   Wife said she will be available 24/7    Barriers to Discharge:   Surgical clearance    Plan:   RN kindly notify CM if there are any discharge concerns.

## 2019-01-03 NOTE — PROGRESS NOTES
[facility] Medicine Daily Progress Note    Date of Service  1/3/2019    Chief Complaint  83 y.o. male admitted 12/31/2018 with headache    Hospital Course  Mr. Vincent has a past medical history of dyslipidemia that developed a sudden onset of headache about 11:00 on 12/31/2018.  This was associated with weakness and confusion.  He was brought to the emergency room where a CAT scan of the head revealed a an intracranial hemorrhage.  He was admitted to the intensive care unit and Dr. Hood, neurosurgery has been consulted.    Interval Problem Update  none    Consultants/Specialty  Neurology  Critical care  neurosurgery    Code Status  full    Disposition  pending    Review of Systems  Review of Systems   Unable to perform ROS: Acuity of condition        Physical Exam  Temp:  [37.1 °C (98.7 °F)-37.9 °C (100.2 °F)] 37.4 °C (99.3 °F)  Pulse:  [60-69] 61  Resp:  [16-18] 16  BP: (122-137)/(61-69) 122/64    Physical Exam   Constitutional: He appears well-developed and well-nourished.   HENT:   Head: Normocephalic and atraumatic.   Eyes: Pupils are equal, round, and reactive to light. Conjunctivae are normal.   Neck: Normal range of motion. Neck supple.   Cardiovascular: Normal rate and regular rhythm.    Pulmonary/Chest: Effort normal and breath sounds normal.   Abdominal: Soft. Bowel sounds are normal.   Musculoskeletal: He exhibits no edema or tenderness.   Neurological: He is alert.   Still has some aphasia   Skin: Skin is warm and dry.       Fluids    Intake/Output Summary (Last 24 hours) at 01/03/19 1012  Last data filed at 01/03/19 0600   Gross per 24 hour   Intake              240 ml   Output                0 ml   Net              240 ml       Laboratory  Recent Labs      12/31/18   1304  01/01/19   0400  01/02/19   0356   WBC  5.8  7.3  11.2*   RBC  5.66  5.54  5.52   HEMOGLOBIN  17.2  16.5  16.6   HEMATOCRIT  50.3  49.2  49.0   MCV  88.9  88.8  88.8   MCH  30.4  29.8  30.1   MCHC  34.2  33.5*  33.9   RDW  40.6  40.7   39.9   PLATELETCT  95*  95*  98*   MPV  10.3  11.0  10.8     Recent Labs      12/31/18   1304   01/01/19   0400  01/01/19   0910  01/02/19   0356   SODIUM  140   < >  141  139  136   POTASSIUM  4.2   --   4.0   --   4.0   CHLORIDE  104   --   107   --   104   CO2  29   --   25   --   25   GLUCOSE  105*   --   94   --   117*   BUN  17   --   14   --   19   CREATININE  0.94   --   0.91   --   1.09   CALCIUM  9.6   --   9.5   --   9.7    < > = values in this interval not displayed.     Recent Labs      12/31/18   1304   APTT  37.3*   INR  1.13         Recent Labs      12/31/18   1304   TRIGLYCERIDE  111   HDL  37*   LDL  55       Imaging       Assessment/Plan  * Intracranial hemorrhage (HCC)   Assessment & Plan    Non-traumatic   CTA was negative for aneurysm  We will monitor his blood pressure and treat to keep SBP less than 160    Dr Blanca Discussed with neurology Dr Hummel concerned that his memory loss related to seizure secondary to his intracranial hemorrhage we will empirically start him on Keppra follow-up on EEG  MRI brain showed:1.  Grossly stable size of LEFT temporal parenchymal hematoma. No associated enhancement to suggest underlying mass or vascular malformation  2.  New intraventricular extension of blood without hydrocephalus  3.  Foci of remote microhemorrhage in the RIGHT temporal lobe and RIGHT cerebellar hemisphere, possibly related to hypertension or amyloid angiopathy  4.  Mild-to-moderate atrophy  5.  Mild white matter changes  BMP in the morning ordered to follow sodium levels.   Dr. Hood, neurosurgery input is noted and no surgical intervention needed  He has an expressive aphasia.  Neurology input is noted  PT/OT/ST    physiatry consultation. Is still pending  SNF referal is placed  D/w the family extensively yesterday  Seen with the nurse at the bed side  On keppra  eeg  Showed:This is mildly abnormal routine video EEG recording in the awake and drowsy/sleep state(s).     This scalp EEG  denotes                  1. Mild focal cortical dysfunction over frontal --this patten might increase the risk of frontal seizure - advise clinical correlation regarding management         There are no epileptiform discharges in this record     Secondary hypertension   Assessment & Plan    History of  He was not on any medications as an outpatient.   Normotensive at this time  Iv hydralazine prn     Thrombocytopenia (HCC)   Assessment & Plan    Platelet count has been 95  No previous reference range since normal platelets in 2013  CBC daily     Hyperbilirubinemia   Assessment & Plan    abd exam benign  Otherwise normal LFTs  Consider Daiana     GERD (gastroesophageal reflux disease)   Assessment & Plan    Continue Prilosec     Dyslipidemia   Assessment & Plan    Continue atorvastatin          VTE prophylaxis: scd

## 2019-01-03 NOTE — PROGRESS NOTES
9933 Patient called for assist. Assisted patient to the BR, voided,   brushed his teeth, washed his hands then back to bed. Fall protocol in effect.

## 2019-01-04 ENCOUNTER — PATIENT OUTREACH (OUTPATIENT)
Dept: HEALTH INFORMATION MANAGEMENT | Facility: OTHER | Age: 84
End: 2019-01-04

## 2019-01-04 ENCOUNTER — HOME HEALTH ADMISSION (OUTPATIENT)
Dept: HOME HEALTH SERVICES | Facility: HOME HEALTHCARE | Age: 84
End: 2019-01-04
Payer: MEDICARE

## 2019-01-04 VITALS
HEART RATE: 70 BPM | OXYGEN SATURATION: 94 % | TEMPERATURE: 98.4 F | RESPIRATION RATE: 18 BRPM | SYSTOLIC BLOOD PRESSURE: 128 MMHG | BODY MASS INDEX: 25.11 KG/M2 | HEIGHT: 67 IN | DIASTOLIC BLOOD PRESSURE: 64 MMHG | WEIGHT: 160 LBS

## 2019-01-04 LAB
ALBUMIN SERPL BCP-MCNC: 4.1 G/DL (ref 3.2–4.9)
ALBUMIN/GLOB SERPL: 1.8 G/DL
ALP SERPL-CCNC: 47 U/L (ref 30–99)
ALT SERPL-CCNC: 12 U/L (ref 2–50)
ANION GAP SERPL CALC-SCNC: 8 MMOL/L (ref 0–11.9)
AST SERPL-CCNC: 7 U/L (ref 12–45)
BASOPHILS # BLD AUTO: 0.3 % (ref 0–1.8)
BASOPHILS # BLD: 0.02 K/UL (ref 0–0.12)
BILIRUB SERPL-MCNC: 1.8 MG/DL (ref 0.1–1.5)
BUN SERPL-MCNC: 25 MG/DL (ref 8–22)
CALCIUM SERPL-MCNC: 9.3 MG/DL (ref 8.5–10.5)
CHLORIDE SERPL-SCNC: 105 MMOL/L (ref 96–112)
CO2 SERPL-SCNC: 27 MMOL/L (ref 20–33)
CREAT SERPL-MCNC: 1.06 MG/DL (ref 0.5–1.4)
EOSINOPHIL # BLD AUTO: 0.02 K/UL (ref 0–0.51)
EOSINOPHIL NFR BLD: 0.3 % (ref 0–6.9)
ERYTHROCYTE [DISTWIDTH] IN BLOOD BY AUTOMATED COUNT: 40.6 FL (ref 35.9–50)
GLOBULIN SER CALC-MCNC: 2.3 G/DL (ref 1.9–3.5)
GLUCOSE SERPL-MCNC: 109 MG/DL (ref 65–99)
HCT VFR BLD AUTO: 48.6 % (ref 42–52)
HGB BLD-MCNC: 16.5 G/DL (ref 14–18)
IMM GRANULOCYTES # BLD AUTO: 0.04 K/UL (ref 0–0.11)
IMM GRANULOCYTES NFR BLD AUTO: 0.6 % (ref 0–0.9)
LYMPHOCYTES # BLD AUTO: 1.77 K/UL (ref 1–4.8)
LYMPHOCYTES NFR BLD: 24.5 % (ref 22–41)
MCH RBC QN AUTO: 30.2 PG (ref 27–33)
MCHC RBC AUTO-ENTMCNC: 34 G/DL (ref 33.7–35.3)
MCV RBC AUTO: 89 FL (ref 81.4–97.8)
MONOCYTES # BLD AUTO: 2.31 K/UL (ref 0–0.85)
MONOCYTES NFR BLD AUTO: 32 % (ref 0–13.4)
NEUTROPHILS # BLD AUTO: 3.05 K/UL (ref 1.82–7.42)
NEUTROPHILS NFR BLD: 42.3 % (ref 44–72)
NRBC # BLD AUTO: 0 K/UL
NRBC BLD-RTO: 0 /100 WBC
PLATELET # BLD AUTO: 98 K/UL (ref 164–446)
PMV BLD AUTO: 11 FL (ref 9–12.9)
POTASSIUM SERPL-SCNC: 3.9 MMOL/L (ref 3.6–5.5)
PROT SERPL-MCNC: 6.4 G/DL (ref 6–8.2)
RBC # BLD AUTO: 5.46 M/UL (ref 4.7–6.1)
SODIUM SERPL-SCNC: 140 MMOL/L (ref 135–145)
WBC # BLD AUTO: 7.2 K/UL (ref 4.8–10.8)

## 2019-01-04 PROCEDURE — A9270 NON-COVERED ITEM OR SERVICE: HCPCS | Performed by: PSYCHIATRY & NEUROLOGY

## 2019-01-04 PROCEDURE — 85025 COMPLETE CBC W/AUTO DIFF WBC: CPT

## 2019-01-04 PROCEDURE — 99239 HOSP IP/OBS DSCHRG MGMT >30: CPT | Performed by: FAMILY MEDICINE

## 2019-01-04 PROCEDURE — A9270 NON-COVERED ITEM OR SERVICE: HCPCS | Performed by: HOSPITALIST

## 2019-01-04 PROCEDURE — 80053 COMPREHEN METABOLIC PANEL: CPT

## 2019-01-04 PROCEDURE — 36415 COLL VENOUS BLD VENIPUNCTURE: CPT

## 2019-01-04 PROCEDURE — 700102 HCHG RX REV CODE 250 W/ 637 OVERRIDE(OP): Performed by: HOSPITALIST

## 2019-01-04 PROCEDURE — 700102 HCHG RX REV CODE 250 W/ 637 OVERRIDE(OP): Performed by: PSYCHIATRY & NEUROLOGY

## 2019-01-04 RX ORDER — LEVETIRACETAM 500 MG/1
500 TABLET ORAL 2 TIMES DAILY
Qty: 28 TAB | Refills: 0 | Status: SHIPPED | OUTPATIENT
Start: 2019-01-04 | End: 2019-01-04

## 2019-01-04 RX ORDER — LEVETIRACETAM 500 MG/1
500 TABLET ORAL 2 TIMES DAILY
Qty: 28 TAB | Refills: 0 | Status: SHIPPED | OUTPATIENT
Start: 2019-01-04 | End: 2019-03-01

## 2019-01-04 RX ADMIN — LEVETIRACETAM 500 MG: 250 TABLET ORAL at 05:23

## 2019-01-04 RX ADMIN — ACETAMINOPHEN 650 MG: 325 TABLET, FILM COATED ORAL at 15:07

## 2019-01-04 RX ADMIN — ATORVASTATIN CALCIUM 10 MG: 10 TABLET, FILM COATED ORAL at 05:23

## 2019-01-04 RX ADMIN — OMEPRAZOLE 20 MG: 20 CAPSULE, DELAYED RELEASE ORAL at 05:23

## 2019-01-04 RX ADMIN — STANDARDIZED SENNA CONCENTRATE AND DOCUSATE SODIUM 2 TABLET: 8.6; 5 TABLET, FILM COATED ORAL at 05:23

## 2019-01-04 ASSESSMENT — PATIENT HEALTH QUESTIONNAIRE - PHQ9
1. LITTLE INTEREST OR PLEASURE IN DOING THINGS: NOT AT ALL
2. FEELING DOWN, DEPRESSED, IRRITABLE, OR HOPELESS: NOT AT ALL
1. LITTLE INTEREST OR PLEASURE IN DOING THINGS: NOT AT ALL
SUM OF ALL RESPONSES TO PHQ9 QUESTIONS 1 AND 2: 0
2. FEELING DOWN, DEPRESSED, IRRITABLE, OR HOPELESS: NOT AT ALL
SUM OF ALL RESPONSES TO PHQ9 QUESTIONS 1 AND 2: 0

## 2019-01-04 ASSESSMENT — PAIN SCALES - GENERAL
PAINLEVEL_OUTOF10: 0
PAINLEVEL_OUTOF10: 0

## 2019-01-04 NOTE — PROGRESS NOTES
0650 Patient's sitting up in bed. Bedside report received from NOC RN (Flora) at the beginning of the shift. No distress noted.     0745 Patient's sitting up in bed, having breakfast. No c/o's pain or discomfort at this time. Fall Protocol in effect. Call light reach. Reminded patient to call for assist. Assessment completed. No distress noted. Plan of care reviewed with the patient. Verbalized understanding. Dr Meredith visited. POC discussed with the patient.    0832 New order for the patient to be discharged home.     0915 Patient's in bed. Family visiting. No distress noted.

## 2019-01-04 NOTE — PROGRESS NOTES
1230 patient's sitting up in bed, having lunch. No c/o's at this time. No distress noted.    1507  Patient's sitting up in bed.  Medicated with Tylenol (see MAR) for c/o's head ache, rates pain 4/10.

## 2019-01-04 NOTE — PROGRESS NOTES
1859 Medicated with Tylenol (see MAR) for c/o's headache, rates pain 4/10.  Patient's sitting up in bed. No changes in status. Bedside report given to Oncoming GARIMA RN (Flora).

## 2019-01-04 NOTE — FACE TO FACE
Face to Face Supporting Documentation - Home Health    The encounter with this patient was in whole or in part the primary reason for home health admission.    Date of encounter:   Patient:                    MRN:                       YOB: 2017  Elias Vincent  [id number]  1935     Home health to see patient for:  Speech Language Pathology evaluation and treatment    Skilled need for:  New Onset Medical Diagnosis intracranial hemorrage    Skilled nursing interventions to include:  Comment: intracranial hemorrage    Homebound status evidenced by:  Needs the assistance of another person in order to leave the home. Leaving home requires a considerable and taxing effort. There is a normal inability to leave the home.    Community Physician to provide follow up care: Finn Cummins D.O.     Optional Interventions? No      I certify the face to face encounter for this home health care referral meets the CMS requirements and the encounter/clinical assessment with the patient was, in whole, or in part, for the medical condition(s) listed above, which is the primary reason for home health care. Based on my clinical findings: the service(s) are medically necessary, support the need for home health care, and the homebound criteria are met.  I certify that this patient has had a face to face encounter by myself.  Eri Meredith M.D. - NPI: [id number]

## 2019-01-04 NOTE — DISCHARGE INSTRUCTIONS
Hemorrhagic Stroke  A hemorrhagic stroke is the sudden death of brain tissue that occurs when a blood vessel in the brain leaks or bursts (ruptures). When this happens, certain areas of the brain do not get enough oxygen, and blood builds up and presses on certain areas of the brain (hemorrhage). Lack of oxygen and pressure from hemorrhaging can lead to brain damage.  There are two major types of hemorrhagic stroke, depending on where bleeding occurs. If bleeding occurs within the brain tissue, the condition is called an intracerebral hemorrhage. If bleeding occurs in the area between the brain and the membrane that covers the brain (subarachnoid space), the condition is called a subarachnoid hemorrhage. Hemorrhagic stroke is a medical emergency. It can cause temporary or permanent brain damage and loss of brain function.  What are the causes?  This condition is caused by a blood vessel leaking or rupturing, which may be the result of:  · Part of a weakened blood vessel wall bulging or ballooning out (cerebral aneurysm).  · A hardened, thin blood vessel cracking open and allowing blood to leak out. Blood vessels may become hardened and thin due to plaque buildup.  · Tangled blood vessels in the brain (brain arteriovenous malformation).  · Protein buildup on artery walls in the brain (amyloid angiopathy).  · Inflamed blood vessels (vasculitis).  · A tumor in the brain.  · High blood pressure (hypertension).  What increases the risk?  The following factors may make you more likely to develop this condition:  · Hypertension.  · Having abnormal blood vessels present since birth (congenital abnormality).  · Bleeding disorders, such as hemophilia, sickle cell disease, or liver disease.  · The blood becoming too thin while taking blood thinners (anticoagulants).  · Aging.  · Moderate or heavy alcohol use.  · Using drugs, such as cocaine or methamphetamines.  What are the signs or symptoms?  Symptoms of this condition  usually appear suddenly, and may include:  · Weakness or numbness of the face, arm, or leg, especially on one side of the body.  · Confusion.  · Difficulty speaking (aphasia) or understanding speech.  · Difficulty seeing out of one or both eyes.  · Difficulty walking or moving the arms or legs.  · Dizziness.  · Loss of balance or coordination.  · Seizures.  · A severe headache with no known cause. This headache may feel like the worst headache ever experienced.  How is this diagnosed?  This condition may be diagnosed based on:  · Your symptoms.  · Your medical history.  · A physical exam.  · Tests, including:  ¨ Blood tests.  ¨ CT scan.  ¨ MRI.  · Angiogram. In this procedure, dye is injected through a long, thin tube (catheter) into one of your arteries. Then, X-rays are taken. The X-rays will show whether there is a blockage or a problem in a blood vessel.  How is this treated?  This condition is a medical emergency that must be treated in a hospital immediately. The goals of treatment are to stop bleeding, reduce pressure on the brain, and relieve symptoms. Treatment may include:  · Medicines that:  ¨ Lower blood pressure (antihypertensives).  ¨ Relieve pain (analgesics).  ¨ Relieve nausea or vomiting.  ¨ Stop or prevent seizures (anticonvulsants).  ¨ Relieve fever.  ¨ Prevent blood vessels in the brain from spasming in response to bleeding.  ¨ Control bleeding in the brain.  · Assisted breathing (ventilation). This involves using a machine to help you breathe (ventilator).  · Receiving donated blood products through an IV tube (transfusion). You will receive cells that help your blood clot.  · Placement of a tube (shunt) in the brain to relieve pressure.  · Physical, speech, or occupational therapy.  · Surgery to stop bleeding, remove a blood clot or tumor, or reduce pressure.  Treatment depends on the cause, severity, and duration of symptoms.  Medicines and changes to your diet may be used to help treat and  manage risk factors for stroke, such as diabetes and high blood pressure.  Follow these instructions at home:  Activity  · Return to your normal activities as told by your health care provider. Ask your health care provider what activities are safe for you.  · Rest. Rest helps the brain to heal. Make sure you:  ¨ Get plenty of sleep. Avoid staying up late at night.  ¨ Keep a consistent sleep schedule. Try to go to sleep and wake up at about the same time every day.  ¨ Avoid activities that cause physical or mental stress.  General instructions  · Take over-the-counter and prescription medicines only as told by your health care provider.  · Do not drive or operate heavy machinery until your health care provider approves.  · Limit alcohol intake to no more than 1 drink per day for nonpregnant women and 2 drinks per day for men. One drink equals 12 oz of beer, 5 oz of wine, or 1½ oz of hard liquor.  · Use a walker or a cane as told by your health care provider.  · Keep all follow-up visits as told by your health care provider, including visits with therapists. This is important.  How is this prevented?  Your risk of stroke can be decreased by working with your health care provider to treat high blood pressure, high cholesterol, diabetes, heart disease, and obesity. Your risk of stroke can also be decreased by quitting smoking, limiting alcohol, and staying physically active.  If you take the blood thinner warfarin, have your bloodwork monitored frequently by your health care provider.  Contact a health care provider if:  You develop any of the following symptoms:  · Headaches that keep coming back (chronic headaches).  · Nausea.  · Vision problems.  · Increased sensitivity to noise or light.  · Depression or mood swings.  · Anxiety or irritability.  · Memory problems.  · Difficulty concentrating or paying attention.  · Sleep problems.  · Feeling tired all of the time.  Recovery from hemorrhagic stroke varies widely.  Talk with your health care provider about what to expect during your recovery.  Get help right away if:  · You develop symptoms of a hemorrhagic stroke.  · You have a partial or total loss of consciousness.  · You are taking blood thinners and you fall or you experience minor injury (trauma) to the head.  · You have a bleeding disorder and you fall or you experience minor trauma to the head.  These symptoms may represent a serious problem that is an emergency. Do not wait to see if the symptoms will go away. Get medical help right away. Call your local emergency services (911 in the [place] Do not drive yourself to the hospital.   This information is not intended to replace advice given to you by your health care provider. Make sure you discuss any questions you have with your health care provider.  Document Released: 06/07/2011 Document Revised: 05/25/2017 Document Reviewed: 12/11/2016  [employer] Interactive Patient Education © 2017 [employer]  Discharge Instructions    Discharged to home by car with relative. Discharged via wheelchair, hospital escort: Yes.  Special equipment needed: Not Applicable    Be sure to schedule a follow-up appointment with your primary care doctor or any specialists as instructed.     Discharge Plan:   Influenza Vaccine Indication: Not indicated: Previously immunized this influenza season and > 8 years of age    I understand that a diet low in cholesterol, fat, and sodium is recommended for good health. Unless I have been given specific instructions below for another diet, I accept this instruction as my diet prescription.   Other diet: Regular    Special Instructions:     [facility] to follow   Tel # [phone]                                        Follow-up with PCP, call for appointment in one week.                                        Follow-up with Neurology in 6-8 weeks, call for appointment Tel # [phone].    · Is patient discharged on Warfarin / Coumadin?   No      Depression / Suicide Risk    As you are discharged from this [facility] facility, it is important to learn how to keep safe from harming yourself.    Recognize the warning signs:  · Abrupt changes in personality, positive or negative- including increase in energy   · Giving away possessions  · Change in eating patterns- significant weight changes-  positive or negative  · Change in sleeping patterns- unable to sleep or sleeping all the time   · Unwillingness or inability to communicate  · Depression  · Unusual sadness, discouragement and loneliness  · Talk of wanting to die  · Neglect of personal appearance   · Rebelliousness- reckless behavior  · Withdrawal from people/activities they love  · Confusion- inability to concentrate     If you or a loved one observes any of these behaviors or has concerns about self-harm, here's what you can do:  · Talk about it- your feelings and reasons for harming yourself  · Remove any means that you might use to hurt yourself (examples: pills, rope, extension cords, firearm)  · Get professional help from the community (Mental Health, Substance Abuse, psychological counseling)  · Do not be alone:Call your Safe Contact- someone whom you trust who will be there for you.  · Call your local CRISIS HOTLINE 995-8231 or 563-562-0936  · Call your local Children's Mobile Crisis Response Team [place] (892) 499-7462 or www.[employer]  · Call the toll free National Suicide Prevention Hotlines   · National Suicide Prevention Lifeline 800-984-DLFQ (6440)  · National Hope Line Network 800-SUICIDE (260-7949)    Intracranial Hemorrhage  An intracranial hemorrhage is bleeding in the layers between the skull (cranium) and brain. A blood vessel bursts and allows blood to leak inside the cranial cavity. The leaking blood then collects (hematoma). This causes pressure and damage to brain cells. The bleeding can be mild to severe. In severe cases, it can lead to permanent damage or  death. Symptoms may come on suddenly or develop over time. Early diagnosis and treatment leads to better recovery.  There are four types of intracranial hemorrhage: subarachnoid, subdural, extradural, or cerebral hemorrhage.  What are the causes?  · Head injury (trauma).  · Ruptured brain aneurysm.  · Bleeding from blood vessels that develop abnormally (arteriovenous malformation).  · Bleeding disorder.  · Use of blood thinners (anticoagulants).  · Use of certain drugs, such as cocaine.  For some people with intracranial hemorrhage, the cause is unknown.  What increases the risk?  · Using tobacco products, such as cigarettes and chewing tobacco.  · Having high blood pressure (hypertension).  · Abusing alcohol.  · Being a female, especially of postmenopausal age.  · Having a family history of disease in the blood vessels of the brain (cerebrovascular disease).  · Having certain genetic syndromes that result in kidney disease or connective tissue disease.  What are the signs or symptoms?  · A sudden, severe headache with no known cause. The headache is often described as the worst headache ever experienced.  · Nausea or vomiting, especially when combined with other symptoms such as a headache.  · Sudden weakness or numbness of the face, arm, or leg, especially on one side of the body.  · Sudden trouble walking or difficulty moving arms or legs.  · Sudden confusion.  · Sudden personality changes.  · Trouble speaking (aphasia) or understanding.  · Difficulty swallowing.  · Sudden trouble seeing in one or both eyes.  · Double vision.  · Dizziness.  · Loss of balance or coordination.  · Intolerance to light.  · Stiff neck.  How is this diagnosed?  Your health care provider will perform a physical exam and ask about your symptoms. If an intracranial hemorrhage is suspected, various tests may be ordered. These tests may include:  · A CT scan.  · An MRI.  · A cerebral angiogram.  · A spinal tap (lumbar puncture).  · Blood  tests.  How is this treated?  Immediate treatment in the hospital is often required to reduce the risk of brain damage. Treatment will depend on the cause of the bleeding, where it is located, and the extent of the bleeding and damage. The goals of treatment include stopping the bleeding, repairing the cause of bleeding, providing relief of symptoms, and preventing problems.  · Medicines may be given to:  ¨ Lower blood pressure (antihypertensives).  ¨ Relieve pain (analgesics).  ¨ Relieve nausea or vomiting.  · Surgery may be needed to stop the bleeding, repair the cause of the bleeding, or remove the blood.  · Rehabilitation may be needed to improve any cognitive and day-to-day functions impaired by the condition.  Further treatment depends on the duration, severity, and cause of your symptoms. Physical, speech, and occupational therapists will assess you and work to improve any functions impaired by the intracranial hemorrhage. Measures will be taken to prevent short-term and long-term problems, including infection from breathing foreign material into the lungs (aspiration pneumonia), blood clots in the legs, bedsores, and falls.  Follow these instructions at home:  · Take medicines only as directed by your health care provider.  · Eat healthy foods as directed by your health care provider:  ¨ A diet low in salt (sodium), saturated fat, trans fat, and cholesterol may be recommended to manage your blood pressure.  ¨ Foods may need to be soft or pureed, or small bites may need to be taken in order to avoid aspirating or choking.  ¨ If studies show that your ability to swallow safely has been affected, you may need to seek help from specialists such as a dietitian, speech and language pathologist, or an occupational therapist. These health care providers can teach you how to safely get the nutrition your body needs.  · Rest and limit activities or movements as directed by your health care provider.  · Do not use any  tobacco products including cigarettes, chewing tobacco, or electronic cigarettes. If you need help quitting, ask your health care provider.  · Limit alcohol intake to no more than 1 drink per day for nonpregnant women and 2 drinks per day for men. One drink equals 12 ounces of beer, 5 ounces of wine, or 1½ ounces of hard liquor.  · Make any other lifestyle changes as directed by your health care provider.  · Monitor and record your blood pressure as directed by your health care provider.  · A safe home environment is important to reduce the risk of falls. Your health care provider may arrange for specialists to evaluate your home. Having grab bars in the bedroom and bathroom is often important. Your health care provider may arrange for special equipment to be used at home, such as raised toilets and a seat for the shower.  · Do physical, occupational, and speech therapy as directed by your health care provider. Ongoing therapy may be needed to maximize your recovery.  · Use a walker or a cane at all times if directed by your health care provider.  · Keep all follow-up visits with your health care provider and other specialists. This is important. This includes any referrals, physical therapy, and rehabilitation.  Get help right away if:  · You have a sudden, severe headache with no known cause.  · You have nausea or vomiting occurring with another symptom.  · You have sudden weakness or numbness of the face, arm, or leg, especially on one side of the body.  · You have sudden trouble walking or difficulty moving your arms or legs.  · You have sudden confusion.  · You have trouble speaking (aphasia) or understanding.  · You have sudden trouble seeing in one or both eyes.  · You have a sudden loss of balance or coordination.  · You have a stiff neck.  · You have difficulty breathing.  · You have a partial or total loss of consciousness.  These symptoms may represent a serious problem that is an emergency. Do not wait  to see if the symptoms will go away. Get medical help right away. Call your local emergency services (911 in the [place] Do not drive yourself to the hospital.   This information is not intended to replace advice given to you by your health care provider. Make sure you discuss any questions you have with your health care provider.  Document Released: 07/15/2015 Document Revised: 05/19/2017 Document Reviewed: 02/11/2015  [employer] Interactive Patient Education © 2017 [employer].  Levetiracetam tablets  What is this medicine?  LEVETIRACETAM (grant angel luis bustamante RA, se james) is an antiepileptic drug. It is used with other medicines to treat certain types of seizures.  This medicine may be used for other purposes; ask your health care provider or pharmacist if you have questions.  COMMON BRAND NAME(S): Anne Marie Dickson  What should I tell my health care provider before I take this medicine?  They need to know if you have any of these conditions:  -kidney disease  -suicidal thoughts, plans, or attempt; a previous suicide attempt by you or a family member  -an unusual or allergic reaction to levetiracetam, other medicines, foods, dyes, or preservatives  -pregnant or trying to get pregnant  -breast-feeding  How should I use this medicine?  Take this medicine by mouth with a glass of water. Follow the directions on the prescription label. Swallow the tablets whole. Do not crush or chew this medicine. You may take this medicine with or without food. Take your doses at regular intervals. Do not take your medicine more often than directed. Do not stop taking this medicine or any of your seizure medicines unless instructed by your doctor or health care professional. Stopping your medicine suddenly can increase your seizures or their severity.  A special MedGuide will be given to you by the pharmacist with each prescription and refill. Be sure to read this information carefully each time.  Contact your pediatrician or health care  professional regarding the use of this medication in children. While this drug may be prescribed for children as young as 4 years of age for selected conditions, precautions do apply.  Overdosage: If you think you have taken too much of this medicine contact a poison control center or emergency room at once.  NOTE: This medicine is only for you. Do not share this medicine with others.  What if I miss a dose?  If you miss a dose, take it as soon as you can. If it is almost time for your next dose, take only that dose. Do not take double or extra doses.  What may interact with this medicine?  This medicine may interact with the following medications:  -carbamazepine  -colesevelam  -probenecid  -sevelamer  This list may not describe all possible interactions. Give your health care provider a list of all the medicines, herbs, non-prescription drugs, or dietary supplements you use. Also tell them if you smoke, drink alcohol, or use illegal drugs. Some items may interact with your medicine.  What should I watch for while using this medicine?  Visit your doctor or health care professional for a regular check on your progress. Wear a medical identification bracelet or chain to say you have epilepsy, and carry a card that lists all your medications.  It is important to take this medicine exactly as instructed by your health care professional. When first starting treatment, your dose may need to be adjusted. It may take weeks or months before your dose is stable. You should contact your doctor or health care professional if your seizures get worse or if you have any new types of seizures.  You may get drowsy or dizzy. Do not drive, use machinery, or do anything that needs mental alertness until you know how this medicine affects you. Do not stand or sit up quickly, especially if you are an older patient. This reduces the risk of dizzy or fainting spells. Alcohol may interfere with the effect of this medicine. Avoid alcoholic  drinks.  The use of this medicine may increase the chance of suicidal thoughts or actions. Pay special attention to how you are responding while on this medicine. Any worsening of mood, or thoughts of suicide or dying should be reported to your health care professional right away.  Women who become pregnant while using this medicine may enroll in the [place] Antiepileptic Drug Pregnancy Registry by calling 1-607.493.3183. This registry collects information about the safety of antiepileptic drug use during pregnancy.  What side effects may I notice from receiving this medicine?  Side effects that you should report to your doctor or health care professional as soon as possible:  -allergic reactions like skin rash, itching or hives, swelling of the face, lips, or tongue  -breathing problems  -dark urine  -general ill feeling or flu-like symptoms  -problems with balance, talking, walking  -unusually weak or tired  -worsening of mood, thoughts or actions of suicide or dying  -yellowing of the eyes or skin  Side effects that usually do not require medical attention (report to your doctor or health care professional if they continue or are bothersome):  -diarrhea  -dizzy, drowsy  -headache  -loss of appetite  This list may not describe all possible side effects. Call your doctor for medical advice about side effects. You may report side effects to FDA at 0-079-FDA-9758.  Where should I keep my medicine?  Keep out of reach of children.  Store at room temperature between 15 and 30 degrees C (59 and 86 degrees F). Throw away any unused medicine after the expiration date.  NOTE: This sheet is a summary. It may not cover all possible information. If you have questions about this medicine, talk to your doctor, pharmacist, or health care provider.  © 2018 [employer]vier/[employer] (2017-01-19 09:43:54)

## 2019-01-04 NOTE — DISCHARGE PLANNING
Received Choice form at 1031  Agency/Facility Name: [facility] [place]  Referral sent per Choice form at 1047

## 2019-01-04 NOTE — DISCHARGE PLANNING
ATTN: Case Management  RE: Referral for Home Health    As of 1/4/2019, we have accepted the Home Health referral for the patient listed above.    A [facility] clinician will be out to see the patient within 48 hours. If you have any questions or concerns regarding the patient’s transition to Home Health, please do not hesitate to contact us at [phone].      We look forward to collaborating with you,  [facility] Team

## 2019-01-04 NOTE — PROGRESS NOTES
1535 Patient's in bed. No c/o's at this time. No distress noted. Fall Protocol in effect. Family visiting.

## 2019-01-04 NOTE — DISCHARGE PLANNING
Anticipated Discharge Disposition:   [facility]    Action:    -Spoke with patient and family and provided information on home health services.  Choice form obtained and faxed to [facility].  -Family stated that an outpatient speech therapy appointment was made for 2-15-19.  -Family asked to speak with Dr. Meredith and he directly spoke to them and answered their questions.    Barriers to Discharge:    [facility] acceptance    Plan:    Wait for [facility] acceptance.    Addendum:  [facility] has accepted patient.  RN CM notified patient, family, and bedside [facility]IM[facility] Araya.

## 2019-01-04 NOTE — PROGRESS NOTES
1537  Discharge instructions given to the patient, spouse & daughter. Verbalized understanding. Patient was discharged with patient's belongings, prescription for Keppra via w/c accompanied by one female CNA, spouse & daughter via Private car.

## 2019-01-07 ENCOUNTER — HOME CARE VISIT (OUTPATIENT)
Dept: HOME HEALTH SERVICES | Facility: HOME HEALTHCARE | Age: 84
End: 2019-01-07
Payer: MEDICARE

## 2019-01-07 VITALS
TEMPERATURE: 99.5 F | WEIGHT: 152.6 LBS | HEIGHT: 67 IN | HEART RATE: 64 BPM | DIASTOLIC BLOOD PRESSURE: 66 MMHG | OXYGEN SATURATION: 96 % | RESPIRATION RATE: 18 BRPM | BODY MASS INDEX: 23.95 KG/M2 | SYSTOLIC BLOOD PRESSURE: 118 MMHG

## 2019-01-07 PROCEDURE — G0493 RN CARE EA 15 MIN HH/HOSPICE: HCPCS

## 2019-01-07 PROCEDURE — 665001 SOC-HOME HEALTH

## 2019-01-07 PROCEDURE — 665999 HH PPS REVENUE DEBIT

## 2019-01-07 PROCEDURE — 665998 HH PPS REVENUE CREDIT

## 2019-01-07 SDOH — ECONOMIC STABILITY: HOUSING INSECURITY: UNSAFE APPLIANCES: 0

## 2019-01-07 SDOH — ECONOMIC STABILITY: HOUSING INSECURITY: HOME SAFETY: PT HAS MULTIPLE RUGS, NO GRAB BARS IN SHOWER THAT CREATES A POTENTIAL RISK AS A TRIP/SLIP HAZARD.

## 2019-01-07 SDOH — ECONOMIC STABILITY: HOUSING INSECURITY: UNSAFE COOKING RANGE AREA: 0

## 2019-01-07 ASSESSMENT — ENCOUNTER SYMPTOMS
VOMITING: DENIES
DIFFICULTY THINKING: 1
NAUSEA: DENIES
POOR JUDGMENT: 1

## 2019-01-07 ASSESSMENT — PATIENT HEALTH QUESTIONNAIRE - PHQ9
2. FEELING DOWN, DEPRESSED, IRRITABLE, OR HOPELESS: 00
1. LITTLE INTEREST OR PLEASURE IN DOING THINGS: 00

## 2019-01-07 ASSESSMENT — ACTIVITIES OF DAILY LIVING (ADL): OASIS_M1830: 03

## 2019-01-08 ENCOUNTER — TELEPHONE (OUTPATIENT)
Dept: HEALTH INFORMATION MANAGEMENT | Facility: OTHER | Age: 84
End: 2019-01-08

## 2019-01-08 PROCEDURE — 665998 HH PPS REVENUE CREDIT

## 2019-01-08 PROCEDURE — 665999 HH PPS REVENUE DEBIT

## 2019-01-08 NOTE — TELEPHONE ENCOUNTER
Received referral from [facility]. Medications reviewed. No clinically significant interactions noted.     Susana Orta, PharmD

## 2019-01-09 ENCOUNTER — HOME CARE VISIT (OUTPATIENT)
Dept: HOME HEALTH SERVICES | Facility: HOME HEALTHCARE | Age: 84
End: 2019-01-09
Payer: MEDICARE

## 2019-01-09 PROCEDURE — G0153 HHCP-SVS OF S/L PATH,EA 15MN: HCPCS

## 2019-01-09 PROCEDURE — 665999 HH PPS REVENUE DEBIT

## 2019-01-09 PROCEDURE — G0152 HHCP-SERV OF OT,EA 15 MIN: HCPCS

## 2019-01-09 PROCEDURE — 665998 HH PPS REVENUE CREDIT

## 2019-01-10 ENCOUNTER — HOME CARE VISIT (OUTPATIENT)
Dept: HOME HEALTH SERVICES | Facility: HOME HEALTHCARE | Age: 84
End: 2019-01-10
Payer: MEDICARE

## 2019-01-10 VITALS
TEMPERATURE: 98.3 F | RESPIRATION RATE: 18 BRPM | DIASTOLIC BLOOD PRESSURE: 64 MMHG | OXYGEN SATURATION: 92 % | HEART RATE: 63 BPM | SYSTOLIC BLOOD PRESSURE: 132 MMHG

## 2019-01-10 VITALS
HEART RATE: 68 BPM | RESPIRATION RATE: 18 BRPM | DIASTOLIC BLOOD PRESSURE: 60 MMHG | TEMPERATURE: 98.7 F | SYSTOLIC BLOOD PRESSURE: 122 MMHG | OXYGEN SATURATION: 97 %

## 2019-01-10 VITALS
DIASTOLIC BLOOD PRESSURE: 80 MMHG | HEART RATE: 65 BPM | OXYGEN SATURATION: 95 % | TEMPERATURE: 98.1 F | RESPIRATION RATE: 18 BRPM | SYSTOLIC BLOOD PRESSURE: 122 MMHG

## 2019-01-10 VITALS
RESPIRATION RATE: 18 BRPM | DIASTOLIC BLOOD PRESSURE: 63 MMHG | TEMPERATURE: 99.6 F | HEART RATE: 57 BPM | SYSTOLIC BLOOD PRESSURE: 131 MMHG | OXYGEN SATURATION: 92 %

## 2019-01-10 PROCEDURE — 665998 HH PPS REVENUE CREDIT

## 2019-01-10 PROCEDURE — G0495 RN CARE TRAIN/EDU IN HH: HCPCS

## 2019-01-10 PROCEDURE — 665999 HH PPS REVENUE DEBIT

## 2019-01-10 PROCEDURE — G0151 HHCP-SERV OF PT,EA 15 MIN: HCPCS

## 2019-01-10 SDOH — ECONOMIC STABILITY: HOUSING INSECURITY: HOME SAFETY: SPOUSE WILL HAVE GRAB BARS INSTALLED IN THE SHOWER AND SHOWER CHAIR AS RECOMMENDED

## 2019-01-10 SDOH — ECONOMIC STABILITY: HOUSING INSECURITY: UNSAFE COOKING RANGE AREA: 0

## 2019-01-10 SDOH — ECONOMIC STABILITY: HOUSING INSECURITY: UNSAFE APPLIANCES: 0

## 2019-01-10 ASSESSMENT — ACTIVITIES OF DAILY LIVING (ADL)
DRESSING_UB_ASSISTANCE: 0
TRANSPORTATION_ASSISTANCE: 6
BATHING_ASSISTANCE: 4
HOUSEKEEPING_ASSISTANCE: 6
SHOPPING_ASSISTANCE: 6
LAUNDRY_ASSISTANCE: 6
DRESSING_LB_ASSISTANCE: 0
EATING_ASSISTANCE: 0
TOILETING_ASSISTANCE: 0
GROOMING_ASSISTANCE: 0
ORAL_CARE_ASSISTANCE: 0
MEAL_PREP_ASSISTANCE: 6
TELEPHONE_ASSISTANCE: 0

## 2019-01-10 ASSESSMENT — ENCOUNTER SYMPTOMS
POOR JUDGMENT: 1
POOR JUDGMENT: 1

## 2019-01-11 PROCEDURE — 665998 HH PPS REVENUE CREDIT

## 2019-01-11 PROCEDURE — 665999 HH PPS REVENUE DEBIT

## 2019-01-12 PROCEDURE — 665998 HH PPS REVENUE CREDIT

## 2019-01-12 PROCEDURE — 665999 HH PPS REVENUE DEBIT

## 2019-01-13 PROCEDURE — 665999 HH PPS REVENUE DEBIT

## 2019-01-13 PROCEDURE — 665998 HH PPS REVENUE CREDIT

## 2019-01-14 PROCEDURE — 665999 HH PPS REVENUE DEBIT

## 2019-01-14 PROCEDURE — 665998 HH PPS REVENUE CREDIT

## 2019-01-15 ENCOUNTER — HOME CARE VISIT (OUTPATIENT)
Dept: HOME HEALTH SERVICES | Facility: HOME HEALTHCARE | Age: 84
End: 2019-01-15
Payer: MEDICARE

## 2019-01-15 VITALS
OXYGEN SATURATION: 95 % | DIASTOLIC BLOOD PRESSURE: 72 MMHG | TEMPERATURE: 97.8 F | RESPIRATION RATE: 18 BRPM | HEART RATE: 68 BPM | SYSTOLIC BLOOD PRESSURE: 120 MMHG

## 2019-01-15 PROCEDURE — 665999 HH PPS REVENUE DEBIT

## 2019-01-15 PROCEDURE — G0152 HHCP-SERV OF OT,EA 15 MIN: HCPCS

## 2019-01-15 PROCEDURE — G0151 HHCP-SERV OF PT,EA 15 MIN: HCPCS

## 2019-01-15 PROCEDURE — G0493 RN CARE EA 15 MIN HH/HOSPICE: HCPCS

## 2019-01-15 PROCEDURE — 665998 HH PPS REVENUE CREDIT

## 2019-01-15 ASSESSMENT — ENCOUNTER SYMPTOMS: POOR JUDGMENT: 1

## 2019-01-16 ENCOUNTER — HOME CARE VISIT (OUTPATIENT)
Dept: HOME HEALTH SERVICES | Facility: HOME HEALTHCARE | Age: 84
End: 2019-01-16
Payer: MEDICARE

## 2019-01-16 VITALS
TEMPERATURE: 99.7 F | HEART RATE: 73 BPM | SYSTOLIC BLOOD PRESSURE: 108 MMHG | OXYGEN SATURATION: 95 % | RESPIRATION RATE: 16 BRPM | DIASTOLIC BLOOD PRESSURE: 62 MMHG

## 2019-01-16 VITALS
TEMPERATURE: 98.3 F | RESPIRATION RATE: 16 BRPM | HEART RATE: 16 BPM | OXYGEN SATURATION: 95 % | DIASTOLIC BLOOD PRESSURE: 75 MMHG | SYSTOLIC BLOOD PRESSURE: 122 MMHG

## 2019-01-16 PROCEDURE — 665999 HH PPS REVENUE DEBIT

## 2019-01-16 PROCEDURE — 665998 HH PPS REVENUE CREDIT

## 2019-01-16 PROCEDURE — G0153 HHCP-SVS OF S/L PATH,EA 15MN: HCPCS

## 2019-01-16 ASSESSMENT — ENCOUNTER SYMPTOMS: POOR JUDGMENT: 1

## 2019-01-17 ENCOUNTER — HOME CARE VISIT (OUTPATIENT)
Dept: HOME HEALTH SERVICES | Facility: HOME HEALTHCARE | Age: 84
End: 2019-01-17
Payer: MEDICARE

## 2019-01-17 VITALS
TEMPERATURE: 97.7 F | SYSTOLIC BLOOD PRESSURE: 120 MMHG | DIASTOLIC BLOOD PRESSURE: 60 MMHG | OXYGEN SATURATION: 96 % | RESPIRATION RATE: 16 BRPM | HEART RATE: 72 BPM

## 2019-01-17 VITALS
OXYGEN SATURATION: 95 % | DIASTOLIC BLOOD PRESSURE: 68 MMHG | HEART RATE: 61 BPM | SYSTOLIC BLOOD PRESSURE: 122 MMHG | TEMPERATURE: 98.3 F | RESPIRATION RATE: 16 BRPM

## 2019-01-17 VITALS
TEMPERATURE: 97.8 F | HEART RATE: 71 BPM | DIASTOLIC BLOOD PRESSURE: 70 MMHG | OXYGEN SATURATION: 97 % | SYSTOLIC BLOOD PRESSURE: 122 MMHG | RESPIRATION RATE: 18 BRPM

## 2019-01-17 PROCEDURE — G0151 HHCP-SERV OF PT,EA 15 MIN: HCPCS

## 2019-01-17 PROCEDURE — 665998 HH PPS REVENUE CREDIT

## 2019-01-17 PROCEDURE — G0299 HHS/HOSPICE OF RN EA 15 MIN: HCPCS

## 2019-01-17 PROCEDURE — 665999 HH PPS REVENUE DEBIT

## 2019-01-17 ASSESSMENT — ENCOUNTER SYMPTOMS
DIFFICULTY THINKING: 1
VOMITING: NO
NAUSEA: NO
POOR JUDGMENT: 1

## 2019-01-18 ENCOUNTER — HOME CARE VISIT (OUTPATIENT)
Dept: HOME HEALTH SERVICES | Facility: HOME HEALTHCARE | Age: 84
End: 2019-01-18
Payer: MEDICARE

## 2019-01-18 PROCEDURE — 665998 HH PPS REVENUE CREDIT

## 2019-01-18 PROCEDURE — 665999 HH PPS REVENUE DEBIT

## 2019-01-18 PROCEDURE — G0153 HHCP-SVS OF S/L PATH,EA 15MN: HCPCS

## 2019-01-19 PROCEDURE — 665999 HH PPS REVENUE DEBIT

## 2019-01-19 PROCEDURE — 665998 HH PPS REVENUE CREDIT

## 2019-01-20 VITALS
DIASTOLIC BLOOD PRESSURE: 68 MMHG | TEMPERATURE: 98.4 F | HEART RATE: 76 BPM | OXYGEN SATURATION: 96 % | RESPIRATION RATE: 16 BRPM | SYSTOLIC BLOOD PRESSURE: 120 MMHG

## 2019-01-20 PROCEDURE — 665998 HH PPS REVENUE CREDIT

## 2019-01-20 PROCEDURE — 665999 HH PPS REVENUE DEBIT

## 2019-01-21 ENCOUNTER — HOME CARE VISIT (OUTPATIENT)
Dept: HOME HEALTH SERVICES | Facility: HOME HEALTHCARE | Age: 84
End: 2019-01-21
Payer: MEDICARE

## 2019-01-21 PROCEDURE — G0152 HHCP-SERV OF OT,EA 15 MIN: HCPCS

## 2019-01-21 PROCEDURE — 665999 HH PPS REVENUE DEBIT

## 2019-01-21 PROCEDURE — 665998 HH PPS REVENUE CREDIT

## 2019-01-22 ENCOUNTER — HOME CARE VISIT (OUTPATIENT)
Dept: HOME HEALTH SERVICES | Facility: HOME HEALTHCARE | Age: 84
End: 2019-01-22
Payer: MEDICARE

## 2019-01-22 VITALS
TEMPERATURE: 99.2 F | SYSTOLIC BLOOD PRESSURE: 111 MMHG | OXYGEN SATURATION: 96 % | RESPIRATION RATE: 16 BRPM | HEART RATE: 71 BPM | DIASTOLIC BLOOD PRESSURE: 62 MMHG

## 2019-01-22 VITALS
DIASTOLIC BLOOD PRESSURE: 70 MMHG | RESPIRATION RATE: 18 BRPM | SYSTOLIC BLOOD PRESSURE: 130 MMHG | TEMPERATURE: 98.2 F | HEART RATE: 69 BPM | OXYGEN SATURATION: 97 %

## 2019-01-22 PROCEDURE — G0153 HHCP-SVS OF S/L PATH,EA 15MN: HCPCS

## 2019-01-22 PROCEDURE — 665999 HH PPS REVENUE DEBIT

## 2019-01-22 PROCEDURE — G0151 HHCP-SERV OF PT,EA 15 MIN: HCPCS

## 2019-01-22 PROCEDURE — 665998 HH PPS REVENUE CREDIT

## 2019-01-22 ASSESSMENT — ENCOUNTER SYMPTOMS
DIFFICULTY THINKING: 1
POOR JUDGMENT: 1
POOR JUDGMENT: 1

## 2019-01-23 ENCOUNTER — HOME CARE VISIT (OUTPATIENT)
Dept: HOME HEALTH SERVICES | Facility: HOME HEALTHCARE | Age: 84
End: 2019-01-23
Payer: MEDICARE

## 2019-01-23 VITALS
HEART RATE: 62 BPM | OXYGEN SATURATION: 95 % | DIASTOLIC BLOOD PRESSURE: 60 MMHG | RESPIRATION RATE: 16 BRPM | TEMPERATURE: 97.1 F | SYSTOLIC BLOOD PRESSURE: 120 MMHG

## 2019-01-23 VITALS
TEMPERATURE: 98.7 F | SYSTOLIC BLOOD PRESSURE: 122 MMHG | OXYGEN SATURATION: 97 % | HEART RATE: 71 BPM | RESPIRATION RATE: 18 BRPM | DIASTOLIC BLOOD PRESSURE: 66 MMHG

## 2019-01-23 PROCEDURE — 665999 HH PPS REVENUE DEBIT

## 2019-01-23 PROCEDURE — G0493 RN CARE EA 15 MIN HH/HOSPICE: HCPCS

## 2019-01-23 PROCEDURE — 665998 HH PPS REVENUE CREDIT

## 2019-01-23 ASSESSMENT — ENCOUNTER SYMPTOMS: POOR JUDGMENT: 1

## 2019-01-24 ENCOUNTER — HOME CARE VISIT (OUTPATIENT)
Dept: HOME HEALTH SERVICES | Facility: HOME HEALTHCARE | Age: 84
End: 2019-01-24
Payer: MEDICARE

## 2019-01-24 VITALS
OXYGEN SATURATION: 96 % | DIASTOLIC BLOOD PRESSURE: 60 MMHG | HEART RATE: 64 BPM | TEMPERATURE: 97.8 F | RESPIRATION RATE: 16 BRPM | SYSTOLIC BLOOD PRESSURE: 110 MMHG

## 2019-01-24 VITALS
DIASTOLIC BLOOD PRESSURE: 88 MMHG | SYSTOLIC BLOOD PRESSURE: 148 MMHG | HEART RATE: 63 BPM | TEMPERATURE: 98.4 F | OXYGEN SATURATION: 94 % | RESPIRATION RATE: 16 BRPM

## 2019-01-24 PROCEDURE — 665998 HH PPS REVENUE CREDIT

## 2019-01-24 PROCEDURE — 665999 HH PPS REVENUE DEBIT

## 2019-01-24 PROCEDURE — G0151 HHCP-SERV OF PT,EA 15 MIN: HCPCS

## 2019-01-24 PROCEDURE — G0153 HHCP-SVS OF S/L PATH,EA 15MN: HCPCS

## 2019-01-24 ASSESSMENT — ENCOUNTER SYMPTOMS
DIFFICULTY THINKING: 1
POOR JUDGMENT: 1

## 2019-01-25 PROCEDURE — 665999 HH PPS REVENUE DEBIT

## 2019-01-25 PROCEDURE — 665998 HH PPS REVENUE CREDIT

## 2019-01-26 PROCEDURE — 665998 HH PPS REVENUE CREDIT

## 2019-01-26 PROCEDURE — 665999 HH PPS REVENUE DEBIT

## 2019-01-27 PROCEDURE — 665998 HH PPS REVENUE CREDIT

## 2019-01-27 PROCEDURE — 665999 HH PPS REVENUE DEBIT

## 2019-01-28 PROCEDURE — 665999 HH PPS REVENUE DEBIT

## 2019-01-28 PROCEDURE — 665998 HH PPS REVENUE CREDIT

## 2019-01-29 ENCOUNTER — HOME CARE VISIT (OUTPATIENT)
Dept: HOME HEALTH SERVICES | Facility: HOME HEALTHCARE | Age: 84
End: 2019-01-29
Payer: MEDICARE

## 2019-01-29 PROCEDURE — G0153 HHCP-SVS OF S/L PATH,EA 15MN: HCPCS

## 2019-01-29 PROCEDURE — 665999 HH PPS REVENUE DEBIT

## 2019-01-29 PROCEDURE — G0151 HHCP-SERV OF PT,EA 15 MIN: HCPCS

## 2019-01-29 PROCEDURE — 665998 HH PPS REVENUE CREDIT

## 2019-01-30 ENCOUNTER — HOME CARE VISIT (OUTPATIENT)
Dept: HOME HEALTH SERVICES | Facility: HOME HEALTHCARE | Age: 84
End: 2019-01-30
Payer: MEDICARE

## 2019-01-30 VITALS
DIASTOLIC BLOOD PRESSURE: 70 MMHG | TEMPERATURE: 97.6 F | SYSTOLIC BLOOD PRESSURE: 116 MMHG | HEART RATE: 62 BPM | RESPIRATION RATE: 16 BRPM | OXYGEN SATURATION: 95 %

## 2019-01-30 VITALS
OXYGEN SATURATION: 97 % | DIASTOLIC BLOOD PRESSURE: 70 MMHG | HEART RATE: 64 BPM | RESPIRATION RATE: 16 BRPM | SYSTOLIC BLOOD PRESSURE: 118 MMHG | TEMPERATURE: 97.8 F

## 2019-01-30 PROCEDURE — G0495 RN CARE TRAIN/EDU IN HH: HCPCS

## 2019-01-30 PROCEDURE — 665998 HH PPS REVENUE CREDIT

## 2019-01-30 PROCEDURE — 665999 HH PPS REVENUE DEBIT

## 2019-01-30 ASSESSMENT — ENCOUNTER SYMPTOMS
POOR JUDGMENT: 1
DIFFICULTY THINKING: 1

## 2019-01-31 ENCOUNTER — HOME CARE VISIT (OUTPATIENT)
Dept: HOME HEALTH SERVICES | Facility: HOME HEALTHCARE | Age: 84
End: 2019-01-31
Payer: MEDICARE

## 2019-01-31 VITALS
SYSTOLIC BLOOD PRESSURE: 110 MMHG | TEMPERATURE: 98.1 F | HEART RATE: 64 BPM | RESPIRATION RATE: 16 BRPM | OXYGEN SATURATION: 95 % | DIASTOLIC BLOOD PRESSURE: 60 MMHG

## 2019-01-31 PROCEDURE — 665998 HH PPS REVENUE CREDIT

## 2019-01-31 PROCEDURE — G0151 HHCP-SERV OF PT,EA 15 MIN: HCPCS

## 2019-01-31 PROCEDURE — G0153 HHCP-SVS OF S/L PATH,EA 15MN: HCPCS

## 2019-01-31 PROCEDURE — 665999 HH PPS REVENUE DEBIT

## 2019-01-31 ASSESSMENT — ENCOUNTER SYMPTOMS: POOR JUDGMENT: 1

## 2019-02-01 ENCOUNTER — HOME CARE VISIT (OUTPATIENT)
Dept: HOME HEALTH SERVICES | Facility: HOME HEALTHCARE | Age: 84
End: 2019-02-01
Payer: MEDICARE

## 2019-02-01 VITALS
OXYGEN SATURATION: 98 % | TEMPERATURE: 98.1 F | RESPIRATION RATE: 16 BRPM | DIASTOLIC BLOOD PRESSURE: 68 MMHG | HEART RATE: 68 BPM | SYSTOLIC BLOOD PRESSURE: 124 MMHG

## 2019-02-01 PROCEDURE — 665998 HH PPS REVENUE CREDIT

## 2019-02-01 PROCEDURE — 665999 HH PPS REVENUE DEBIT

## 2019-02-02 PROCEDURE — 665999 HH PPS REVENUE DEBIT

## 2019-02-02 PROCEDURE — 665998 HH PPS REVENUE CREDIT

## 2019-02-03 PROCEDURE — 665999 HH PPS REVENUE DEBIT

## 2019-02-03 PROCEDURE — 665998 HH PPS REVENUE CREDIT

## 2019-02-04 PROCEDURE — 665999 HH PPS REVENUE DEBIT

## 2019-02-04 PROCEDURE — 665998 HH PPS REVENUE CREDIT

## 2019-02-05 ENCOUNTER — HOME CARE VISIT (OUTPATIENT)
Dept: HOME HEALTH SERVICES | Facility: HOME HEALTHCARE | Age: 84
End: 2019-02-05
Payer: MEDICARE

## 2019-02-05 PROCEDURE — 665999 HH PPS REVENUE DEBIT

## 2019-02-05 PROCEDURE — G0153 HHCP-SVS OF S/L PATH,EA 15MN: HCPCS

## 2019-02-05 PROCEDURE — 665998 HH PPS REVENUE CREDIT

## 2019-02-06 PROCEDURE — 665999 HH PPS REVENUE DEBIT

## 2019-02-06 PROCEDURE — 665998 HH PPS REVENUE CREDIT

## 2019-02-07 ENCOUNTER — HOME CARE VISIT (OUTPATIENT)
Dept: HOME HEALTH SERVICES | Facility: HOME HEALTHCARE | Age: 84
End: 2019-02-07
Payer: MEDICARE

## 2019-02-07 PROCEDURE — G0153 HHCP-SVS OF S/L PATH,EA 15MN: HCPCS

## 2019-02-07 PROCEDURE — 665999 HH PPS REVENUE DEBIT

## 2019-02-07 PROCEDURE — 665998 HH PPS REVENUE CREDIT

## 2019-02-08 ENCOUNTER — HOME CARE VISIT (OUTPATIENT)
Dept: HOME HEALTH SERVICES | Facility: HOME HEALTHCARE | Age: 84
End: 2019-02-08
Payer: MEDICARE

## 2019-02-08 VITALS
TEMPERATURE: 97.7 F | SYSTOLIC BLOOD PRESSURE: 120 MMHG | DIASTOLIC BLOOD PRESSURE: 60 MMHG | RESPIRATION RATE: 16 BRPM | HEART RATE: 60 BPM | OXYGEN SATURATION: 97 %

## 2019-02-08 PROCEDURE — 665998 HH PPS REVENUE CREDIT

## 2019-02-08 PROCEDURE — G0493 RN CARE EA 15 MIN HH/HOSPICE: HCPCS

## 2019-02-08 PROCEDURE — 665999 HH PPS REVENUE DEBIT

## 2019-02-08 ASSESSMENT — ACTIVITIES OF DAILY LIVING (ADL)
HOME_HEALTH_OASIS: 00
OASIS_M1830: 01

## 2019-02-08 ASSESSMENT — PATIENT HEALTH QUESTIONNAIRE - PHQ9
SUM OF ALL RESPONSES TO PHQ QUESTIONS 1-9: 17
CLINICAL INTERPRETATION OF PHQ2 SCORE: 5
5. POOR APPETITE OR OVEREATING: 0 - NOT AT ALL

## 2019-02-08 ASSESSMENT — ENCOUNTER SYMPTOMS
DIFFICULTY THINKING: 1
POOR JUDGMENT: 1

## 2019-02-09 PROCEDURE — 665999 HH PPS REVENUE DEBIT

## 2019-02-09 PROCEDURE — 665998 HH PPS REVENUE CREDIT

## 2019-02-10 PROCEDURE — 665998 HH PPS REVENUE CREDIT

## 2019-02-10 PROCEDURE — 665999 HH PPS REVENUE DEBIT

## 2019-02-11 ENCOUNTER — PHYSICAL THERAPY (OUTPATIENT)
Dept: PHYSICAL THERAPY | Facility: REHABILITATION | Age: 84
End: 2019-02-11
Attending: FAMILY MEDICINE
Payer: MEDICARE

## 2019-02-11 DIAGNOSIS — R26.89 OTHER ABNORMALITIES OF GAIT AND MOBILITY: ICD-10-CM

## 2019-02-11 PROCEDURE — 665998 HH PPS REVENUE CREDIT

## 2019-02-11 PROCEDURE — 97162 PT EVAL MOD COMPLEX 30 MIN: CPT

## 2019-02-11 PROCEDURE — 665999 HH PPS REVENUE DEBIT

## 2019-02-11 ASSESSMENT — GAIT ASSESSMENTS
STEPS: NORMAL: ALTERNATING FEET, NO RAIL
GAIT WITH VERTICAL HEAD TURNS: NORMAL: PERFORMS HEAD TURNS SMOOTHLY WITH NO CHANGE IN GAIT
GAIT AND PIVOT TURN: NORMAL: PIVOT TURNS SAFELY WITHIN 3 SECONDS AND STOPS QUICKLY WITH NO LOSS OF BALANCE
GAIT LEVEL SURFACE: NORMAL: WALKS 20', NO ASSISTIVE DEVICES, GOOD SPEED, NO EVIDENCE FOR IMBALANCE, NORMAL GAIT PATTERN
CHANGE IN GAIT SPEED: NORMAL: ABLE TO SMOOTHLY CHANGE WALKING SPEED WITHOUT LOSS OF BALANCE OR GAIT DEVIATION. SHOWS A SIGNIFICANT DIFFERENCE IN WALKING SPEEDS BETWEEN NORMAL, FAST AND SLOW SPEEDS
DYNAMIC GAIT INDEX SCORE: 100
GAIT WITH HORIZONTAL HEAD TURNS: NORMAL: PERFORMS HEAD TURNS SMOOTHLY WITH NO CHANGE IN GAIT
STEP OVER OBSTACLE: NORMAL: IS ABLE TO STEP OVER THE BOX WITHOUT CHANGING GAIT SPEED, NO EVIDENCE OF IMBALANCE
STEP AROUND OBSTACLES: NORMAL: IS ABLE TO WALK AROUND CONES SAFELY WITHOUT CHANGING GAIT SPEED, NO EVIDENCE OF IMBALANCE

## 2019-02-11 ASSESSMENT — ENCOUNTER SYMPTOMS
PAIN SCALE AT HIGHEST: 0
PAIN SCALE AT LOWEST: 0
PAIN SCALE: 0

## 2019-02-11 NOTE — OP THERAPY EVALUATION
Outpatient Physical Therapy  INITIAL NEUROLOGICAL EVALUATION    [facility] Physical Therapy [address]  [address]  [place] [address]  Phone:  [phone]  Fax:  [phone]    Date of Evaluation: 2019    Patient: Elias Vincent  YOB: 1935  MRN: [id number]     Referring Provider: Finn Cummins D.O.  [address] [place] [address]   Referring Diagnosis Other abnormalities of gait and mobility [R26.89]     Time Calculation  Start time: 1405  Stop time: 1500 Time Calculation (min): 55 minutes     Physical Therapy Occurrence Codes    Date of onset of impairment:  19   Date physical therapy care plan established or reviewed:  19   Date physical therapy treatment started:  19          Chief Complaint: No chief complaint on file.    Visit Diagnoses     ICD-10-CM   1. Other abnormalities of gait and mobility R26.89       Subjective:   History of Present Illness:     Mechanism of injury:  Patient is a 83 year old male who was admitted to [facility] on 2018 secondary to severe HA and memory loss. CT revealed intracranial hemorrhage. Patient stayed in hospital for approx. 4 days and d/c home with wife. He has continued home health services until recently.     Patient reports that largely his issues are cognitive. Physically patient reports he is feeling good, but wife reports initially there was a lot of weakness.     Patient lives in single story home, with 1 stair into house and 1 step down into living room.  Sleep disturbance: patient sleeps until 4-5am, then wife reports patient tells her he hasn't slept all night   Pain:     Current pain ratin    At best pain ratin    At worst pain ratin  Patient Goals:     Other patient goals:  To get evaluated for potential PT treatment.       History reviewed. No pertinent past medical history.  Past Surgical History:   Procedure Laterality Date   • APPENDECTOMY     • CHOLECYSTECTOMY       Social History    Substance Use Topics   • Smoking status: Never Smoker   • Smokeless tobacco: Not on file   • Alcohol use No     Family and Occupational History     Social History   • Marital status:      Spouse name: N/A   • Number of children: N/A   • Years of education: N/A       Objective:     Strength:     Left upper extremity strength within functional limits.      Right upper extremity strength within functional limits.      Left lower extremity strength within functional limits.      Right lower extremity strength within functional limits.    , Prehension, Pinch:  /Prehension (left):     Strength Left : 80, 80, 80 lbs   /Prehension (right):     Strength Right : 90, 80, 80 lbs     Tone, Sensation and Coordination:   Tone:     Left upper extremity tone: noted hand tremoring with exertion     Right upper extremity tone: noted hand tremoring with exertion.    Left lower extremity muscle tone: Normal    Right lower extremity muscle tone: Normal    Sensation   Lower extremity (left):     Light touch: Intact  Lower extremity (right):     Light touch: Intact    Coordination   Lower extremity (left):     Slow alternating movements: Within functional limits    Rapid alternating movements: Within functional limits    Toe tapping: Within functional limits    Heel to shin: Within functional limits  Lower extremity (right):     Slow alternating movements: Within functional limits    Rapid alternating movements: Within functional limits    Toe tapping: Within functional limits    Heel to shin: Within functional limits    Cognition:     Short term memory: impaired    Long term memory: impaired    Organization: impaired    Judgement and safety awareness: impaired    Cognition Comments:  Recommend continued ST services.     Ambulation: Level Surfaces   Ambulation without assistive device: independent    Ambulation: Stairs   Ascend stairs: independent  Pattern: reciprocal  Railings: without rails  Descend stairs:  independent  Pattern: reciprocal  Railings: without rails    Observational Gait   Walking speed, stride length and stride width within functional limits.   Left arm swing: within functional limits  Right arm swing: within functional limits  Base of support: normal    Quality of Movement During Gait   Trunk   Trunk within functional limits.     Pelvis   Pelvis within functional limits.     Additional Quality of Movement During Gait Details  DGI: 24/24         Therapeutic Exercises (CPT 28464):     1. HEP, tandem balance at counter      Time-based treatments/modalities:          Assessment, Response and Plan:   Assessment details:  Mr. Vincent is a 83 year old male who presents to OP PT secondary to ICH on 12/31/2018. After evaluation, patient demonstrates baseline for functional mobility per family and demonstrated 24/24 score on DGI. At this time, recommending no further OP PT services. Patient is ambulating independently without AD. Discussed with patient and wife for patient safety with new tasks and education on cognition impacting mobility.  Patient did demonstrate impaired cognition and will be evaluated by ST on 02/15/2019.      If sxs change and PT is needed, patient will need additional referral.   Goals:   Short Term Goals:   No goals, evaluation only       Long Term Goals:    No goals, evaluation only     Plan:   Therapy options:  No further treatment needed      Functional Limitations and Severity Modifiers  Dynamic Gait Index Total Score: 100  WOMAC Grand Total: 0       Referring provider co-signature:  I have reviewed this plan of care and my co-signature certifies the need for services.  Certification Dates:   From 2/11/2019      Physician Signature: ________________________________ Date: ______________

## 2019-02-12 PROCEDURE — 665998 HH PPS REVENUE CREDIT

## 2019-02-12 PROCEDURE — 665999 HH PPS REVENUE DEBIT

## 2019-02-12 ASSESSMENT — ACTIVITIES OF DAILY LIVING (ADL): AMBULATION_WITHOUT_ASSISTIVE_DEVICE: INDEPENDENT

## 2019-02-13 ENCOUNTER — APPOINTMENT (OUTPATIENT)
Dept: PHYSICAL THERAPY | Facility: REHABILITATION | Age: 84
End: 2019-02-13
Attending: FAMILY MEDICINE
Payer: MEDICARE

## 2019-02-15 ENCOUNTER — SPEECH THERAPY (OUTPATIENT)
Dept: SPEECH THERAPY | Facility: REHABILITATION | Age: 84
End: 2019-02-15
Attending: FAMILY MEDICINE
Payer: MEDICARE

## 2019-02-15 ENCOUNTER — APPOINTMENT (OUTPATIENT)
Dept: PHYSICAL THERAPY | Facility: REHABILITATION | Age: 84
End: 2019-02-15
Attending: FAMILY MEDICINE
Payer: MEDICARE

## 2019-02-15 DIAGNOSIS — R47.89 WORD FINDING DIFFICULTY: ICD-10-CM

## 2019-02-15 DIAGNOSIS — I61.4 NONTRAUMATIC INTRACEREBRAL HEMORRHAGE OF CEREBELLUM, UNSPECIFIED LATERALITY (HCC): ICD-10-CM

## 2019-02-15 DIAGNOSIS — I69.111 MEMORY DEFICIT FOLLOWING NONTRAUMATIC INTRACEREBRAL HEMORRHAGE: ICD-10-CM

## 2019-02-15 PROCEDURE — 92523 SPEECH SOUND LANG COMPREHEN: CPT

## 2019-02-15 ASSESSMENT — ENCOUNTER SYMPTOMS: NO PATIENT REPORTED PAIN: 1

## 2019-02-15 NOTE — OP THERAPY EVALUATION
Outpatient Speech Therapy  INITIAL EVALUATION    [facility] Speech [address]  [address]  [place] [address]  Phone:  [phone]  Fax:  [phone]    Date of Evaluation: 02/15/2019    Patient: Elias Vincent  YOB: 1935  MRN: [id number]     Referring Provider: Finn Cummins D.O.  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 Nontraumatic intracerebral hemorrhage of cerebellum, unspecified laterality (HCC) I61.4   2. 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 Word finding difficulty R47.89     Subjective:   Reason for Therapy:     Reason For Evaluation:  Cognition and Speech/Language    Onset Date:  12/31/2018    Onset Description:  Patient 83 year old male referred to speech therapy following participation in home health therapy service s/p Intracranial hemorrhage.  Patient and wife report increased concern regarding memory deficits following CVA.   Social Support:     Accompanied By:  Spouse (Wife, Evelin, present and supportive)    Patient Mental Status:  Alert and Responsive  Progress Factors:     Progression:  Getting Better  Pain:     no pain reported    Therapy History:     Previous Treatments and Dates:  Patient participated in home health care PT/OT/ST. Continued participation in outpatient speech therapy addressing cognitive therapy to increase function and consistency for patient safety and independence recommended.     Current Diet:   Regular Diet and Thin Liquids    Hearing:  No Deficits    Vision:  No Deficits    Dentition:  Complete Dentition    Handedness:  Right-handed  Additional Subjective Comments:      Patient/ wife reporting that memory is improving characterized by ability to participate in a simple conversation. Patient noted to engage in frequent repeating during the evaluation to increase understanding.     No past medical history on file.  Past Surgical History:   Procedure Laterality Date   • APPENDECTOMY     • CHOLECYSTECTOMY       Objective:   Treatments/Interventions Performed:  Patient/Caregiver education and Compensatory strategy training  Other Treatment Interventions:  Cognitive Linguistic Quick Test (CLQT)  Treatment Intervention tool(s) used:  The Cognitive Linguistic Quick Test (CLQT) was administered to assess patient cognitive linguistic function in five cognitive domain areas including Attention, Memory, Executive functions, Language and Visuospatial skills.  Patient achieved the following cognitive domain score(s) with corresponding severity rating(s):  Attention 148/ MILD. Memory 130/ MILD. Executive functions 16/ MILD. Language 27/ MILD. Visuospatial skills 65/ WITHIN NORMAL LIMITS.      Cognitive domain scores were combined to reveal an overall composite severity rating.  Patient achieved an overall severity rating of 3.2 which is consistent with MILD.  Clock drawing revealed a score of 8 consistent with MODERATE deficits.   Objective Details:  Results of the CLQT revealed mild deficits in cognitive linguistic function in all areas of attention, memory, executive functions and language.     Speech Therapy Assessment:     Expressive Language Assessment:     Portions of the Other (Cognitive Linguistic Quick Test (CLQT)) are used.    Ability to exhibit appropriate naming: WFL.    Explains function of object WFL.    Repeats speech accurately WFL.    Patient's ability to use automatic language appropriately is WFL.     Patient's ability to verbalize wants and needs is WFL.    Patient's ability to sustain dialogues within a given topic is Minimal.    Agrammatism is Present.    Paraphasic is Present.    Perseveration is Present.    Expressive language comments: Patient would often seek assistance from wife when experiencing difficulties with verbal expression or ask wife to assist in clarification of speech when patient was unable to independently state a thought or idea. Patient with frequent use of non-specific language; however was largely successful in communicating thought or idea with continued re-casting.  Patient aware of changes to independence in verbal expression.     Cognitive Linguistic Assessment:     Portions of the Other (Cognitive Linguistic Quick Test (CLQT)) are used.    Patient attention sustained: WFL    Patient attention selective: Minimal    Patient attention divided: Minimal    Patient orientation to day: WFL    Patient orientation to month: WFL    Patient orientation to time: WFL    Patient orientation to self: WFL    Patient orientation to place: WFL    Patient immediate memory: Minimal    Patient recent and short term memory: Minimal    Patient remote and long term memory: WFL    Patient prospective and time delay memory: Minimal    Patient biographical information memory: WFL    Patient executive functioning ability: Minimal    Patient spontaneous clock drawing ability: Moderate      Speech Therapy Plan :   Prognosis & Recommendations  Impression Summary:  Based on results of today's assessment of cognitive linguistic skills through administration of the Cognitive Linguistic Quick Test (CLQT), patient presented with minimal deficits in the areas of attention, memory, executive functions and language impacting patient independence and safety in completion of a variety of independent language related activities of daily living. Patient would likely benefit from participation in direct, skilled outpatient  "speech therapy services to address areas of deficit to train compensatory strategies and improve function to previous level of independence.  Results of today's assessment including recommendations for POC discussed at length with patient/ wife with all parties agreeable to participation in speech therapy services at this time.   Prognosis:  Excellent  Goals  Short Term Goals:  1.  Patient will improve attention through completion of divided attention tasks completing with 92% accuracy to independent level; or demonstrating less than 2 errors in 5 minutes.  2.  Patient will improve short term/ time delay recall through participation in mental manipulation tasks of 4 progressing to 5 items and/or demonstrating independence in use of compensatory memory strategies (RWAVS; associating items through linking) completing with 88% accuracy independent level.  3.  Patient will improve executive function skills demonstrating independence in a variety of independent language related activities of daily living (e.g. Reading instructions; using a calendar; finance/ medication management) completing with 92% accuracy.     Short Term Goal Duration (Weeks):  4-6 weeks  Long Term Goals:  1.  Patient will improve cognitive linguistic function with WITHIN NORMAL LIMITS as evidenced by performance on standardized assessment measures to increase accuracy and independence in completion of a variety of language related activities of daily living.   Long Term Goal Duration (Weeks):  1-2 months  Patient Stated Goal:  \"To improve memory\"  Therapy Recommendations  Recommendation:  Individual Speech Therapy,  Planned Therapy Interventions:  Patient/Caregiver Education, Compensatory Strategy Training, Home Program and Cognitive-Linguistic training,   Plan Details:  8 units (02062)  Frequency:  1x week  Duration (in weeks):  8            Referring provider co-signature:  I have reviewed this plan of care and my co-signature certifies the need " for services.  Certification Dates:   From 2/15.2019     To 4/12.2019    Physician Signature: ________________________________ Date: ______________

## 2019-02-18 ENCOUNTER — APPOINTMENT (OUTPATIENT)
Dept: PHYSICAL THERAPY | Facility: REHABILITATION | Age: 84
End: 2019-02-18
Attending: FAMILY MEDICINE
Payer: MEDICARE

## 2019-02-18 ENCOUNTER — SPEECH THERAPY (OUTPATIENT)
Dept: SPEECH THERAPY | Facility: REHABILITATION | Age: 84
End: 2019-02-18
Attending: FAMILY MEDICINE
Payer: MEDICARE

## 2019-02-18 DIAGNOSIS — R41.840 COGNITIVE ATTENTION DEFICIT: ICD-10-CM

## 2019-02-18 DIAGNOSIS — I69.111 MEMORY DEFICIT FOLLOWING NONTRAUMATIC INTRACEREBRAL HEMORRHAGE: ICD-10-CM

## 2019-02-18 PROCEDURE — 92507 TX SP LANG VOICE COMM INDIV: CPT

## 2019-02-18 NOTE — OP THERAPY DAILY TREATMENT
Outpatient Speech Therapy  DAILY TREATMENT     [facility] Speech [address]  [address]  [place] [address]  Phone:  [phone]  Fax:  [phone]    Date: 02/18/2019    Patient: Elias Vincent  YOB: 1935  MRN: [id number]     Time Calculation  Start time: 1300  Stop time: 1400 Time Calculation (min): 60 minutes     Chief Complaint: Poor Memory    Visit #: 2    Subjective:   Reason for Therapy:     Reason For Evaluation:  Cognition (Attention/ Memory)  Social Support:     Accompanied By:  Spouse (wife present and supportive)    Patient Mental Status:  Alert and Responsive  Progress Factors:     Progression:  Getting Better  Additional Subjective Comments:      Patient arrived to speech therapy in good spirits and worked well with all therapy tasks.  Improvements in processing time for structured tasks noted.       Objective:   Treatments/Interventions Performed:  Cognitive-Linguistic training, Patient/Caregiver education and Compensatory strategy training         Speech Therapy Assessment:     Cognitive Linguistic Assessment:     Patient attention sustained: WFL (Word search completed to 12/13 = 92.3% accuracy independent)    Patient attention selective: [facility]    Patient attention divided: WFL (100% accuracy)    Patient orientation to day: [facility]    Patient orientation to month: [facility]    Patient orientation to time: [facility]    Patient orientation to self: [facility]    Patient orientation to place: [facility]    Patient spontaneous clock drawing ability: [facility]    Cognitive-Linguistic comments: Patient educated/ trained in association memory strategy related to recall of names to faces.  Using strategy, patient increased accuracy in recall of 3 names to faces:  Recall 3/3 post 2 minute delay; 2/3 (66% accuracy) to 5 minute delay and 2/3 (66% accuracy) to 10 minute delay.  Sustained/ selective attention addressed through structured following directions:  Patient independent in following 2 step directions  of 4 components with 95% accuracy.  Min verbal cues necessary to increase accuracy initially with cues faded to independent.  Divided attention addressed through completion of visual cancellation task: patient completed to 100% accuracy with use of visual cues and checkbacks to increase accuracy.  Marked improvement in attention noted during today's session.         Speech Therapy Plan :   Therapy Recommendations  Recommendation: Individual Speech Therapy,  Planned Therapy Interventions:  Home Program, Patient/Caregiver Education, Compensatory Strategy Training and Cognitive-Linguistic training,   Plan Details:  Continue with POC.  Exercise to complete as part of home program introduced.

## 2019-02-22 ENCOUNTER — OFFICE VISIT (OUTPATIENT)
Dept: NEUROLOGY | Facility: MEDICAL CENTER | Age: 84
End: 2019-02-22
Payer: MEDICARE

## 2019-02-22 ENCOUNTER — APPOINTMENT (OUTPATIENT)
Dept: PHYSICAL THERAPY | Facility: REHABILITATION | Age: 84
End: 2019-02-22
Attending: FAMILY MEDICINE
Payer: MEDICARE

## 2019-02-22 VITALS
BODY MASS INDEX: 23.02 KG/M2 | HEART RATE: 78 BPM | DIASTOLIC BLOOD PRESSURE: 68 MMHG | SYSTOLIC BLOOD PRESSURE: 110 MMHG | OXYGEN SATURATION: 93 % | TEMPERATURE: 97.5 F | WEIGHT: 147 LBS

## 2019-02-22 DIAGNOSIS — I62.9 INTRACRANIAL HEMORRHAGE (HCC): ICD-10-CM

## 2019-02-22 DIAGNOSIS — D69.6 THROMBOCYTOPENIA (HCC): ICD-10-CM

## 2019-02-22 DIAGNOSIS — R56.9 SEIZURE (HCC): ICD-10-CM

## 2019-02-22 DIAGNOSIS — D69.1 THROMBOCYTOPATHIA (HCC): ICD-10-CM

## 2019-02-22 PROCEDURE — 99215 OFFICE O/P EST HI 40 MIN: CPT | Performed by: PSYCHIATRY & NEUROLOGY

## 2019-02-22 NOTE — Clinical Note
Wayne Saba,   Can you please fax this letter and my progress note from 2/22/19 to Dr. Cummins? His fax is [phone]. Thank You, Dr. Story.

## 2019-02-22 NOTE — PROGRESS NOTES
CC: Stroke/TIA      HPI:    Elias Vincent is an 83 y.o. 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 He is being referred by his primary care provider Finn Cummins D.O., and was seen by Dr. Vanessa Hummel while an inpatient at [facility].     To review salient points of his history, Mr. Vincent suffered a left temporal intraparenchymal hemorrhage on 12/31/18 which was discovered after he acutely developed symptoms of headache and confusion. He was seen by my colleague Dr. Vanessa Hummel in the acute setting and who also followed him during his hospitalization. At the time of the stroke, he was not on any blood thinners but was found to have thrombocytopenia (platelet count of 95). His platelet levels remains low throughout the remainder of his hospitalization. According to his wife, this has apparently been a chronic issue for him, monitored over the span of many years by Dr. Cummins, but no underlying cause has ever been identified.     During his hospitalization, he also had a confusional event concerning for seizure. There is no other description of the event, but EEG did not show any epileptiform discharges. He was discharged on Keppra 500mg BID, but after discharge, he developed a severe rash attributed to the Keppra and so Dr. Cummins appropriately discontinued the medication. He had contacted the office and we discussed transitioning him to Depakote prior to today's visit, which he had done. At this point, he has titrated up to 250mg BID and has overall been tolerating the medication. There have been no episodes concerning for further seizure events, but his wife noted that after discharge, he had worsening memory problems and gait imbalance. He and his wife feel his balance worsened since starting the Depakote. He inquires as to whether or not he can return to driving, but I informed him during the course of  "our discussion that it is not safe for him to return to driving.     He recently saw his ophthalmologist, Dr. Pak. His wife tells me that he had \"failed his eye exams\", his left eye was suspicious for glaucoma. Dr. Pak said his vision is very good, though.    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 He had a blood clot in his right arm in the late 1970s. He was [place] in the [place] at that time and had to have surgery to remove the clot. Additionally, he has had several skin cancers removed including a melanoma from his chest and at least two basal cell skin cancers. While his wife notes that the day of his stroke he was very stressed out about one of their [employer] franchises, he has never had issues with hypertension.       ROS:   Constitutional: No fevers or chills.  Eyes: No blurry vision or eye pain.  ENT: No dysphagia or hearing loss.  Respiratory: No cough or shortness of breath.  Cardiovascular: No chest pain or palpitations.  GI: No nausea, vomiting, or diarrhea.  : No urinary incontinence or dysuria.  Musculoskeletal: No joint swelling or arthralgias.  Skin: No skin rashes.  Neuro: No headaches, dizziness, or tremors.  Endocrine: No heat or cold intolerance. No polydipsia or polyuria.  Psych: No depression or anxiety.  Heme/Lymph: No easy bruising or swollen lymph nodes.  All other review of systems were reviewed and were negative.     Past Medical History:   Past Medical History:   Diagnosis Date   • Intracranial hemorrhage (HCC) 12/31/2018   • Basal cell adenocarcinoma    • Blood clot in vein     Left arm - had to have his rib removed.    • Hyperlipidemia    • Melanoma (HCC)    • Peritonitis (HCC)     Ruptured appendix in the 1960s       Past Surgical History:   Past Surgical History:   Procedure Laterality Date   • APPENDECTOMY     • CHOLECYSTECTOMY     • RIB RESECTION  1970s    Removed due to a blood clot in his shoulder       Social History:   Social History     Social History   • Marital status: "      Spouse name: N/A   • Number of children: N/A   • Years of education: N/A     Occupational History   • Not on file.     Social History Main Topics   • Smoking status: Never Smoker   • Smokeless tobacco: Never Used   • Alcohol use No   • Drug use: No   • Sexual activity: Yes     Other Topics Concern   • Not on file     Social History Narrative   • No narrative on file       Family Hx:   Family History   Problem Relation Age of Onset   • Heart Disease Mother    • Cancer Father         Brain   • Cancer Sister    • Heart Disease Brother    • Stroke Sister    • No Known Problems Son    • No Known Problems Son    • No Known Problems Son    • No Known Problems Son    • No Known Problems Daughter    • No Known Problems Daughter    • No Known Problems Daughter    • Dementia Maternal Aunt        Current Medications:   Current Outpatient Prescriptions:   •  divalproex ER (DEPAKOTE ER) 500 MG TABLET SR 24 HR, Take 1,000 mg by mouth every day., Disp: , Rfl:   •  Omega-3 Fatty Acids (OMEGA-3 FISH OIL PO), Take 1 Tab by mouth every day., Disp: , Rfl:   •  Flaxseed, Linseed, (FLAXSEED OIL PO), Take 1 Tab by mouth every day., Disp: , Rfl:   •  multivitamin (THERAGRAN) Tab, Take 1 Tab by mouth every day., Disp: , Rfl:   •  atorvastatin (LIPITOR) 10 MG Tab, Take 10 mg by mouth every day., Disp: , Rfl:   •  LATANOPROST OP, 1 Drop by Ophthalmic route every day at 6 PM., Disp: , Rfl:   •  omeprazole (PRILOSEC) 20 MG CPDR, Take 20 mg by mouth every day., Disp: , Rfl:   •  LORazepam (ATIVAN) 1 MG Tab, Take 1 mg by mouth every 8 hours as needed for Anxiety., Disp: , Rfl:   •  acetaminophen (TYLENOL) 500 MG Tab, Take 500-1,000 mg by mouth every 6 hours as needed for Mild Pain or Moderate Pain., Disp: , Rfl:   •  levETIRAcetam (KEPPRA) 500 MG Tab, Take 1 Tab by mouth 2 Times a Day. (Patient not taking: Reported on 2/8/2019), Disp: 28 Tab, Rfl: 0    Allergies:   Allergies   Allergen Reactions   • Levetiracetam Rash     itchy rash  all over body   • Oxcarbazepine Anxiety         Physical Exam:   Ambulatory Vitals  Encounter Vitals  Temperature: 36.4 °C (97.5 °F)  Temp src: Temporal  Blood Pressure : 110/68  Pulse: 78  Pulse Oximetry: 93 %  Weight: 66.7 kg (147 lb)    Constitutional: Well-developed, well-nourished, good hygiene. Appears stated age.  Cardiovascular: RRR, with no murmurs, rubs or gallops. No carotid bruits. No peripheral edema, pedal pulses intact.   Respiratory: Lungs CTA B/L, no W/R/R.   Skin: Warm, dry, intact. No rashes observed.  Eyes: Eyes conjugate. Sclera anicteric.   Neurologic:   Mental Status: Awake, alert.   Speech: Fluent with normal prosody.   Memory: Difficulty remembering date and details of his history.    Concentration: Attentive. Able to focus on history and follow multi-step commands.   Fund of Knowledge: Appropriate.   Cranial Nerves:    CN II: PERRL. No afferent pupillary defect.    CN III, IV, VI: Good eye closure, EOMI.     CN V: Facial sensation intact and symmetric.     CN VII: No facial asymmetry.     CN VIII: Hearing intact.     CN IX and X: Palate elevates symmetrically. Normal gag reflex.    CN XI: Symmetric shoulder shrug.     CN XII: Tongue midline.    Sensory: Intact light touch, vibration and temperature.    Coordination: No evidence of past-pointing on finger to nose testing, no dysdiadochokinesia. Heel to shin intact.    DTR's: 2+ throughout without clonus.    Babinski: Toes downgoing bilaterally.   Romberg: Negative.   Movements: No tremors observed.   Musculoskeletal:    Strength: 5/5 in upper and lower extremities bilaterally.   Gait: Steady, narrow based. Able to tandem walk.    Tone: Normal bulk and tone.   Joints: No swelling.         Labs Reviewed:    Results for OTTO MIDDLETON (MRN [id number]) as of 2/22/2019 08:19   Ref. Range 1/4/2019 04:19 1/4/2019 04:20   WBC Latest Ref Range: 4.8 - 10.8 K/uL 7.2    RBC Latest Ref Range: 4.70 - 6.10 M/uL 5.46    Hemoglobin Latest Ref Range: 14.0 -  18.0 g/dL 16.5    Hematocrit Latest Ref Range: 42.0 - 52.0 % 48.6    MCV Latest Ref Range: 81.4 - 97.8 fL 89.0    MCH Latest Ref Range: 27.0 - 33.0 pg 30.2    MCHC Latest Ref Range: 33.7 - 35.3 g/dL 34.0    RDW Latest Ref Range: 35.9 - 50.0 fL 40.6    Platelet Count Latest Ref Range: 164 - 446 K/uL 98 (L)    MPV Latest Ref Range: 9.0 - 12.9 fL 11.0    Neutrophils-Polys Latest Ref Range: 44.00 - 72.00 % 42.30 (L)    Neutrophils (Absolute) Latest Ref Range: 1.82 - 7.42 K/uL 3.05    Lymphocytes Latest Ref Range: 22.00 - 41.00 % 24.50    Lymphs (Absolute) Latest Ref Range: 1.00 - 4.80 K/uL 1.77    Monocytes Latest Ref Range: 0.00 - 13.40 % 32.00 (H)    Monos (Absolute) Latest Ref Range: 0.00 - 0.85 K/uL 2.31 (H)    Eosinophils Latest Ref Range: 0.00 - 6.90 % 0.30    Eos (Absolute) Latest Ref Range: 0.00 - 0.51 K/uL 0.02    Basophils Latest Ref Range: 0.00 - 1.80 % 0.30    Baso (Absolute) Latest Ref Range: 0.00 - 0.12 K/uL 0.02    Immature Granulocytes Latest Ref Range: 0.00 - 0.90 % 0.60    Immature Granulocytes (abs) Latest Ref Range: 0.00 - 0.11 K/uL 0.04    Nucleated RBC Latest Units: /100 WBC 0.00    NRBC (Absolute) Latest Units: K/uL 0.00    Sodium Latest Ref Range: 135 - 145 mmol/L  140   Potassium Latest Ref Range: 3.6 - 5.5 mmol/L  3.9   Chloride Latest Ref Range: 96 - 112 mmol/L  105   Co2 Latest Ref Range: 20 - 33 mmol/L  27   Anion Gap Latest Ref Range: 0.0 - 11.9   8.0   Glucose Latest Ref Range: 65 - 99 mg/dL  109 (H)   Bun Latest Ref Range: 8 - 22 mg/dL  25 (H)   Creatinine Latest Ref Range: 0.50 - 1.40 mg/dL  1.06   GFR If  Latest Ref Range: >60 mL/min/1.73 m 2  >60   GFR If Non  Latest Ref Range: >60 mL/min/1.73 m 2  >60   Calcium Latest Ref Range: 8.5 - 10.5 mg/dL  9.3   AST(SGOT) Latest Ref Range: 12 - 45 U/L  7 (L)   ALT(SGPT) Latest Ref Range: 2 - 50 U/L  12   Alkaline Phosphatase Latest Ref Range: 30 - 99 U/L  47   Total Bilirubin Latest Ref Range: 0.1 - 1.5 mg/dL   1.8 (H)   Albumin Latest Ref Range: 3.2 - 4.9 g/dL  4.1   Total Protein Latest Ref Range: 6.0 - 8.2 g/dL  6.4   Globulin Latest Ref Range: 1.9 - 3.5 g/dL  2.3   A-G Ratio Latest Units: g/dL  1.8       Imaging reviewed:  Today I reviewed the patient's most recent MRI images with him in the examination room. I explained basic terminology of MRI's, verbalized my assessment, and answered his questions.       MRI Brain w/o contrast from 12/31/18  1.  Grossly stable size of LEFT temporal parenchymal hematoma. No associated enhancement to suggest underlying mass or vascular malformation  2.  New intraventricular extension of blood without hydrocephalus  3.  Foci of remote microhemorrhage in the RIGHT temporal lobe and RIGHT cerebellar hemisphere, possibly related to hypertension or amyloid angiopathy  4.  Mild-to-moderate atrophy  5.  Mild white matter changes         Assessment/Plan:    Intracranial hemorrhage (HCC)  Mr. Vincent is an 82 yo M with pmhx of right arm venous blood clot in the 1970's treated surgically, melanoma and basal cell skin cancer s/p removal with no previous evidence of metastases, chronically low platelets of unknown etiology, who had a left temporal parenchymal hemorrhagic stroke on 12/31/18 with intraventricular extension. There were multiple punctate foci of increased GRE signal in the right temporal lobe and right cerebellar hemispheres concerning for possible underlying cerebral amyloid angiopathy although hypertension could cause such changes.     The location of Mr. Vincent's hemorrhage in the left temporal area has left him with predominantly memory impairment. I discussed the possible etiologies of his stroke with him and his wife today. The location is somewhat atypical for pure hypertensive ICH. Given the microhemorrhages, cerebral amyloid angiopathy is highly suspicious, but he has also had chronic thrombocytopenia for several years according to his wife. He had not been on any medications  "which could cause this, never received chemotherapy for his melanoma or any other reason, and has no history of autoimmunity. It may be possible that the low platelet count in combination with underlying CAA or hypertension could have caused the bleed. I stressed the importance of managing his blood pressure, which he has been doing in conjunction with Dr. Cummins. I think at this point, the most important next step in secondary stroke prevention would be to identify an underlying cause of his thrombocytopenia and treat if possible and as appropriate.     Plan discussed today:  1. Avoid blood thinners and anti-platelet medications  2. Maintain blood pressure <140/90  3. Given clinical stability, no new head imaging right now  4. Lab slip given today to repeat his platelet count and if is still low I will place a referral for a hematology consult.   5. He can follow up with me in 2-3 months, sooner if any new issues arise.     Seizure (HCC)  Mr. Vincent was started on Keppra after a \"confusional event\" during his hospitalization. He was switched to Depakote after Keppra caused a serious rash - now recovered. Given EEG did not show any epileptiform discharges, I think it is reasonable to taper him off of the Depakote and see how he does. Once he is off Depakote, we will obtain a follow up EEG.     Plan:  1. Decreased Depakote from 250mg BID to 250mg daily for one week then stop.   2. Repeat EEG after he is off Depakote.   3. Patient should not drive. Will fax medical form to [employer].       Greater than 50% of this 60 minute face to face encounter was devoted to disease state counseling on stroke, seizure, and coordination of care. Additional time was spent reviewing MRI imaging with the patient in the exam room, his lab studies, and inpatient records. This note was completed using dictation software resulting in occasional speech recognition errors. (see above assessment and plan for further details of discussion). "

## 2019-02-22 NOTE — LETTER
February 24, 2019        Dr. Finn Cummins D.O.   Phone: [phone]  Fax: [phone]        Re:  Elias Vincent          Dear Dr. Cummins,    It was a pleasure seeing your patient, Elias Vincent, on 2/22/2019. Diagnoses of Thrombocytopathia (HCC), Seizure (HCC), Thrombocytopenia (HCC), and Intracranial hemorrhage (HCC) were pertinent to this visit.     Please find enclosed a copy of this visit encounter which includes the history I obtained from Mr. Vincent, my physical examination findings, my impression and recommendations.      Once again, it was a pleasure participating in your patient's care.  Please feel free to contact me if you have any questions or if I can be of any further assistance to your patients.        Sincerely,        Yelitza Story D.O., M.P.H  MS specialist.   Board Certified Neurologist.  Neurology Clerkship Director, [facility].    Neurology,  [facility].   Tel: [phone]  Fax: [phone]

## 2019-02-24 PROBLEM — I15.9 SECONDARY HYPERTENSION: Status: RESOLVED | Noted: 2019-01-01 | Resolved: 2019-02-24

## 2019-02-24 PROBLEM — R56.9 SEIZURE (HCC): Status: ACTIVE | Noted: 2019-02-24

## 2019-02-24 LAB
BASOPHILS # BLD AUTO: ABNORMAL 10*3/UL
BASOPHILS NFR BLD AUTO: ABNORMAL %
EOSINOPHIL # BLD AUTO: ABNORMAL 10*3/UL
EOSINOPHIL NFR BLD AUTO: ABNORMAL %
ERYTHROCYTE [DISTWIDTH] IN BLOOD BY AUTOMATED COUNT: 13.5 % (ref 12.3–15.4)
HCT VFR BLD AUTO: 48.3 % (ref 37.5–51)
HGB BLD-MCNC: 16.6 G/DL (ref 13–17.7)
IMM GRANULOCYTES # BLD AUTO: ABNORMAL 10*3/UL
IMM GRANULOCYTES NFR BLD AUTO: ABNORMAL %
IMMATURE CELLS  115398: ABNORMAL
LYMPHOCYTES # BLD AUTO: ABNORMAL 10*3/UL
LYMPHOCYTES NFR BLD AUTO: ABNORMAL %
MCH RBC QN AUTO: 30.2 PG (ref 26.6–33)
MCHC RBC AUTO-ENTMCNC: 34.4 G/DL (ref 31.5–35.7)
MCV RBC AUTO: 88 FL (ref 79–97)
MONOCYTES # BLD AUTO: ABNORMAL 10*3/UL
MONOCYTES NFR BLD AUTO: ABNORMAL %
MORPHOLOGY BLD-IMP: ABNORMAL
NEUTROPHILS # BLD AUTO: ABNORMAL 10*3/UL
NEUTROPHILS NFR BLD AUTO: ABNORMAL %
NRBC BLD AUTO-RTO: ABNORMAL %
PLATELET # BLD AUTO: 74 X10E3/UL (ref 150–379)
RBC # BLD AUTO: 5.5 X10E6/UL (ref 4.14–5.8)
WBC # BLD AUTO: 5.1 X10E3/UL (ref 3.4–10.8)

## 2019-02-25 NOTE — ASSESSMENT & PLAN NOTE
"Mr. Vincent was started on Keppra after a \"confusional event\" during his hospitalization. He was switched to Depakote after Keppra caused a serious rash - now recovered. Given EEG did not show any epileptiform discharges, I think it is reasonable to taper him off of the Depakote and see how he does. Once he is off Depakote, we will obtain a follow up EEG.     Plan:  1. Decreased Depakote from 250mg BID to 250mg daily for one week then stop.   2. Repeat EEG after he is off Depakote.   3. Patient should not drive. Will fax medical form to [employer].   "

## 2019-02-25 NOTE — ASSESSMENT & PLAN NOTE
Mr. 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 There were multiple punctate foci of increased GRE signal in the right temporal lobe and right cerebellar hemispheres concerning for possible underlying cerebral amyloid angiopathy although hypertension could cause such changes.     The location of Mr. Vincent's hemorrhage in the left temporal area has left him with predominantly memory impairment. I discussed the possible etiologies of his stroke with him and his wife today. The location is somewhat atypical for pure hypertensive ICH. Given the microhemorrhages, cerebral amyloid angiopathy is highly suspicious, but he has also had chronic thrombocytopenia for several years according to his wife. He had not been on any medications which could cause this, never received chemotherapy for his melanoma or any other reason, and has no history of autoimmunity. It may be possible that the low platelet count in combination with underlying CAA or hypertension could have caused the bleed. I stressed the importance of managing his blood pressure, which he has been doing in conjunction with Dr. Cummins. I think at this point, the most important next step in secondary stroke prevention would be to identify an underlying cause of his thrombocytopenia and treat if possible and as appropriate.     Plan discussed today:  1. Avoid blood thinners and anti-platelet medications  2. Maintain blood pressure <140/90  3. Given clinical stability, no new head imaging right now  4. Lab slip given today to repeat his platelet count and if is still low I will place a referral for a hematology consult.   5. He can follow up with me in 2-3 months, sooner if any new  issues arise.

## 2019-02-27 ENCOUNTER — HOSPITAL ENCOUNTER (OUTPATIENT)
Dept: LAB | Facility: MEDICAL CENTER | Age: 84
End: 2019-02-27
Attending: INTERNAL MEDICINE
Payer: MEDICARE

## 2019-02-27 ENCOUNTER — APPOINTMENT (OUTPATIENT)
Dept: PHYSICAL THERAPY | Facility: REHABILITATION | Age: 84
End: 2019-02-27
Attending: FAMILY MEDICINE
Payer: MEDICARE

## 2019-02-27 ENCOUNTER — OFFICE VISIT (OUTPATIENT)
Dept: HEMATOLOGY ONCOLOGY | Facility: MEDICAL CENTER | Age: 84
End: 2019-02-27
Payer: MEDICARE

## 2019-02-27 VITALS
TEMPERATURE: 97.9 F | DIASTOLIC BLOOD PRESSURE: 60 MMHG | SYSTOLIC BLOOD PRESSURE: 116 MMHG | WEIGHT: 149.25 LBS | HEIGHT: 67 IN | BODY MASS INDEX: 23.43 KG/M2 | OXYGEN SATURATION: 95 % | HEART RATE: 67 BPM | RESPIRATION RATE: 16 BRPM

## 2019-02-27 DIAGNOSIS — D69.6 THROMBOCYTOPENIA (HCC): ICD-10-CM

## 2019-02-27 LAB
BASOPHILS # BLD AUTO: 0 % (ref 0–1.8)
BASOPHILS # BLD: 0 K/UL (ref 0–0.12)
D DIMER PPP IA.FEU-MCNC: <0.4 UG/ML (FEU) (ref 0–0.5)
EOSINOPHIL # BLD AUTO: 0 K/UL (ref 0–0.51)
EOSINOPHIL NFR BLD: 0 % (ref 0–6.9)
ERYTHROCYTE [DISTWIDTH] IN BLOOD BY AUTOMATED COUNT: 44.1 FL (ref 35.9–50)
FACT VIII ACT/NOR PPP: 194 % (ref 45–145)
HCT VFR BLD AUTO: 52.2 % (ref 42–52)
HGB BLD-MCNC: 16.8 G/DL (ref 14–18)
INHIBITOR INDICATED 1863: NO
LDH SERPL L TO P-CCNC: 196 U/L (ref 107–266)
LYMPHOCYTES # BLD AUTO: 1.45 K/UL (ref 1–4.8)
LYMPHOCYTES NFR BLD: 22.6 % (ref 22–41)
MANUAL DIFF BLD: ABNORMAL
MCH RBC QN AUTO: 30.4 PG (ref 27–33)
MCHC RBC AUTO-ENTMCNC: 32.2 G/DL (ref 33.7–35.3)
MCV RBC AUTO: 94.4 FL (ref 81.4–97.8)
MEDICATIONS NOTED 1688: NORMAL
MONOCYTES # BLD AUTO: 2.44 K/UL (ref 0–0.85)
MONOCYTES NFR BLD AUTO: 38.2 % (ref 0–13.4)
MYELOCYTES NFR BLD MANUAL: 0.9 %
NEUTROPHILS NFR BLD: 37.4 % (ref 44–72)
NEUTS BAND NFR BLD MANUAL: 0.9 % (ref 0–10)
PLATELET # BLD AUTO: 107 K/UL (ref 164–446)
PLT FUNCTION COL/EPI  1661: 97 SEC (ref 83–170)
PMV BLD AUTO: 11.5 FL (ref 9–12.9)
RBC # BLD AUTO: 5.53 M/UL (ref 4.7–6.1)
WBC # BLD AUTO: 6.4 K/UL (ref 4.8–10.8)

## 2019-02-27 PROCEDURE — 85007 BL SMEAR W/DIFF WBC COUNT: CPT

## 2019-02-27 PROCEDURE — 85245 CLOT FACTOR VIII VW RISTOCTN: CPT

## 2019-02-27 PROCEDURE — 85240 CLOT FACTOR VIII AHG 1 STAGE: CPT | Mod: XU

## 2019-02-27 PROCEDURE — 85246 CLOT FACTOR VIII VW ANTIGEN: CPT

## 2019-02-27 PROCEDURE — 85027 COMPLETE CBC AUTOMATED: CPT

## 2019-02-27 PROCEDURE — 85240 CLOT FACTOR VIII AHG 1 STAGE: CPT

## 2019-02-27 PROCEDURE — 99204 OFFICE O/P NEW MOD 45 MIN: CPT | Performed by: INTERNAL MEDICINE

## 2019-02-27 PROCEDURE — 85576 BLOOD PLATELET AGGREGATION: CPT

## 2019-02-27 PROCEDURE — 36415 COLL VENOUS BLD VENIPUNCTURE: CPT

## 2019-02-27 PROCEDURE — 85379 FIBRIN DEGRADATION QUANT: CPT

## 2019-02-27 PROCEDURE — 83615 LACTATE (LD) (LDH) ENZYME: CPT

## 2019-02-27 ASSESSMENT — PAIN SCALES - GENERAL: PAINLEVEL: NO PAIN

## 2019-02-27 NOTE — PROGRESS NOTES
Consult Note: Hematology    Date of consultation: 2/27/2019 9:13 AM    Referring provider: Yelitza Story D.O.    Reason for consultation: History of intraventricular bleed    History of presenting illness:     1/2019- developed sudden headache which was sharp generalized severe associated with memory loss   .  He denies any falls or injuries.  He does not take any aspirin and NSAIDs or anticoagulants.   CT head- There is hemorrhage within the temporal horn of the left lateral ventricle.   cta of the head showed:1. No aneurysm or hemodynamically significant narrowing of the major intracranial vessels.  cta of the neck showed:CT angiogram of the neck within normal limits.  Hemorrhage within the left lateral ventricle.  Head mri showed:1.  Grossly stable size of LEFT temporal parenchymal hematoma. No associated enhancement to suggest underlying mass or vascular malformation  2.  New intraventricular extension of blood without hydrocephalus  3.  Foci of remote microhemorrhage in the RIGHT temporal lobe and RIGHT cerebellar hemisphere, possibly related to hypertension or amyloid angiopathy    Guaiac testing at that time showed minimally elevated APTT.  Platelet counts were noted to be mildly low in the 90,000 range.  Follow-up CBC showed platelet count of 70,000.  There is some monocytosis , WBC and hemoglobin is otherwise unremarkable.  MCV is within normal limits.  No family history of hemophilia or other bleeding problems.  He has side effects with the various antiseizure medication and is currently on valproate.  He has some aphasia which is improving.    Past Medical History:    Past Medical History:   Diagnosis Date   • Basal cell adenocarcinoma    • Blood clot in vein     Left arm - had to have his rib removed.    • Hyperlipidemia    • Intracranial hemorrhage (HCC) 12/31/2018   • Melanoma (HCC)    • Peritonitis (HCC)     Ruptured appendix in the 1960s       Past surgical history:    Past Surgical History:    Procedure Laterality Date   • APPENDECTOMY     • CHOLECYSTECTOMY     • RIB RESECTION  1970s    Removed due to a blood clot in his shoulder       Allergies:  Levetiracetam and Oxcarbazepine    Medications:    Current Outpatient Prescriptions   Medication Sig Dispense Refill   • divalproex ER (DEPAKOTE ER) 500 MG TABLET SR 24 HR Take 1,000 mg by mouth every day.     • LORazepam (ATIVAN) 1 MG Tab Take 1 mg by mouth every 8 hours as needed for Anxiety.     • acetaminophen (TYLENOL) 500 MG Tab Take 500-1,000 mg by mouth every 6 hours as needed for Mild Pain or Moderate Pain.     • levETIRAcetam (KEPPRA) 500 MG Tab Take 1 Tab by mouth 2 Times a Day. (Patient not taking: Reported on 2/8/2019) 28 Tab 0   • Omega-3 Fatty Acids (OMEGA-3 FISH OIL PO) Take 1 Tab by mouth every day.     • Flaxseed, Linseed, (FLAXSEED OIL PO) Take 1 Tab by mouth every day.     • multivitamin (THERAGRAN) Tab Take 1 Tab by mouth every day.     • atorvastatin (LIPITOR) 10 MG Tab Take 10 mg by mouth every day.     • LATANOPROST OP 1 Drop by Ophthalmic route every day at 6 PM.     • omeprazole (PRILOSEC) 20 MG CPDR Take 20 mg by mouth every day.       No current facility-administered medications for this visit.        Social History:     Social History     Social History   • Marital status:      Spouse name: N/A   • Number of children: N/A   • Years of education: N/A     Occupational History   • Not on file.     Social History Main Topics   • Smoking status: Never Smoker   • Smokeless tobacco: Never Used   • Alcohol use No   • Drug use: No   • Sexual activity: Yes     Other Topics Concern   • Not on file     Social History Narrative   • No narrative on file       Family History:     Family History   Problem Relation Age of Onset   • Heart Disease Mother    • Cancer Father         Brain   • Cancer Sister    • Heart Disease Brother    • Stroke Sister    • No Known Problems Son    • No Known Problems Son    • No Known Problems Son    • No Known  Problems Son    • No Known Problems Daughter    • No Known Problems Daughter    • No Known Problems Daughter    • Dementia Maternal Aunt        Review of Systems:  All other review of systems are negative except what was mentioned above in the HPI.    Constitutional: No fever, chills, weight loss ,malaise/fatigue.    HEENT: No new auditory or visual complaints. No sore throat and neck pain.     Respiratory:No new cough, sputum production, shortness of breath and wheezing.    Cardiovascular: No new chest pain, palpitations, orthopnea and leg swelling.    Gastrointestinal: No heartburn, nausea, vomiting ,abdominal pain, hematochezia or melena     Genitourinary: Negative for dysuria, hematuria    Musculoskeletal: No new arthralgias or myalgias   Skin: Negative for rash and itching.    Neurological: Negative for focal weakness or headaches.    Endo/Heme/Allergies: No abnormal bleed/bruise.    Psychiatric/Behavioral: No new depression/anxiety.    Physical Exam:  Vitals:   There were no vitals taken for this visit.    General: Not in acute distress, alert and oriented x 3  HEENT: No pallor, icterus. Oropharynx clear.   Neck: Supple, no palpable masses.  Lymph nodes: No palpable cervical, supraclavicular, axillary or inguinal lymphadenopathy.    CVS: regular rate and rhythm, no rubs or gallops  RESP: Clear to auscultate bilaterally, no wheezing or crackles.   ABD: Soft, non tender, non distended, positive bowel sounds, no palpable organomegaly  EXT: No edema or cyanosis  CNS: Alert and oriented x3, No focal deficits.  Skin- No rash      Labs:   No results for input(s): RBC, HEMOGLOBIN, HEMATOCRIT, PLATELETCT, PROTHROMBTM, APTT, INR, IRON, FERRITIN, TOTIRONBC in the last 72 hours.  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 ? Cerebral amyloidosis     CBC shows mild thrombocytopenia however this is mostly in the 90,000 range and unlikely to have contributed.  There is some monocytosis.  Coagulation parameters were grossly within normal limits.  We will recheck his coag parameters including von Willebrand's screen platelet function assay .  He was not on any NSAIDs or other anticoagulants.  Check d-dimer to rule out any DIC type of presentation.    Overall informed him that it is less likely that his bleeding is related to thrombocytopenia.  Most recent CBC shows mild worsening of his platelet count.  Will obtain repeat CBC with EDTA and citrate tube to rule out any pseudothrombocytopenia.    If he continues to develop worsening from cytopenia will consider more workup including bone marrow biopsy CT of his abdomen pelvis.  Return to clinic in 4 weeks.    Given the unexplained monocytosis will obtain a peripheral blood flow cytometry to rule out any clonal abnormalities.    He agreed and verbalized his agreement and understanding with the current plan.  I answered all questions and concerns he has at this time.              Thank you for allowing me to participate in his care.    Please note that this dictation was created using voice recognition software. I have made every reasonable attempt to correct obvious errors, but I expect that there are errors of grammar and possibly content that I did not discover before finalizing the note.      SIGNATURES:  Artur Grey    CC:  ULISES Orr, Yelitza CLEMENT D.O.

## 2019-02-28 ENCOUNTER — SPEECH THERAPY (OUTPATIENT)
Dept: SPEECH THERAPY | Facility: REHABILITATION | Age: 84
End: 2019-02-28
Attending: FAMILY MEDICINE
Payer: MEDICARE

## 2019-02-28 DIAGNOSIS — I69.111 MEMORY DEFICIT FOLLOWING NONTRAUMATIC INTRACEREBRAL HEMORRHAGE: ICD-10-CM

## 2019-02-28 DIAGNOSIS — R41.840 COGNITIVE ATTENTION DEFICIT: ICD-10-CM

## 2019-02-28 PROCEDURE — 92507 TX SP LANG VOICE COMM INDIV: CPT

## 2019-02-28 NOTE — OP THERAPY DAILY TREATMENT
Outpatient Speech Therapy  DAILY TREATMENT     [facility] Speech [address].  [address]  [place] [address]  Phone:  [phone]  Fax:  [phone]    Date: 02/28/2019    Patient: Elias Vincent  YOB: 1935  MRN: [id number]     Time Calculation  Start time: 0910  Stop time: 1000 Time Calculation (min): 50 minutes     Chief Complaint: Other (Cognitive attention)    Visit #: 3    Subjective:   Reason for Therapy:     Reason For Evaluation:  Cognition (Attention/ Memory/ Executive functions)  Social Support:     Accompanied By:  Spouse (wife present and supportive)    Patient Mental Status:  Alert and Responsive  Progress Factors:     Progression:  Getting Better  Additional Subjective Comments:      Patient/ wife reports no recent changes to medical history.  Patient pleasant and cooperative throughout skilled intervention session. Patient with noted improvements in processing speed during completion of structured therapy tasks.       Objective:   Treatments/Interventions Performed:  Cognitive-Linguistic training, Patient/Caregiver education and Compensatory strategy training         Speech Therapy Assessment:     Cognitive Linguistic Assessment:     Patient attention divided: WFL (Complex, divided attention completed to 100% accuracy; task completed in 10 minutes)    Patient orientation to day: [facility]    Patient orientation to month: [facility]    Patient orientation to time: [facility]    Patient orientation to self: [facility]    Patient orientation to place: [facility]    Patient executive functioning ability: Moderate (61.5% accuracy to accurately calculating checkbook ledger)    Cognitive-Linguistic comments: Patient independent in use of double checking to improve accuracy in completion of complex, divided attention task. Patient with improved accuracy in following of complex direction 2 step/ 4 components completing to 100% accuracy independent.  Organizing functional information targeted through  "completion of checkbook ledger task with patient completing to 8/13 = 61.5% accuracy, use of calculator necessary to increase accuracy during task. Work reviewed with patient with education provided on strategies to implement to assist in increasing accuracy such as reducing visual field to increase organization and reduce distraction and double checking of work. Patient/ wife verbalized understanding to all education provided. Patient wife requested materials for home program related to patient increased difficulty with \"reasoning\".  Materials provided to address.         Speech Therapy Plan :   Therapy Recommendations  Recommendation: Individual Speech Therapy,  Planned Therapy Interventions:  Compensatory Strategy Training, Home Program, Patient/Caregiver Education and Cognitive-Linguistic training,   Plan Details:  Continue with POC.  Materials provided to address reasoning skill as part of home program.              "

## 2019-03-01 ENCOUNTER — APPOINTMENT (OUTPATIENT)
Dept: PHYSICAL THERAPY | Facility: REHABILITATION | Age: 84
End: 2019-03-01
Attending: FAMILY MEDICINE
Payer: MEDICARE

## 2019-03-03 LAB
FACT VIII ACT/NOR PPP: 150 % (ref 56–191)
VWF AG ACT/NOR PPP IA: 199 % (ref 52–214)
VWF:RCO ACT/NOR PPP PL AGG: 125 % (ref 51–215)

## 2019-03-04 ENCOUNTER — SPEECH THERAPY (OUTPATIENT)
Dept: SPEECH THERAPY | Facility: REHABILITATION | Age: 84
End: 2019-03-04
Attending: FAMILY MEDICINE
Payer: MEDICARE

## 2019-03-04 DIAGNOSIS — I69.111 MEMORY DEFICIT FOLLOWING NONTRAUMATIC INTRACEREBRAL HEMORRHAGE: ICD-10-CM

## 2019-03-04 DIAGNOSIS — R41.840 COGNITIVE ATTENTION DEFICIT: ICD-10-CM

## 2019-03-04 PROCEDURE — 92507 TX SP LANG VOICE COMM INDIV: CPT

## 2019-03-05 NOTE — OP THERAPY DAILY TREATMENT
Outpatient Speech Therapy  DAILY TREATMENT     [facility] Speech [address].  [address]  [place] [address]  Phone:  [phone]  Fax:  [phone]    Date: 03/04/2019    Patient: Elias Vincent  YOB: 1935  MRN: [id number]     Time Calculation  Start time: 1530  Stop time: 1605 Time Calculation (min): 35 minutes     Chief Complaint: Other (cognitive attention) and Poor Memory    Visit #: 4    Subjective:   Reason for Therapy:     Reason For Evaluation:  Cognition (Attention/ Memory)  Social Support:     Accompanied By:  Spouse (wife present and supportive)    Patient Mental Status:  Alert and Responsive  Additional Subjective Comments:      Patient remains highly motivated to improve cognitive linguistic function to previous level. Patient/ wife receptive to education and complaint with home program addressing cognitive linguistic skill development.       Objective:   Treatments/Interventions Performed:  Patient/Caregiver education, Compensatory strategy training and Cognitive-Linguistic training  Treatment Intervention tool(s) used:  RWAVS compensatory memory strategy educated/ trained.   Counting money subtest of Assessment of Language Related Function Activities (CHRISTO) administered. Patient achieved a score of 10/10 = 100% accuracy with patient demonstrating independence in use of writing down information to increase accuracy. Patient was noted to require frequent repetition of directions to increase accuracy.          Speech Therapy Assessment:     Cognitive Linguistic Assessment:     Patient simple problem solving ability: Minimal (Fill-in-missing letters to concrete category (weather) addressed:  patient completed to 50% accuracy independent; when provided with moderate semantic/ verbal cues (50%) accuracy increased to 100%)    Cognitive-Linguistic comments: Time delay/ short term memory recall skills addressed through education/ training in compensatory memory  strategy:  RWAVS.  Components of memory strategy educated at length with patient requiring direct cues regarding when to use strategies.  Given patient/ wife concern regarding patient time delay recall, patient encouraged to begin daily memory journal in home setting to practice write it down memory strategy for daily events to increase independence in recall.          Speech Therapy Plan :   Therapy Recommendations  Recommendation: Individual Speech Therapy,  Planned Therapy Interventions:  Compensatory Strategy Training, Home Program, Patient/Caregiver Education and Cognitive-Linguistic training,   Plan Details:  Continue with POC.  Implement daily memory journal in home setting.  RWAVS sent home to review.

## 2019-03-07 ENCOUNTER — SPEECH THERAPY (OUTPATIENT)
Dept: SPEECH THERAPY | Facility: REHABILITATION | Age: 84
End: 2019-03-07
Attending: FAMILY MEDICINE
Payer: MEDICARE

## 2019-03-07 DIAGNOSIS — I69.111 MEMORY DEFICIT FOLLOWING NONTRAUMATIC INTRACEREBRAL HEMORRHAGE: ICD-10-CM

## 2019-03-07 DIAGNOSIS — R41.840 COGNITIVE ATTENTION DEFICIT: ICD-10-CM

## 2019-03-07 PROCEDURE — 92507 TX SP LANG VOICE COMM INDIV: CPT

## 2019-03-07 NOTE — OP THERAPY DAILY TREATMENT
"  Outpatient Speech Therapy  DAILY TREATMENT     [facility] Speech [address].  [address]  [place] [address]  Phone:  [phone]  Fax:  [phone]    Date: 03/07/2019    Patient: Elias Vincent  YOB: 1935  MRN: [id number]     Time Calculation  Start time: 1535  Stop time: 1605 Time Calculation (min): 30 minutes     Chief Complaint: Other (cognitive attention) and Poor Memory    Visit #: 5    Subjective:   Reason for Therapy:     Reason For Evaluation:  Cognition (Attention/ Memory)  Social Support:     Accompanied By:  Spouse (wife present and supportive)    Patient Mental Status:  Alert and Responsive  Additional Subjective Comments:      Patient reports \"not feeling well\" this morning but reports feeling \"ok\" at start of session.  Patient motivated with good attention to structured therapy tasks demonstrated during session.       Objective:   Treatments/Interventions Performed:  Cognitive-Linguistic training, Patient/Caregiver education and Compensatory strategy training         Speech Therapy Assessment:     Cognitive Linguistic Assessment:     Patient attention divided: WFL (complex, divided attention to solving mixed math [id number] = 95% accuracy independent)    Patient immediate memory: Minimal    Patient recent and short term memory: Minimal    Patient prospective and time delay memory: Minimal    Cognitive-Linguistic comments: Repetition memory strategy addressed short term memory recall of 4 items:  Patient completed repetition with 80% accuracy; patient identified words described through word retention 11/12 = 91.6% accuracy. Memory recall addressed through structured recall:  4 related items: association through linking items through story educated/ trained with patient demonstrating independence and accuracy in recall to 2, 7 and 20 minute delays 100% accuracy.  Therapy progressed to short term/ time delay recall of 4 unrelated words: patient demonstrated " increased difficulty in generation of simple story to link items requiring 3 tries; however, once story was established patient recalled 4/4 items to 2 minute delay 3/4 (75%) to 7 minute delay.         Speech Therapy Plan :   Therapy Recommendations  Recommendation: Individual Speech Therapy,  Planned Therapy Interventions:  Home Program, Patient/Caregiver Education, Compensatory Strategy Training and Cognitive-Linguistic training,   Plan Details:  Continue with POC.  Cognitive exercise to address memory recall provided as part of home program.

## 2019-03-11 ENCOUNTER — TELEPHONE (OUTPATIENT)
Dept: HEMATOLOGY ONCOLOGY | Facility: MEDICAL CENTER | Age: 84
End: 2019-03-11

## 2019-03-11 ENCOUNTER — SPEECH THERAPY (OUTPATIENT)
Dept: SPEECH THERAPY | Facility: REHABILITATION | Age: 84
End: 2019-03-11
Attending: FAMILY MEDICINE
Payer: MEDICARE

## 2019-03-11 DIAGNOSIS — R41.840 COGNITIVE ATTENTION DEFICIT: ICD-10-CM

## 2019-03-11 DIAGNOSIS — I69.111 MEMORY DEFICIT FOLLOWING NONTRAUMATIC INTRACEREBRAL HEMORRHAGE: ICD-10-CM

## 2019-03-11 PROCEDURE — 92507 TX SP LANG VOICE COMM INDIV: CPT

## 2019-03-11 NOTE — OP THERAPY DAILY TREATMENT
"  Outpatient Speech Therapy  DAILY TREATMENT     [facility] Speech [address]  [address]  [place] [address]  Phone:  [phone]  Fax:  [phone]    Date: 03/11/2019    Patient: Elias Vincent  YOB: 1935  MRN: [id number]     Time Calculation  Start time: 1105  Stop time: 1138 Time Calculation (min): 33 minutes     Chief Complaint: Other (cognitive attention) and Poor Memory    Visit #: 6    Subjective:   Reason for Therapy:     Reason For Evaluation:  Cognition (Memory)  Social Support:     Accompanied By:  Spouse (wife present and supportive)    Patient Mental Status:  Alert and Responsive  Additional Subjective Comments:      Patient/ wife arrived to speech therapy with reports of patient \"not feeling too well today.\"  Patient reports changes to initiation \"I don't feel like I want to do anything\" with wife reporting that patient was experiencing \"more shakey\" over the weekend.  Patient participated well with all therapy tasks and was noted to demonstrate improved attention to structured therapy tasks.       Objective:   Treatments/Interventions Performed:  Patient/Caregiver education, Compensatory strategy training and Cognitive-Linguistic training         Speech Therapy Assessment:     Cognitive Linguistic Assessment:     Cognitive-Linguistic comments: Cognitive linguistic skill targeted through completion of:  Multi-level sequencing with patient completing 6 step sequences to 100% accuracy. Patient noted to necessitate increased processing time to complete task; however, patient with improvement in overall attention during difficult task.  Mental manipulation targeted:  3 unit/ mixed addition/ subtraction completed with 9/10 - 90% accuracy. Short term/ time delay recall targeted:  Patient independent in recall and use of compensatory memory strategy recalling 3/5 items (60% accuracy) following 5 minute delay.         Speech Therapy Plan :   Therapy " Recommendations  Recommendation: Individual Speech Therapy,  Planned Therapy Interventions:  Compensatory Strategy Training, Home Program, Patient/Caregiver Education and Cognitive-Linguistic training,   Plan Details:  Continue with POC.

## 2019-03-11 NOTE — TELEPHONE ENCOUNTER
Patient's wife, Evelin, called from [phone] requesting results of labs that they completed on 02/27/18. Please call her back as soon as possible.

## 2019-03-12 NOTE — TELEPHONE ENCOUNTER
Wife was concerned with the platelet count since the prior lab was a critical value. RN reviewed that the lab for platelets was still low but had actually improved and this may be due to the type of tubes they collected them with. Wife denies the patient having an s/s of bleeding at this time and RN stated that Dr. Grey would review results in detail as well as his plan going forward at next appt on 3/22/2019. Wife thankful for the call and stated she confirmed appt time and date.

## 2019-03-13 ENCOUNTER — SPEECH THERAPY (OUTPATIENT)
Dept: SPEECH THERAPY | Facility: REHABILITATION | Age: 84
End: 2019-03-13
Attending: FAMILY MEDICINE
Payer: MEDICARE

## 2019-03-13 DIAGNOSIS — I69.111 MEMORY DEFICIT FOLLOWING NONTRAUMATIC INTRACEREBRAL HEMORRHAGE: ICD-10-CM

## 2019-03-13 DIAGNOSIS — R41.840 COGNITIVE ATTENTION DEFICIT: ICD-10-CM

## 2019-03-13 PROCEDURE — 92507 TX SP LANG VOICE COMM INDIV: CPT

## 2019-03-13 NOTE — OP THERAPY DAILY TREATMENT
"  Outpatient Speech Therapy  DAILY TREATMENT     [facility] Speech [address].  [address]  [place] [address]  Phone:  [phone]  Fax:  [phone]    Date: 03/13/2019    Patient: Elias Vincent  YOB: 1935  MRN: [id number]     Time Calculation  Start time: 0900  Stop time: 1000 Time Calculation (min): 60 minutes     Chief Complaint: Other (cognitive attention) and Poor Memory    Visit #: 7    Subjective:   Reason for Therapy:     Reason For Evaluation:  Cognition  Social Support:     Accompanied By:  Spouse (wife, present and supportive)    Patient Mental Status:  Alert and Responsive  Additional Subjective Comments:      Patient reports that he's feeling better but also states that \"it's seems like such a slow process.\"  Patient remains highly motivated to improve cognitive linguistic skills to previous level.       Objective:   Treatment Intervention tool(s) used:  Short term/ time delay memory strategy (RWAVS/ association, linking through story). Structured tasks addressing cognitive in areas of attention/ executive function.          Speech Therapy Assessment:     Cognitive Linguistic Assessment:     Patient attention divided: WFL    Patient recent and short term memory: Minimal    Patient prospective and time delay memory: Minimal    Patient executive functioning ability: Minimal (Recording all charges to parameters using checkbook ledger:  completed with 21/23 = 91.3% accuracy)    Cognitive-Linguistic comments: Association/ linking 5 unrelated items through story addressed: patient required initial, direct instruction to assist in linking items stating increased difficulty in linking the words together.  To 7 minute delay, patient was independent in recall of 1/5 (20%) of items, given direct instruction/ cues, patient accuracy increased to recall of 4/5 (80%) of items.  Second trial resulted in marked improvement in independence in linking items to create story with " patient independent in recall of 5/5 items to 6 minute delay.     Complex, divided attention addressed through completion of alternating food/ places to visit using letters of alphabet as parameters. Patient completed the task in 10 minutes with 24/26 = 92.3% accuracy.         Speech Therapy Plan :   Therapy Recommendations  Recommendation: Individual Speech Therapy,  Planned Therapy Interventions:  Compensatory Strategy Training, Home Program, Patient/Caregiver Education and Cognitive-Linguistic training,   Plan Details:  Continue with POC.  Reinforce home program.

## 2019-03-18 ENCOUNTER — APPOINTMENT (OUTPATIENT)
Dept: SPEECH THERAPY | Facility: REHABILITATION | Age: 84
End: 2019-03-18
Attending: FAMILY MEDICINE
Payer: MEDICARE

## 2019-03-21 ENCOUNTER — SPEECH THERAPY (OUTPATIENT)
Dept: SPEECH THERAPY | Facility: REHABILITATION | Age: 84
End: 2019-03-21
Attending: FAMILY MEDICINE
Payer: MEDICARE

## 2019-03-21 DIAGNOSIS — I69.111 MEMORY DEFICIT FOLLOWING NONTRAUMATIC INTRACEREBRAL HEMORRHAGE: ICD-10-CM

## 2019-03-21 DIAGNOSIS — R41.840 COGNITIVE ATTENTION DEFICIT: ICD-10-CM

## 2019-03-21 PROCEDURE — 92507 TX SP LANG VOICE COMM INDIV: CPT

## 2019-03-21 ASSESSMENT — SOCIAL DETERMINANTS OF HEALTH (SDOH): SOCIAL_SUPPORT_SYSTEM: CHILDREN

## 2019-03-21 NOTE — OP THERAPY DAILY TREATMENT
"  Outpatient Speech Therapy  DAILY TREATMENT     [facility] Speech [address].  [address]  [place] [address]  Phone:  [phone]  Fax:  [phone]    Date: 03/21/2019    Patient: Elias Vincent  YOB: 1935  MRN: [id number]     Time Calculation  Start time: 0803  Stop time: 0900 Time Calculation (min): 57 minutes     Chief Complaint: Other (cognitive attention) and Poor Memory    Visit #: 8    Subjective:   Reason for Therapy:     Reason For Evaluation:  Cognition (Attention/ Memory)  Social Support:     Accompanied By:  Children (daughter, visiting in town, present and supportive)    Patient Mental Status:  Alert and Responsive  Additional Subjective Comments:      Patient accompanied to skilled intervention session by his daughter secondary to wife having \"surgery yesterday.\"  Patient daughter reports marked improvement in patient cognitive linguistic function stating \"I am so pleased with how much better he is\" as it related to cognitive linguistic function.       Objective:   Treatments/Interventions Performed:  Cognitive-Linguistic training, Patient/Caregiver education and Compensatory strategy training  Treatment Intervention tool(s) used:  Cognitive linguistic therapy targeted:  Complex, divided attention, thought organization, memory recall including review of memory strategies, executive functions through solving daily math problems.     Portions of the Assessment of Language Related Functional Activities (CHRISTO) administered.  Understanding Medicine Labels completed to 9/10 = 90% accuracy (high probability of independent functioning on task). Reading instructions completed to 10/10 = 100% accuracy (high probability of independent functioning on task).           Speech Therapy Assessment:     Cognitive Linguistic Assessment:     Patient orientation to day: [facility]    Patient orientation to month: [facility]    Patient orientation to time: [facility]    Patient orientation to self: [facility]   "  Patient orientation to place: [facility]    Cognitive-Linguistic comments: Complex, divided attention targeted: continuous line alternating number-letter with patient completing with 18/27 = 66.7% accuracy independent.  Patient with noted difficulty in attention when visual scanning was part of divided attention task.  Thought organization addressed through unscrambling single words to concrete/ abstract categories.  Patient completed to 13/20 = 65% accuracy to independent level; accuracy improved to 19/20 = 95% when provided with min verbal cues in the form of strategies to increase accuracy in task (e.g. Writing down first letter).  Memory strategies reviewed with patient independent in recall of RWAVS including:  Repetition, write it down, associate with patient using strategies independent throughout session to increase accuracy.  Words presented in pairs based on various associations: patient independent in recall of words to 3 pairs before demonstrating reduced accuracy.  Executive functions addressed through solving word problems involving money:  Patient completed with 3/5 = 60% accuracy to independent level; however, when provided with min verbal/ check back cues patient completed to 100% accuracy.         Speech Therapy Plan :   Therapy Recommendations  Recommendation: Individual Speech Therapy,  Planned Therapy Interventions:  Home Program, Patient/Caregiver Education, Compensatory Strategy Training and Cognitive-Linguistic training,   Plan Details:  Continue with POC.  Reinforced home program addressing cognitive linguistic skills as provided daily.

## 2019-03-22 ENCOUNTER — HOSPITAL ENCOUNTER (OUTPATIENT)
Dept: LAB | Facility: MEDICAL CENTER | Age: 84
End: 2019-03-22
Attending: NURSE PRACTITIONER
Payer: MEDICARE

## 2019-03-22 ENCOUNTER — HOSPITAL ENCOUNTER (OUTPATIENT)
Dept: LAB | Facility: MEDICAL CENTER | Age: 84
End: 2019-03-22
Attending: INTERNAL MEDICINE
Payer: MEDICARE

## 2019-03-22 ENCOUNTER — OFFICE VISIT (OUTPATIENT)
Dept: HEMATOLOGY ONCOLOGY | Facility: MEDICAL CENTER | Age: 84
End: 2019-03-22
Payer: MEDICARE

## 2019-03-22 VITALS
HEART RATE: 70 BPM | SYSTOLIC BLOOD PRESSURE: 122 MMHG | HEIGHT: 67 IN | DIASTOLIC BLOOD PRESSURE: 60 MMHG | BODY MASS INDEX: 24.05 KG/M2 | WEIGHT: 153.22 LBS | OXYGEN SATURATION: 95 % | TEMPERATURE: 97.8 F

## 2019-03-22 DIAGNOSIS — D69.6 THROMBOCYTOPENIA (HCC): ICD-10-CM

## 2019-03-22 DIAGNOSIS — Z09 ENCOUNTER FOR HEMATOLOGY FOLLOW-UP: ICD-10-CM

## 2019-03-22 LAB
BASOPHILS # BLD AUTO: 0.5 % (ref 0–1.8)
BASOPHILS # BLD: 0.03 K/UL (ref 0–0.12)
D DIMER PPP IA.FEU-MCNC: <0.4 UG/ML (FEU) (ref 0–0.5)
EOSINOPHIL # BLD AUTO: 0.01 K/UL (ref 0–0.51)
EOSINOPHIL NFR BLD: 0.2 % (ref 0–6.9)
ERYTHROCYTE [DISTWIDTH] IN BLOOD BY AUTOMATED COUNT: 44.9 FL (ref 35.9–50)
HCT VFR BLD AUTO: 51.4 % (ref 42–52)
HGB BLD-MCNC: 16.7 G/DL (ref 14–18)
IMM GRANULOCYTES # BLD AUTO: 0.02 K/UL (ref 0–0.11)
IMM GRANULOCYTES NFR BLD AUTO: 0.3 % (ref 0–0.9)
LYMPHOCYTES # BLD AUTO: 2.18 K/UL (ref 1–4.8)
LYMPHOCYTES NFR BLD: 35.2 % (ref 22–41)
MCH RBC QN AUTO: 30.5 PG (ref 27–33)
MCHC RBC AUTO-ENTMCNC: 32.5 G/DL (ref 33.7–35.3)
MCV RBC AUTO: 93.8 FL (ref 81.4–97.8)
MONOCYTES # BLD AUTO: 1.56 K/UL (ref 0–0.85)
MONOCYTES NFR BLD AUTO: 25.2 % (ref 0–13.4)
NEUTROPHILS # BLD AUTO: 2.39 K/UL (ref 1.82–7.42)
NEUTROPHILS NFR BLD: 38.6 % (ref 44–72)
NRBC # BLD AUTO: 0 K/UL
NRBC BLD-RTO: 0 /100 WBC
PLATELET # BLD AUTO: 94 K/UL (ref 164–446)
PMV BLD AUTO: 11.5 FL (ref 9–12.9)
RBC # BLD AUTO: 5.48 M/UL (ref 4.7–6.1)
WBC # BLD AUTO: 6.2 K/UL (ref 4.8–10.8)

## 2019-03-22 PROCEDURE — 85379 FIBRIN DEGRADATION QUANT: CPT

## 2019-03-22 PROCEDURE — 36415 COLL VENOUS BLD VENIPUNCTURE: CPT

## 2019-03-22 PROCEDURE — 85025 COMPLETE CBC W/AUTO DIFF WBC: CPT

## 2019-03-22 PROCEDURE — 99214 OFFICE O/P EST MOD 30 MIN: CPT | Performed by: NURSE PRACTITIONER

## 2019-03-22 ASSESSMENT — ENCOUNTER SYMPTOMS
CHILLS: 0
SHORTNESS OF BREATH: 0
DIARRHEA: 0
WEIGHT LOSS: 0
MYALGIAS: 0
PALPITATIONS: 0
HEADACHES: 0
DIZZINESS: 1
NAUSEA: 0
CONSTIPATION: 0
COUGH: 0
TINGLING: 0
BRUISES/BLEEDS EASILY: 0
WHEEZING: 0
TREMORS: 1
FEVER: 0
VOMITING: 0

## 2019-03-22 ASSESSMENT — PAIN SCALES - GENERAL: PAINLEVEL: NO PAIN

## 2019-03-22 NOTE — Clinical Note
Please review labs since your visit in Feb. I had them draw and EDTA & citrate - the results are quite different but he is asymptomatic.  Please let me know if anything else is needed

## 2019-03-22 NOTE — PROGRESS NOTES
Subjective:      Elias Vincent is a 83 y.o. male who presents for Follow-Up (1mo, lab results)         HPI   Mr. Vincent presents for reevaluation of thrombocytopenia.  He is accompanied by his wife and daughter.    Patient continues with moderate fatigue and poor activity tolerance.  He states that he completes physical therapy exercises as previously recommended although he is not following up with PT.  He does experience some dizziness when he receives eyedrops for glaucoma and is noted to have intermittent tremors that family noted prior to his stroke.  He denies bruising, bleeding, epistaxis, hematuria, hematochezia.         Allergies   Allergen Reactions   • Levetiracetam Rash     itchy rash all over body   • Oxcarbazepine Anxiety         Current Outpatient Prescriptions on File Prior to Visit   Medication Sig Dispense Refill   • LORazepam (ATIVAN) 1 MG Tab Take 1 mg by mouth every 8 hours as needed for Anxiety.     • Omega-3 Fatty Acids (OMEGA-3 FISH OIL PO) Take 1 Tab by mouth every day.     • Flaxseed, Linseed, (FLAXSEED OIL PO) Take 1 Tab by mouth every day.     • multivitamin (THERAGRAN) Tab Take 1 Tab by mouth every day.     • atorvastatin (LIPITOR) 10 MG Tab Take 10 mg by mouth every day.     • LATANOPROST OP 1 Drop by Ophthalmic route every day at 6 PM.     • omeprazole (PRILOSEC) 20 MG CPDR Take 20 mg by mouth every day.     • acetaminophen (TYLENOL) 500 MG Tab Take 500-1,000 mg by mouth every 6 hours as needed for Mild Pain or Moderate Pain.       No current facility-administered medications on file prior to visit.          Review of Systems   Constitutional: Positive for malaise/fatigue (not a lot of of tolerance - does PT exercises). Negative for chills, fever and weight loss (up 4 lbs. since last visit).   Respiratory: Negative for cough, shortness of breath and wheezing.    Cardiovascular: Negative for chest pain, palpitations and leg swelling.   Gastrointestinal: Negative for blood in stool,  "constipation (Last BM this am), diarrhea, nausea and vomiting.   Genitourinary: Negative for dysuria and hematuria.   Musculoskeletal: Negative for myalgias.   Neurological: Positive for dizziness (sometimes with eye drops) and tremors (per family since stroke). Negative for tingling and headaches.   Endo/Heme/Allergies: Does not bruise/bleed easily.          Objective:     /60 (BP Location: Right arm, Patient Position: Sitting, BP Cuff Size: Adult)   Pulse 70   Temp 36.6 °C (97.8 °F) (Temporal)   Ht 1.702 m (5' 7\")   Wt 69.5 kg (153 lb 3.5 oz)   SpO2 95%   BMI 24.00 kg/m²        Physical Exam   Constitutional: He is oriented to person, place, and time. He appears well-developed and well-nourished. No distress.   fatigued   HENT:   Head: Normocephalic and atraumatic.   Eyes: EOM are normal. Right eye exhibits no discharge. Left eye exhibits no discharge. No scleral icterus.   Neck: Normal range of motion.   Cardiovascular: Normal rate, regular rhythm and normal heart sounds.  Exam reveals no gallop and no friction rub.    No murmur heard.  Pulmonary/Chest: Effort normal and breath sounds normal. No respiratory distress. He has no wheezes.   Abdominal: Soft. He exhibits no distension. There is no tenderness.   Musculoskeletal: Normal range of motion. He exhibits no edema.   Neurological: He is alert and oriented to person, place, and time.   Skin: Skin is warm and dry. No rash noted. He is not diaphoretic. No erythema. No pallor.   Psychiatric: He has a normal mood and affect. His behavior is normal.   Vitals reviewed.        Hospital Outpatient Visit on 03/22/2019   Component Date Value Ref Range Status   • D-Dimer Screen 03/22/2019 <0.40  0.00 - 0.50 ug/mL (FEU) Final    Comment: Effective 09/18/2018, please note new units of measure.  A negative D-Dimer result when combined with a clinical  assessment of low probability has been shown to have a high  negative predictive value for DVT or PE.  This assay " should not be used independently to exclude or  diagnose DVT or Pulmonary Embolism.  This test methodology does not differentiate between DVT and  PE when an elevation in results is demonstrated.     Hospital Outpatient Visit on 03/22/2019   Component Date Value Ref Range Status   • WBC 03/22/2019 6.2  4.8 - 10.8 K/uL Final   • RBC 03/22/2019 5.48  4.70 - 6.10 M/uL Final   • Hemoglobin 03/22/2019 16.7  14.0 - 18.0 g/dL Final   • Hematocrit 03/22/2019 51.4  42.0 - 52.0 % Final   • MCV 03/22/2019 93.8  81.4 - 97.8 fL Final   • MCH 03/22/2019 30.5  27.0 - 33.0 pg Final   • MCHC 03/22/2019 32.5* 33.7 - 35.3 g/dL Final   • RDW 03/22/2019 44.9  35.9 - 50.0 fL Final   • Platelet Count 03/22/2019 94* 164 - 446 K/uL Corrected    Comment: PLATELET COUNT FROM EDTA TUBE 94  CITTRATED PLATELET COUNT 43     • MPV 03/22/2019 11.5  9.0 - 12.9 fL Final   • Neutrophils-Polys 03/22/2019 38.60* 44.00 - 72.00 % Final   • Lymphocytes 03/22/2019 35.20  22.00 - 41.00 % Final   • Monocytes 03/22/2019 25.20* 0.00 - 13.40 % Final   • Eosinophils 03/22/2019 0.20  0.00 - 6.90 % Final   • Basophils 03/22/2019 0.50  0.00 - 1.80 % Final   • Immature Granulocytes 03/22/2019 0.30  0.00 - 0.90 % Final   • Nucleated RBC 03/22/2019 0.00  /100 WBC Final   • Neutrophils (Absolute) 03/22/2019 2.39  1.82 - 7.42 K/uL Final    Includes immature neutrophils, if present.   • Lymphs (Absolute) 03/22/2019 2.18  1.00 - 4.80 K/uL Final   • Monos (Absolute) 03/22/2019 1.56* 0.00 - 0.85 K/uL Final   • Eos (Absolute) 03/22/2019 0.01  0.00 - 0.51 K/uL Final   • Baso (Absolute) 03/22/2019 0.03  0.00 - 0.12 K/uL Final   • Immature Granulocytes (abs) 03/22/2019 0.02  0.00 - 0.11 K/uL Final   • NRBC (Absolute) 03/22/2019 0.00  K/uL Final                        Assessment/Plan:     1. Thrombocytopenia (HCC)  CBC WITH DIFFERENTIAL   2. Encounter for hematology follow-up         1.  Thrombocytopenia: Patient with history of intracranial bleed, hemorrhagic stroke.  He was  referred to Dr. Grey for workup of thrombocytopenia.  Will defer to Dr. Grey for review of factor VIII, von Willebrand's factor, additional workup results.  Review of previous recommendation from 2/27/19 visit indicates that if CBC shows worsening cytopenias the patient should be further worked up to include bone marrow biopsy as well as CT of abdomen, pelvis.    CBC was completed with EDTA and citrate tubes with EDTA showing platelet count of 94 and citrate showing platelet count of 43.  Patient is asymptomatic and without bleeding.  Presently patient will return in 6-8 weeks for reevaluation, sooner as needed.            The patient verbalized agreement and understanding of current plan. All questions and concerns were addressed at time of visit.    Please note that this dictation was created using voice recognition software. I have made every reasonable attempt to correct obvious errors, but I expect that there are errors of grammar and possibly content that I did not discover before finalizing the note.

## 2019-03-25 ENCOUNTER — SPEECH THERAPY (OUTPATIENT)
Dept: SPEECH THERAPY | Facility: REHABILITATION | Age: 84
End: 2019-03-25
Attending: FAMILY MEDICINE
Payer: MEDICARE

## 2019-03-25 DIAGNOSIS — I69.111 MEMORY DEFICIT FOLLOWING NONTRAUMATIC INTRACEREBRAL HEMORRHAGE: ICD-10-CM

## 2019-03-25 DIAGNOSIS — R41.840 COGNITIVE ATTENTION DEFICIT: ICD-10-CM

## 2019-03-25 PROCEDURE — 92507 TX SP LANG VOICE COMM INDIV: CPT

## 2019-03-25 ASSESSMENT — SOCIAL DETERMINANTS OF HEALTH (SDOH): SOCIAL_SUPPORT_SYSTEM: CHILDREN

## 2019-03-25 NOTE — OP THERAPY DAILY TREATMENT
"  Outpatient Speech Therapy  DAILY TREATMENT     [facility] Speech [address].  [address]  [place] [address]  Phone:  [phone]  Fax:  [phone]    Date: 03/25/2019    Patient: Elias Vincent  YOB: 1935  MRN: [id number]     Time Calculation  Start time: 0835  Stop time: 0930 Time Calculation (min): 55 minutes     Chief Complaint: Other (cognitive attention) and Poor Memory    Visit #: 9    Subjective:   Reason for Therapy:     Reason For Evaluation:  Cognition  Social Support:     Accompanied By:  Children (son, Ho, present and supportive)    Patient Mental Status:  Alert and Responsive  Additional Subjective Comments:      Patient with increased, appropriate sense of humor throughout session.  Patient noted to demonstrated improved processing speed with increased accuracy during completion of structured therapy tasks.  Patient reports \"I think I'm much better than I was\" referring to \"doing better at expressing what I want\" which patient reports \"was really weak after the stroke.\"       Objective:   Treatments/Interventions Performed:  Cognitive-Linguistic training, Patient/Caregiver education and Compensatory strategy training  Treatment Intervention tool(s) used:  Cognitive linguistic therapy targeted:  Memory recall including use of structured memory recall strategies, thought organization through unscrambling words to categories and sequencing multi-step activities of daily living, calculating time and reading a weekly calendar.          Speech Therapy Assessment:     Cognitive Linguistic Assessment:     Patient attention divided: WFL (1 error in 5 minutes given structured, visual cancellation activity)    Patient orientation to day: WFL    Patient orientation to month: WFL    Patient orientation to time: WFL    Patient orientation to self: WFL    Patient orientation to place: WFL    Patient simple reasoning ability: WFL (unscrambling words to category cues:  " completed to 90% accuracy independent)    Patient five step sequencing ability: WFL (6-step sequences:  completed to 91.7% accuracy independent)    Patient spontaneous clock drawing ability: WFL    Cognitive-Linguistic comments: Patient provided with education regarding short term memory store, including [facility] of [facility] memory strategy to increase recall of newly learned information.  Use of memory strategy noted to dramatically improve recall of 5 unrelated words, with patient completing to 2, 7 and 20 minute delays recalling 5/5 with 100% accuracy.  Without use of strategy, patient was independent in recall of 2/5 (40%) of items following 2, 5 minute delay.  Thought organization addressed through:  Calculating time using analogue time, completed to 100% accuracy, independent. Patient independent in noting error in time on clock and correcting, as necessary.  Reading a weekly calendar/ planner and answering questions completed to 100% accuracy independent.         Speech Therapy Plan :   Therapy Recommendations  Recommendation: Individual Speech Therapy,  Planned Therapy Interventions:  Home Program, Patient/Caregiver Education, Compensatory Strategy Training and Cognitive-Linguistic training,   Plan Details:  Continue with POC.  Activities addressing deductive reasoning sent home as part of home program.

## 2019-03-27 ENCOUNTER — APPOINTMENT (OUTPATIENT)
Dept: SPEECH THERAPY | Facility: REHABILITATION | Age: 84
End: 2019-03-27
Attending: FAMILY MEDICINE
Payer: MEDICARE

## 2019-03-28 ASSESSMENT — ENCOUNTER SYMPTOMS: BLOOD IN STOOL: 0

## 2019-04-01 ENCOUNTER — SPEECH THERAPY (OUTPATIENT)
Dept: SPEECH THERAPY | Facility: REHABILITATION | Age: 84
End: 2019-04-01
Attending: FAMILY MEDICINE
Payer: MEDICARE

## 2019-04-01 DIAGNOSIS — R41.840 COGNITIVE ATTENTION DEFICIT: ICD-10-CM

## 2019-04-01 DIAGNOSIS — I69.111 MEMORY DEFICIT FOLLOWING NONTRAUMATIC INTRACEREBRAL HEMORRHAGE: ICD-10-CM

## 2019-04-01 PROCEDURE — 92507 TX SP LANG VOICE COMM INDIV: CPT

## 2019-04-01 NOTE — OP THERAPY DAILY TREATMENT
"  Outpatient Speech Therapy  DAILY TREATMENT     [facility] Speech [address].  [address]  [place] [address]  Phone:  [phone]  Fax:  [phone]    Date: 04/01/2019    Patient: Elias Vincent  YOB: 1935  MRN: [id number]     Time Calculation  Start time: 1505  Stop time: 1600 Time Calculation (min): 55 minutes     Chief Complaint: Other (cognition) and Poor Memory    Visit #: 10    Subjective:   Reason for Therapy:     Reason For Evaluation:  Cognition  Social Support:     Accompanied By:  Spouse (wife, present and supportive)    Patient Mental Status:  Alert and Responsive  Progress Factors:     Progression:  Getting Better  Additional Subjective Comments:      Patient wife reports continued improvement in overall cognitive linguistic function characterized by increased independence in participation in everyday communication tasks. Patient in good spirits at start and throughout therapy session. Patient with noted improvements in processing speed and accuracy during today's session.       Objective:   Treatments/Interventions Performed:  Cognitive-Linguistic training, Patient/Caregiver education, Compensatory strategy training and Home program  Treatment Intervention tool(s) used:  Cognitive linguistic therapy targeted:  memory recall including review of memory strategies, executive functions through completion of checkbook ledger.          Speech Therapy Assessment:     Cognitive Linguistic Assessment:     Patient orientation to day: [facility]    Patient orientation to month: [facility]    Patient orientation to time: [facility]    Patient orientation to self: [facility]    Patient orientation to place: [facility]    Cognitive-Linguistic comments: Executive functions addressed through completion of checkbook ledger: patient completed to 100% accuracy independent.  Patient independent in double checking \"I'd better go over it now\" which is improved from previous sessions and positively impacted patient " accuracy in completion during structured activity. Patient with improved accuracy in completion of mental math activities with patient completing multi-digit subtraction (3-5 numbers) with 100% accuracy.  Mental manipulation targeted:  4 items related to word list retention: placement.  Compensatory memory strategy:  RWAVS reviewed with patient independent in stating parameters of each.  Patient completed structured activity to 7/10 = 70% accuracy using repetition memory strategy.  Short term/ time delay recall addressed:  4 unrelated items:  Use of association memory strategy resulted in 3/4 = 75% accuracy to 6 minute delay independent level.  Problem solving addressed:  Solving complex word problems related to time: initiated but not completed.  Patient to return as part of home program.         Speech Therapy Plan :   Therapy Recommendations  Recommendation: Individual Speech Therapy,  Planned Therapy Interventions:  Home Program, Patient/Caregiver Education, Compensatory Strategy Training and Cognitive-Linguistic training,   Plan Details:  Continue with POC.  Reinforce completion of home program addressing cognitive linguistic skills.

## 2019-04-08 ENCOUNTER — SPEECH THERAPY (OUTPATIENT)
Dept: SPEECH THERAPY | Facility: REHABILITATION | Age: 84
End: 2019-04-08
Attending: FAMILY MEDICINE
Payer: MEDICARE

## 2019-04-08 DIAGNOSIS — I69.111 MEMORY DEFICIT FOLLOWING NONTRAUMATIC INTRACEREBRAL HEMORRHAGE: ICD-10-CM

## 2019-04-08 DIAGNOSIS — R41.840 COGNITIVE ATTENTION DEFICIT: ICD-10-CM

## 2019-04-08 PROCEDURE — 92507 TX SP LANG VOICE COMM INDIV: CPT

## 2019-04-08 NOTE — OP THERAPY DAILY TREATMENT
"  Outpatient Speech Therapy  DAILY TREATMENT     [facility] Speech [address]  [address]  [place] [address]  Phone:  [phone]  Fax:  [phone]    Date: 04/08/2019    Patient: Elias Vincent  YOB: 1935  MRN: [id number]     Time Calculation  Start time: 1500  Stop time: 1600 Time Calculation (min): 60 minutes     Chief Complaint: Poor Memory    Visit #: 11    Subjective:   Reason for Therapy:     Reason For Evaluation:  Cognition (Memory/ problem solving)  Social Support:     Accompanied By:  Spouse (wife, present and supportive)    Patient Mental Status:  Alert and Responsive  Progress Factors:     Progression:  Getting Better  Additional Subjective Comments:      Patient reports improvements to cognitive linguistic skills; however, patient/ wife agree that patient continues to be \"better in the afternoon than the morning\" with patient noted to experience increased confusion at the start of each day. Patient/ wife report continued deficits in completion of structured memory tasks, including use of compensatory strategies for memory.       Objective:   Treatments/Interventions Performed:  Cognitive-Linguistic training, Patient/Caregiver education and Compensatory strategy training  Treatment Intervention tool(s) used:  Cognitive linguistic skill addressed through completion of: high level problem solving/ reasoning related to solving problems of time.  Short term/ time delay recall continued with continued education/ training regarding use of compensatory memory strategies.          Speech Therapy Assessment:     Cognitive Linguistic Assessment:     Cognitive-Linguistic comments: Cognitive linguistic skill targeted problem solving related to answering word problems related to time: patient completed with 90% accuracy to independent level.  Patient short term/ time delay recall addressed through remembering of 5 unrelated words given distraction and 5 minute time delay: " completed to 2/5 (40%) accuracy with no use of strategy with accuracy improved to 4/5 (80%) with use of write it down/ association/ linking items through story.     Patient was noted to demonstrate independence in use of double checking strategy to improve independence/ accuracy in completion of structured therapy activities.  Patient continues to use scratch paper as necessary to increase accuracy during structured cognitive tasks.         Speech Therapy Plan :   Prognosis & Recommendations  Impression Summary:  Patient verbalizing improved insight regarding need for compensatory memory strategies to increase accuracy in completion of structured, memory recall activities.   Therapy Recommendations  Recommendation:  Individual Speech Therapy,  Planned Therapy Interventions:  Home Program, Patient/Caregiver Education, Compensatory Strategy Training and Cognitive-Linguistic training,   Plan Details:  Continue with POC.  Use of memory journal for external memory strategies reinforced.  Patient provided with recommended cognitive exercise to address high level problem solving/ reasoning as part of home program.

## 2019-04-15 ENCOUNTER — SPEECH THERAPY (OUTPATIENT)
Dept: SPEECH THERAPY | Facility: REHABILITATION | Age: 84
End: 2019-04-15
Attending: FAMILY MEDICINE
Payer: MEDICARE

## 2019-04-15 DIAGNOSIS — R41.840 COGNITIVE ATTENTION DEFICIT: ICD-10-CM

## 2019-04-15 DIAGNOSIS — I69.111 MEMORY DEFICIT FOLLOWING NONTRAUMATIC INTRACEREBRAL HEMORRHAGE: ICD-10-CM

## 2019-04-15 PROCEDURE — 92507 TX SP LANG VOICE COMM INDIV: CPT

## 2019-04-15 NOTE — OP THERAPY PROGRESS SUMMARY
Outpatient Speech Therapy  PROGRESS NOTE      [facility] Speech [address]  [address]  [place] [address]  Phone:  [phone]  Fax:  [phone]    Date of Visit: 03/25/2019    Patient: Elias Vincent  YOB: 1935  MRN: [id number]     Referring Provider: Finn Cummins D.O.  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 Cognitive attention deficit R41.840       Subjective:   Reason for Therapy:     Reason For Evaluation:  Cognition    Onset Description:  Patient 83 year old male referred to speech therapy following participation in home health therapy service s/p Intracranial hemorrhage.    Therapy History:     Previous Treatments and Dates:  Patient has been participating in direct, skilled outpatient speech therapy addressing cognitive linguistic function since initial evaluation 2/15/2019.       Objective:   Treatments/Interventions Performed:  Home program, Patient/Caregiver education, Speech/Language treatment and Compensatory strategy training  Treatment Intervention tool(s) used:  Speech therapy has targeted cognitive linguistic skill with focus on short term memory recall with extensive education/ training regarding strategies to increase memory recall through participation in structured therapy sessions.       Speech Therapy Assessment:     Cognitive Linguistic Assessment:     Patient attention divided: WFL    Patient orientation to day: [facility]    Patient orientation to month:  [facility]    Patient orientation to time: [facility]    Patient orientation to self: [facility]    Patient orientation to place: [facility]    Patient recent and short term memory: Minimal    Patient prospective and time delay memory: Minimal    Patient complex reasoning ability: Minimal    Patient executive functioning ability: Minimal        Speech Therapy Plan :   Prognosis & Recommendations  Impression Summary:  Patient has demonstrated marked progress with direct, outpatient speech therapy services as evidenced by patient meeting 2/3 (66%) of short term goals during the initial 8 week treatment period. Patient has demonstrated improvements in attention which have positively impacted short term memory recall and performance during structured therapy tasks.  Despite progress, patient continues to present with deficits in cognitive linguistic skill necessitating participation in direct, skilled speech therapy intervention to address.   Goals  Short Term Goals:  NEW GOAL(S):  1. Patient will improve short term/ time delay recall through participation in mental manipulation tasks of 5 items and/or demonstrating independence in use of compensatory memory strategies (RWAVS; associating items through linking) completing with 88% accuracy independent level.  2.  Patient will improve executive function skills demonstrating independence in a variety of independent language related activities of daily living (e.g. Reading instructions; using a calendar; finance/ medication management) completing with 92% accuracy.   3.  Patient will improve complex problem solving, reasoning skill through completion of deductive reasoning tasks completing to 88% accuracy independent.   Patient progression on Short Term Goals:  1.  Patient will improve attention through completion of divided attention tasks completing with 92% accuracy to independent level; or demonstrating less than 2 errors in 5 minutes: GOAL MET.  2.  Patient will improve short term/ time delay  recall through participation in mental manipulation tasks of 4 progressing to 5 items and/or demonstrating independence in use of compensatory memory strategies (RWAVS; associating items through linking) completing with 88% accuracy independent level:  PARTIALLY MET.  Patient accurate in recall to 4 items demonstrating 90% or greater accuracy.  Patient with continued difficulty in recall of 5 items.  3.  Patient will improve executive function skills demonstrating independence in a variety of independent language related activities of daily living (e.g. Reading instructions; using a calendar; finance/ medication management) completing with 92% accuracy:  GOAL IN PROGRESS.  GOAL TO CONTINUE.     Patient progression on Long Term Goals:  1.  Patient will improve cognitive linguistic function with WITHIN NORMAL LIMITS as evidenced by performance on standardized assessment measures to increase accuracy and independence in completion of a variety of language related activities of daily living:  PROGRESSING.  CONTINUE.     Therapy Recommendations  Recommendation: Individual Speech Therapy,  Planned Therapy Interventions:  Home Program, Patient/Caregiver Education, Compensatory Strategy Training and Cognitive-Linguistic training,   Frequency:  1x week  Duration (in weeks):  8          Referring provider co-signature:  I have reviewed this plan of care and my co-signature certifies the need for services.  Certification Dates:   From 3/25.2019     To 5/20.2019    Physician Signature: ________________________________ Date: ______________

## 2019-04-15 NOTE — OP THERAPY DAILY TREATMENT
Outpatient Speech Therapy  DAILY TREATMENT     [facility] Speech [address]  [address]  [place] [address]  Phone:  [phone]  Fax:  [phone]    Date: 04/15/2019    Patient: Elias Vincent  YOB: 1935  MRN: [id number]     Time Calculation  Start time: 1505  Stop time: 1605 Time Calculation (min): 60 minutes     Chief Complaint: Other (cognition) and Poor Memory    Visit #: 12    Subjective:   Reason for Therapy:     Reason For Evaluation:  Cognition  Social Support:     Accompanied By:  Spouse (wife, present and supportive)    Patient Mental Status:  Responsive and Alert  Progress Factors:     Progression:  Getting Better      Objective:   Other Treatment Interventions:  MOCA  Treatment Intervention tool(s) used:  Cognitive linguistic skill was assessed through administration of the MOCA. Patient achieved a score of 25/30 which is below criterion score consistent with minimal cognitive linguistic deficits.    Objective Details:  Patient was noted to demonstrate continued deficits in completion of short term/ time delay recall with patient independent in recall of 1/5 (20%). Patient was also noted to demonstrate reduced accuracy in repetition of complex, sentence level stimuli.           Speech Therapy Assessment:     Cognitive Linguistic Assessment:     Cognitive-Linguistic comments: Patient reports that his memory is improving for every day activities and recalled event over the weekend in which patient was independent in recall of long term memory.  Patient continues to rely on strategies as educated/ trained during speech therapy sessions, such as RWAVS, to increase recall of newly learned information.  Patient would likely benefit from participation in formal assessment of cognitive linguistic function to determine continuation of outpatient speech therapy services.         Speech Therapy Plan :   Therapy Recommendations  Recommendation:  Cognitive Linguistic  Evaluation and Individual Speech Therapy,  Planned Therapy Interventions:  Home Program, Patient/Caregiver Education, Compensatory Strategy Training and Cognitive-Linguistic training,   Plan Details:  Continue with POC.  Formal evaluation of cognitive linguistic function recommended.

## 2019-04-22 ENCOUNTER — SPEECH THERAPY (OUTPATIENT)
Dept: SPEECH THERAPY | Facility: REHABILITATION | Age: 84
End: 2019-04-22
Attending: FAMILY MEDICINE
Payer: MEDICARE

## 2019-04-22 DIAGNOSIS — I69.111 MEMORY DEFICIT FOLLOWING NONTRAUMATIC INTRACEREBRAL HEMORRHAGE: ICD-10-CM

## 2019-04-22 DIAGNOSIS — R41.840 COGNITIVE ATTENTION DEFICIT: ICD-10-CM

## 2019-04-22 PROCEDURE — 92507 TX SP LANG VOICE COMM INDIV: CPT

## 2019-04-22 NOTE — OP THERAPY DAILY TREATMENT
Outpatient Speech Therapy  DAILY TREATMENT     [facility] Speech [address].  [address]  [place] [address]  Phone:  [phone]  Fax:  [phone]    Date: 04/22/2019    Patient: Elias Vincent  YOB: 1935  MRN: [id number]     Time Calculation  Start time: 1515  Stop time: 1615 Time Calculation (min): 60 minutes     Chief Complaint: Other (Cognitive linguistic)    Visit #: 13    Subjective:   Reason for Therapy:     Reason For Evaluation:  Cognition  Social Support:     Accompanied By:  Spouse (wife, present and supportive)    Patient Mental Status:  Alert and Responsive  Progress Factors:     Progression:  Getting Better  Additional Subjective Comments:      No recent changes noted or reported to medical history. Patient notes increased difficulties in memory recall during completion of home program.       Objective:   Treatments/Interventions Performed:  Cognitive-Linguistic training, Patient/Caregiver education and Compensatory strategy training  Other Treatment Interventions:  Cognitive Linguistic Quick Test (CLQT)  Treatment Intervention tool(s) used:  The Cognitive Linguistic Quick Test (CLQT) was re-administered to assess for patient progress with skilled speech therapy intervention with regards to cognitive linguistic function. Patient achieved the following Cognitive domain score(s)/ Severity rating(s):  Attention (196) WITHIN NORMAL LIMITS. Memory (142) WITHIN NORMAL LIMITS. Executive Functions (34) WITHIN NORMAL LIMITS. Language (29) WITHIN NORMAL LIMITS. Visuospatial skills (94) WITHIN NORMAL LIMITS.     Cognitive domain scores were combined to determine an overall severity rating: patient achieved a score of 4.0 consistent with WITHIN NORMAL LIMITS.    Clock drawing scored 13 which is WITHIN NORMAL LIMITS.   Objective Details:  Results of re-assessment of cognitive linguistic function revealed that patient presented with significant improvement in all areas  targeted as part of outpatient speech therapy. Patient improved from MILD deficits in areas of attention, memory, executive functions and language to demonstrating a score of within normal limits.  Patient also demonstrated marked improvement in clock drawing task improving from moderate deficits at start of therapy to WNL.          Speech Therapy Assessment:     Cognitive Linguistic Assessment:     Portions of the Other (Cognitive Linguistic Quick Test (CLQT)) are used.    Patient attention sustained: Health system    Patient attention selective: Health system    Patient attention divided: Health system    Patient orientation to day: Health system    Patient orientation to month: Health system    Patient orientation to time: Health system    Patient orientation to self: Health system    Patient orientation to place: Health system    Patient immediate memory: Health system    Patient recent and short term memory: Minimal    Patient remote and long term memory: Minimal    Patient procedural memory: Health system    Patient prospective and time delay memory: Minimal    Patient biographical information memory: Health system    Patient executive functioning ability: Health system    Patient spontaneous clock drawing ability: Health system        Speech Therapy Plan :   Prognosis & Recommendations  Impression Summary:  Results of re-assessment of cognitive linguistic function revealed that patient has demonstrated marked progress in cognitive linguistic function through participation in structured speech therapy tasks. Patient has likely reached optimal level function for cognitive linguistic skill at this time with discharge from outpatient speech therapy services recommended. Patient/ wife encouraged to contact outpatient speech therapy in 2-4 months if deficits in short term memory persist or should other concerns regarding cognitive linguistic function arise.   Therapy Recommendations  Recommendation:  No further speech therapy indicated at this time,  Planned Therapy Interventions:  Home Program,   Plan Details:  Continue with strategies to  address memory in home setting as educated/ trained as part of speech therapy sessions including use of external visual aids (calendar, personal , memory journal).

## 2019-04-23 ENCOUNTER — TELEPHONE (OUTPATIENT)
Dept: HEMATOLOGY ONCOLOGY | Facility: MEDICAL CENTER | Age: 84
End: 2019-04-23

## 2019-04-23 ENCOUNTER — OFFICE VISIT (OUTPATIENT)
Dept: NEUROLOGY | Facility: MEDICAL CENTER | Age: 84
End: 2019-04-23
Payer: MEDICARE

## 2019-04-23 VITALS
HEART RATE: 62 BPM | SYSTOLIC BLOOD PRESSURE: 100 MMHG | DIASTOLIC BLOOD PRESSURE: 58 MMHG | BODY MASS INDEX: 23.7 KG/M2 | OXYGEN SATURATION: 94 % | WEIGHT: 151 LBS | TEMPERATURE: 97.9 F | HEIGHT: 67 IN

## 2019-04-23 DIAGNOSIS — D69.6 THROMBOCYTOPENIA (HCC): ICD-10-CM

## 2019-04-23 DIAGNOSIS — I62.9 INTRACRANIAL HEMORRHAGE (HCC): ICD-10-CM

## 2019-04-23 DIAGNOSIS — R56.9 SEIZURE (HCC): ICD-10-CM

## 2019-04-23 PROCEDURE — 99214 OFFICE O/P EST MOD 30 MIN: CPT | Performed by: PSYCHIATRY & NEUROLOGY

## 2019-04-23 NOTE — ASSESSMENT & PLAN NOTE
"Mr. Vincent was started on Keppra after a \"confusional event\" during his hospitalization. He was switched to Depakote after Keppra caused a serious rash - now recovered. Given EEG did not show any epileptiform discharges, he was tapered off of the Depakote and has not had any additional episodes concerning for seizures.  I recommended he stay off antiepileptics for now, and we can reinstate his driving privileges as he is now 90 days from his previous event.    Plan:  1.  Okay to drive.  2. He will remain off of anti-seizure medications at this time.   "

## 2019-04-23 NOTE — PROGRESS NOTES
CC: Stroke/TIA      HPI:    Elias Vincent is an 83 y.o. male who presents today in neurologic follow up for left temporal intraparenchymal hemorrhagic stroke which he sustained on 12/31/18.  His platelet counts at that time were found to be low.  During his hospitalization he also had a confusional event concerning for seizure.    At his last visit, we tapered him off of Depakote.  He has not had any additional episodes concerning for seizure activity.  Overall, he and his wife feel he has improved incrementally, although has decreased stamina.  He has seen Dr. Grey, of Hematology/Oncology for his thrombocytopenia.  They have been trending his platelet counts which have remained quite low.  He has a follow-up appointment upcoming on May 16.    He has driven with his wife in the car short distances and feels confident in his performance.  He denies any problems with his vision or reaction time.  His wife does not have any concerns.  He is more than 90 days since his previous confusional episode.        ROS:   A comprehensive review of systems was performed and was negative except for that mentioned in the HPI.    Past Medical History:   Past Medical History:   Diagnosis Date   • Intracranial hemorrhage (HCC) 12/31/2018   • Basal cell adenocarcinoma    • Blood clot in vein     Left arm - had to have his rib removed.    • Hyperlipidemia    • Melanoma (HCC)    • Peritonitis (HCC)     Ruptured appendix in the 1960s       Past Surgical History:   Past Surgical History:   Procedure Laterality Date   • APPENDECTOMY     • CHOLECYSTECTOMY     • RIB RESECTION  1970s    Removed due to a blood clot in his shoulder       Social History:   Social History     Social History   • Marital status:      Spouse name: N/A   • Number of children: N/A   • Years of education: N/A     Occupational History   • Not on file.     Social History Main Topics   • Smoking status: Never Smoker   • Smokeless tobacco: Never Used   • Alcohol  use No   • Drug use: No   • Sexual activity: Yes     Other Topics Concern   • Not on file     Social History Narrative   • No narrative on file       Family Hx:   Family History   Problem Relation Age of Onset   • Heart Disease Mother    • Cancer Father         Brain   • Cancer Sister    • Heart Disease Brother    • Stroke Sister    • No Known Problems Son    • No Known Problems Son    • No Known Problems Son    • No Known Problems Son    • No Known Problems Daughter    • No Known Problems Daughter    • No Known Problems Daughter    • Dementia Maternal Aunt        Current Medications:   Current Outpatient Prescriptions:   •  Omega-3 Fatty Acids (OMEGA-3 FISH OIL PO), Take 1 Tab by mouth every day., Disp: , Rfl:   •  Flaxseed, Linseed, (FLAXSEED OIL PO), Take 1 Tab by mouth every day., Disp: , Rfl:   •  multivitamin (THERAGRAN) Tab, Take 1 Tab by mouth every day., Disp: , Rfl:   •  atorvastatin (LIPITOR) 10 MG Tab, Take 10 mg by mouth every day., Disp: , Rfl:   •  LATANOPROST OP, 1 Drop by Ophthalmic route every day at 6 PM., Disp: , Rfl:   •  omeprazole (PRILOSEC) 20 MG CPDR, Take 20 mg by mouth every day., Disp: , Rfl:   •  LORazepam (ATIVAN) 1 MG Tab, Take 1 mg by mouth every 8 hours as needed for Anxiety., Disp: , Rfl:   •  acetaminophen (TYLENOL) 500 MG Tab, Take 500-1,000 mg by mouth every 6 hours as needed for Mild Pain or Moderate Pain., Disp: , Rfl:     Allergies:   Allergies   Allergen Reactions   • Levetiracetam Rash     itchy rash all over body   • Oxcarbazepine Anxiety         Physical Exam:   Vitals:    04/23/19 1118   BP: 100/58   Pulse: 62   Temp: 36.6 °C (97.9 °F)   SpO2: 94%       Constitutional: Well-developed, well-nourished, good hygiene. Appears stated age.  Cardiovascular: RRR, with no murmurs, rubs or gallops. No carotid bruits. No peripheral edema, pedal pulses intact.   Respiratory: Lungs CTA B/L, no W/R/R.   Skin: Warm, dry, intact. No rashes observed.  Eyes: Eyes conjugate. Sclera  anicteric.   Neurologic:   Mental Status: Awake, alert.   Speech: Fluent with normal prosody.   Memory: Difficulty remembering date and details of his history.    Concentration: Attentive. Able to focus on history and follow multi-step commands.   Fund of Knowledge: Appropriate.   Cranial Nerves:    CN II: PERRL. No afferent pupillary defect.    CN III, IV, VI: Good eye closure, EOMI.     CN V: Facial sensation intact and symmetric.     CN VII: No facial asymmetry.     CN VIII: Hearing intact.     CN IX and X: Palate elevates symmetrically. Normal gag reflex.    CN XI: Symmetric shoulder shrug.     CN XII: Tongue midline.    Sensory: Intact light touch, vibration and temperature.    Coordination: No evidence of past-pointing on finger to nose testing, no dysdiadochokinesia. Heel to shin intact.    DTR's: 2+ throughout without clonus.    Babinski: Toes downgoing bilaterally.   Romberg: Negative.   Movements: No tremors observed.   Musculoskeletal:    Strength: 5/5 in upper and lower extremities bilaterally.   Gait: Steady, narrow based. Able to tandem walk.    Tone: Normal bulk and tone.   Joints: No swelling.         Labs Reviewed:  Results for OTTO MIDDLETON (MRN [id number]) as of 4/23/2019 16:52   Ref. Range 3/22/2019 14:48   WBC Latest Ref Range: 4.8 - 10.8 K/uL 6.2   RBC Latest Ref Range: 4.70 - 6.10 M/uL 5.48   Hemoglobin Latest Ref Range: 14.0 - 18.0 g/dL 16.7   Hematocrit Latest Ref Range: 42.0 - 52.0 % 51.4   MCV Latest Ref Range: 81.4 - 97.8 fL 93.8   MCH Latest Ref Range: 27.0 - 33.0 pg 30.5   MCHC Latest Ref Range: 33.7 - 35.3 g/dL 32.5 (L)   RDW Latest Ref Range: 35.9 - 50.0 fL 44.9   Platelet Count Latest Ref Range: 164 - 446 K/uL 94 (L)   MPV Latest Ref Range: 9.0 - 12.9 fL 11.5   Neutrophils-Polys Latest Ref Range: 44.00 - 72.00 % 38.60 (L)   Neutrophils (Absolute) Latest Ref Range: 1.82 - 7.42 K/uL 2.39   Lymphocytes Latest Ref Range: 22.00 - 41.00 % 35.20   Lymphs (Absolute) Latest Ref Range: 1.00  - 4.80 K/uL 2.18   Monocytes Latest Ref Range: 0.00 - 13.40 % 25.20 (H)   Monos (Absolute) Latest Ref Range: 0.00 - 0.85 K/uL 1.56 (H)   Eosinophils Latest Ref Range: 0.00 - 6.90 % 0.20   Eos (Absolute) Latest Ref Range: 0.00 - 0.51 K/uL 0.01   Basophils Latest Ref Range: 0.00 - 1.80 % 0.50   Baso (Absolute) Latest Ref Range: 0.00 - 0.12 K/uL 0.03   Immature Granulocytes Latest Ref Range: 0.00 - 0.90 % 0.30   Immature Granulocytes (abs) Latest Ref Range: 0.00 - 0.11 K/uL 0.02   Nucleated RBC Latest Units: /100 WBC 0.00   NRBC (Absolute) Latest Units: K/uL 0.00           Imaging reviewed:  Today I reviewed the patient's most recent MRI images with him in the examination room. I explained basic terminology of MRI's, verbalized my assessment, and answered his questions.       MRI Brain w/o contrast from 12/31/18  1.  Grossly stable size of LEFT temporal parenchymal hematoma. No associated enhancement to suggest underlying mass or vascular malformation  2.  New intraventricular extension of blood without hydrocephalus  3.  Foci of remote microhemorrhage in the RIGHT temporal lobe and RIGHT cerebellar hemisphere, possibly related to hypertension or amyloid angiopathy  4.  Mild-to-moderate atrophy  5.  Mild white matter changes        Assessment/Plan:    Intracranial hemorrhage (HCC)  Mr. Vincent is an 82 yo M with pmhx of right arm venous blood clot in the 1970's treated surgically, melanoma and basal cell skin cancer s/p removal with no previous evidence of metastases, chronically low platelets of unknown etiology, who had a left temporal parenchymal hemorrhagic stroke on 12/31/18 with intraventricular extension. There were multiple punctate foci of increased GRE signal in the right temporal lobe and right cerebellar hemispheres concerning for possible underlying cerebral amyloid angiopathy although hypertension could cause such changes.     He remains clinically stable and improving since his hemorrhage.  No new imaging  "indicated at this time.  Today we discussed secondary stroke prevention.    Plan discussed today:  1. Avoid blood thinners and anti-platelet medications  2. Maintain blood pressure <140/90  3.  He will continue to follow-up with Dr. Grey regarding his thrombocytopenia  4.  Counseled him on the importance of healthy diet  5.  Encouraged moderate physical activity-30 minutes a day  6.  Encouraged him to reduce his stress levels suggested cognitive behavioral therapy    Thrombocytopenia (HCC)  He will continue to follow-up with Dr. Grey.     Seizure (HCC)  Mr. Vincent was started on Keppra after a \"confusional event\" during his hospitalization. He was switched to Depakote after Keppra caused a serious rash - now recovered. Given EEG did not show any epileptiform discharges, he was tapered off of the Depakote and has not had any additional episodes concerning for seizures.  I recommended he stay off antiepileptics for now, and we can reinstate his driving privileges as he is now 90 days from his previous event.    Plan:  1.  Okay to drive.  2. He will remain off of anti-seizure medications at this time.       Yelitza Story D.O., M.P.H  MS specialist.   Board Certified Neurologist.  Neurology Clerkship Director, [facility].    Neurology,  [facility].   Tel: [phone]  Fax: [phone]    "

## 2019-04-23 NOTE — TELEPHONE ENCOUNTER
Patient called to would like to know if he needs to complete labs prior to his next appointment. With Dr Grey

## 2019-04-23 NOTE — OP THERAPY DISCHARGE SUMMARY
Outpatient Speech Therapy  DISCHARGE SUMMARY NOTE      [facility] Speech [address]  [address]  [place] [address]  Phone:  [phone]  Fax:  [phone]    Date of Visit: 04/22/2019    Patient: Elias Vincent  YOB: 1935  MRN: [id number]     Referring Provider: Finn Cummins D.O.  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   Therapy History:     Previous Treatments and Dates:  Patient participated in direct, skilled outpatient speech therapy addressing cognitive linguistic deficits in areas of attention, memory, executive functions and memory since initial evaluation 2/15/2019.       Objective:   Treatments/Interventions Performed:  Cognitive-Linguistic training, Home program and Compensatory strategy training  Other Treatment Interventions:  Re-administration of the Cognitive Linguistic Quick Test (CLQT)  Treatment Intervention tool(s) used:  Speech therapy targeted cognitive linguistic function through participation in structured therapy activities and home program with focus on improving patient cognitive linguistic function in areas of complex, divided attention, short term memory recall, including understanding and use of compensatory memory strategies, executive functions, including complex, deductive reasoning/ problem solving and expressive-receptive language  skills.      The Cognitive Linguistic Quick Test (CLQT) was re-administered to assess for patient progress with skilled speech therapy intervention with regards to cognitive linguistic function. Patient achieved the following Cognitive domain score(s)/ Severity rating(s):  Attention (196) WITHIN NORMAL LIMITS. Memory (142) WITHIN NORMAL LIMITS. Executive Functions (34) WITHIN NORMAL LIMITS. Language (29) WITHIN NORMAL LIMITS. Visuospatial skills (94) WITHIN NORMAL LIMITS.      Cognitive domain scores were combined to determine an overall severity rating: patient achieved a score of 4.0 consistent with WITHIN NORMAL LIMITS.     Clock drawing scored 13 which is WITHIN NORMAL LIMITS.     Objective Details:  Results of re-assessment of cognitive linguistic function revealed that patient presented with significant improvement in all areas targeted as part of outpatient speech therapy. Patient improved from MILD deficits in areas of attention, memory, executive functions and language to demonstrating a score of within normal limits.  Patient also demonstrated marked improvement in clock drawing task improving from moderate deficits at start of therapy to WNL.         Speech Therapy Assessment:     Cognitive Linguistic Assessment:     Portions of the Other (Cognitive Linguistic Quick Test (CLQT)) are used.    Patient attention sustained: [facility]    Patient attention selective: [facility]    Patient attention divided: WFL    Patient orientation to day: WFL    Patient orientation to month: WFL    Patient orientation to time: [facility]    Patient orientation to self: [facility]    Patient orientation to place: WFL    Patient immediate memory: WFL    Patient recent and short term memory: Minimal    Patient remote and long term memory: Minimal    Patient procedural memory: WFL    Patient prospective and time delay memory: Minimal    Patient biographical information memory: WFL    Patient executive functioning ability: [facility]    Patient spontaneous clock  drawing ability: WFL        Speech Therapy Plan :   Prognosis & Recommendations  Impression Summary: Results of re-assessment of cognitive linguistic function revealed that patient has demonstrated marked progress in cognitive linguistic function through participation in structured speech therapy tasks. Patient has likely reached optimal level function for cognitive linguistic skill at this time with discharge from outpatient speech therapy services recommended.   Patient progression on Short Term Goals:  1. Patient will improve short term/ time delay recall through participation in mental manipulation tasks of 5 items and/or demonstrating independence in use of compensatory memory strategies (RWAVS; associating items through linking) completing with 88% accuracy independent level:  PARTIALLY MET/ GOAL NOT MET.  Patient accurately in short term memory recall of 5 related/ unrelated items to 80% accuracy requiring min verbal prompts/ assistance/ cues to use compensatory memory strategies as educated/ trained during speech therapy sessions.  2.  Patient will improve executive function skills demonstrating independence in a variety of independent language related activities of daily living (e.g. Reading instructions; using a calendar; finance/ medication management) completing with 92% accuracy:  GOAL MET.   3.  Patient will improve complex problem solving, reasoning skill through completion of deductive reasoning tasks completing to 88% accuracy independent: GOAL MET.   Patient progression on Long Term Goals:  1.  Patient will improve cognitive linguistic function with WITHIN NORMAL LIMITS as evidenced by performance on standardized assessment measures to increase accuracy and independence in completion of a variety of language related activities of daily living:  GOAL MET per results of formal assessment of cognitive linguistic function through administration of CLQT 4/22/2019.   Therapy  Recommendations  Recommendation:  No further speech therapy indicated at this time, Patient/ wife encouraged to contact outpatient speech therapy in 2-4 months if deficits in short term memory persist or should other concerns regarding cognitive linguistic function arise.     Planned Therapy Interventions:  Home Program,   Plan Details:  Continue with strategies to address memory in home setting as educated/ trained as part of speech therapy sessions including use of external visual aids (calendar, personal , memory journal).

## 2019-04-23 NOTE — ASSESSMENT & PLAN NOTE
Mr. 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 There were multiple punctate foci of increased GRE signal in the right temporal lobe and right cerebellar hemispheres concerning for possible underlying cerebral amyloid angiopathy although hypertension could cause such changes.     He remains clinically stable and improving since his hemorrhage.  No new imaging indicated at this time.  Today we discussed secondary stroke prevention.    Plan discussed today:  1. Avoid blood thinners and anti-platelet medications  2. Maintain blood pressure <140/90  3.  He will continue to follow-up with Dr. Grey regarding his thrombocytopenia  4.  Counseled him on the importance of healthy diet  5.  Encouraged moderate physical activity-30 minutes a day  6.  Encouraged him to reduce his stress levels suggested cognitive behavioral therapy

## 2019-04-24 NOTE — TELEPHONE ENCOUNTER
Patient Wife called stated that she called yesterday and she would like for a Rn to return her call.She just wants to make sure that we do extra labs for him. Since he had stroke on Dec 31,2018.     thank you

## 2019-04-25 NOTE — TELEPHONE ENCOUNTER
RN spoke with wife informed her a CBC will need to be completed prior to her husbands appt in May with DR. Grey. RN explained this can be performed at and [facility] and done at least a day prior to his appt.

## 2019-05-15 ENCOUNTER — HOSPITAL ENCOUNTER (OUTPATIENT)
Dept: LAB | Facility: MEDICAL CENTER | Age: 84
End: 2019-05-15
Attending: INTERNAL MEDICINE
Payer: MEDICARE

## 2019-05-15 DIAGNOSIS — D69.6 THROMBOCYTOPENIA (HCC): ICD-10-CM

## 2019-05-15 LAB
BASOPHILS # BLD AUTO: 0.2 % (ref 0–1.8)
BASOPHILS # BLD: 0.01 K/UL (ref 0–0.12)
EOSINOPHIL # BLD AUTO: 0.01 K/UL (ref 0–0.51)
EOSINOPHIL NFR BLD: 0.2 % (ref 0–6.9)
ERYTHROCYTE [DISTWIDTH] IN BLOOD BY AUTOMATED COUNT: 42.4 FL (ref 35.9–50)
HCT VFR BLD AUTO: 50.8 % (ref 42–52)
HGB BLD-MCNC: 16.6 G/DL (ref 14–18)
IMM GRANULOCYTES # BLD AUTO: 0.04 K/UL (ref 0–0.11)
IMM GRANULOCYTES NFR BLD AUTO: 0.7 % (ref 0–0.9)
LYMPHOCYTES # BLD AUTO: 1.91 K/UL (ref 1–4.8)
LYMPHOCYTES NFR BLD: 35.8 % (ref 22–41)
MCH RBC QN AUTO: 30.2 PG (ref 27–33)
MCHC RBC AUTO-ENTMCNC: 32.7 G/DL (ref 33.7–35.3)
MCV RBC AUTO: 92.4 FL (ref 81.4–97.8)
MONOCYTES # BLD AUTO: 1.39 K/UL (ref 0–0.85)
MONOCYTES NFR BLD AUTO: 26 % (ref 0–13.4)
NEUTROPHILS # BLD AUTO: 1.98 K/UL (ref 1.82–7.42)
NEUTROPHILS NFR BLD: 37.1 % (ref 44–72)
NRBC # BLD AUTO: 0 K/UL
NRBC BLD-RTO: 0 /100 WBC
PLATELET # BLD AUTO: 109 K/UL (ref 164–446)
PMV BLD AUTO: 10.2 FL (ref 9–12.9)
RBC # BLD AUTO: 5.5 M/UL (ref 4.7–6.1)
WBC # BLD AUTO: 5.3 K/UL (ref 4.8–10.8)

## 2019-05-15 PROCEDURE — 85025 COMPLETE CBC W/AUTO DIFF WBC: CPT

## 2019-05-15 PROCEDURE — 36415 COLL VENOUS BLD VENIPUNCTURE: CPT

## 2019-05-16 ENCOUNTER — OFFICE VISIT (OUTPATIENT)
Dept: HEMATOLOGY ONCOLOGY | Facility: MEDICAL CENTER | Age: 84
End: 2019-05-16
Payer: MEDICARE

## 2019-05-16 ENCOUNTER — HOSPITAL ENCOUNTER (OUTPATIENT)
Dept: LAB | Facility: MEDICAL CENTER | Age: 84
End: 2019-05-16
Attending: INTERNAL MEDICINE
Payer: MEDICARE

## 2019-05-16 VITALS
DIASTOLIC BLOOD PRESSURE: 64 MMHG | TEMPERATURE: 98.6 F | WEIGHT: 154.1 LBS | SYSTOLIC BLOOD PRESSURE: 112 MMHG | OXYGEN SATURATION: 95 % | HEIGHT: 69 IN | BODY MASS INDEX: 22.82 KG/M2 | RESPIRATION RATE: 16 BRPM | HEART RATE: 70 BPM

## 2019-05-16 DIAGNOSIS — D69.6 THROMBOCYTOPENIA (HCC): ICD-10-CM

## 2019-05-16 DIAGNOSIS — D72.820 MONOCLONAL B-CELL LYMPHOCYTOSIS OF UNDETERMINED SIGNIFICANCE: ICD-10-CM

## 2019-05-16 PROCEDURE — 36415 COLL VENOUS BLD VENIPUNCTURE: CPT

## 2019-05-16 PROCEDURE — 99214 OFFICE O/P EST MOD 30 MIN: CPT | Performed by: INTERNAL MEDICINE

## 2019-05-16 ASSESSMENT — PAIN SCALES - GENERAL: PAINLEVEL: NO PAIN

## 2019-05-18 LAB
ACUTE LEUKEMIA MARKERS SPEC-IMP: NORMAL
CD1 CELLS NFR SPEC: <1 %
CD10 CELLS NFR SPEC: <1 %
CD11B CELLS NFR SPEC: 5 %
CD13 CELLS NFR SPEC: 2 %
CD16 CELLS NFR SPEC: 20 %
CD19 CELLS NFR SPEC: 3 %
CD2 CELLS NFR SPEC: 91 %
CD20 CELLS NFR SPEC: 2 %
CD200 LEUKLYMP PHENO Q5833: 3 %
CD22 CELLS NFR SPEC: 3 %
CD23 CELLS NFR SPEC: 4 %
CD3 CELLS NFR SPEC: 84 %
CD33 CELLS NFR SPEC: 5 %
CD34 CELLS NFR SPEC: <1 %
CD38 CELLS NFR SPEC: 1 %
CD4 LEUKLYMP PHENO Q5783: 44 %
CD45 CELLS NFR SPEC: NORMAL %
CD5 CELLS NFR SPEC: 83 %
CD56 CELLS NFR SPEC: 16 %
CD57 CELLS NFR SPEC: 38 %
CD64 CELLS NFR SPEC: 5 %
CD7 CELLS NFR SPEC: 84 %
CD8 LEUKLYMP PHENO Q5786: 43 %
CYTOKAP LEUKLYM PHENO Q5807: NORMAL %
CYTOLAM LEUKLYM PHENO Q5808: NORMAL %
EVENTS COUNTED SPEC: 28 MARKERS
LYMPHS KAPPA/LYMPH NFR SPEC: NORMAL %
LYMPHS LAMBDA/LYMPH NFR SPEC: NORMAL %
SOURCE 9121: NORMAL
TCR G-D CELLS NFR SPEC: 8 %

## 2019-05-20 PROBLEM — D72.820 MONOCLONAL B-CELL LYMPHOCYTOSIS OF UNDETERMINED SIGNIFICANCE: Status: ACTIVE | Noted: 2019-05-20

## 2019-05-21 ENCOUNTER — TELEPHONE (OUTPATIENT)
Dept: HEMATOLOGY ONCOLOGY | Facility: MEDICAL CENTER | Age: 84
End: 2019-05-21

## 2019-05-21 NOTE — TELEPHONE ENCOUNTER
At the request of Dr. Grey I personally spoke with Dr. Cummins, patient's primary care provider.  He wanted to touch base with Dr. Grey with regards to patient's recent labs.  I did review with the PCP Dr. Grey's recommendations for continued monitoring through PCP.  Incidentally was found to have CLL.  Dr. Grey is not concerned about monocytosis but the flow was more indicative of leukocytosis.  However patient's white blood cell count is stable and no need for further follow-up with hematology unless white blood cell count becomes significantly elevated.  Also according to Dr. Grey patient's platelet counts have been stable, around 100,000.  I did speak with the primary care provider letting him know that no need for hematology involvement unless platelets drop below 60,000.  Dr. Cummins was appreciative of the phone call.  I did inform him that this was all noted in the consult visit with Dr. Grey on Thursday, May 16.  We have faxed over his note to the primary care provider's office for him to review.

## 2019-05-21 NOTE — TELEPHONE ENCOUNTER
Lorna from Dr. Cummins called she stated that her Doctor needs to speak to Dr. Grey and does not know what it is in regards to.     Dr. Cummins may be contacted at [phone]

## 2019-06-06 ENCOUNTER — TELEPHONE (OUTPATIENT)
Dept: HEMATOLOGY ONCOLOGY | Facility: MEDICAL CENTER | Age: 84
End: 2019-06-06

## 2019-06-06 NOTE — TELEPHONE ENCOUNTER
Spoke with the patient's wife, she called because they received the letter regarding Dr Matilda Forte. She wanted to know if the patient needed to find a new Hematologist, I informed her that per Dr Grey's last office visit note, the patient was dismissed from the clinic and could schedule all follow up labs with his PCP. She expressed understanding, and stated they would call the office if there is a need in the future.

## 2019-06-13 ENCOUNTER — NON-PROVIDER VISIT (OUTPATIENT)
Dept: NEUROLOGY | Facility: MEDICAL CENTER | Age: 84
End: 2019-06-13
Payer: MEDICARE

## 2019-06-13 DIAGNOSIS — D69.1 THROMBOCYTOPATHIA (HCC): ICD-10-CM

## 2019-06-13 DIAGNOSIS — R56.9 SEIZURE (HCC): ICD-10-CM

## 2019-06-13 PROCEDURE — 95951 EEG: CPT | Mod: 52 | Performed by: PSYCHIATRY & NEUROLOGY

## 2019-06-13 NOTE — PROCEDURES
VIDEO ELECTROENCEPHALOGRAM REPORT      Referring provider: Dr. Story.     DOS: 6/13/2019  (total recording of 24 minutes).     INDICATION:  Elias Vincent 83 y.o. male presenting with history of stroke, altered mental status.     CURRENT ANTIEPILEPTIC REGIMEN: None.    TECHNIQUE: 30 channel video electroencephalogram (EEG) was performed in accordance with the international 10-20 system. The study was reviewed in bipolar and referential montages. The recording examined the patient during wakeful and drowsy/sleep state(s).     DESCRIPTION OF THE RECORD:  During the wakefulness, the background showed a symmetrical 9 Hz alpha activity posteriorly with amplitude of 70 mV.  There was reactivity to eye closure/opening.  A normal anterior-posterior gradient was noted with faster beta frequencies seen anteriorly.  During drowsiness, theta/delta frequencies were seen.    During the sleep state, background shows diffuse high-amplitude 4-5 Hz delta activity.  Symmetrical high-amplitude sleep spindles and vertex sharps were seen in the leads over the central regions.     ACTIVATION PROCEDURES:   Intermittent Photic stimulation was performed in a stepwise fashion from 1 to 30 Hz and elicited a normal response (photic driving), most noticeable in the posterior leads.    ICTAL AND/OR INTERICTAL FINDINGS:   No focal or generalized epileptiform activity noted. No regional slowing was seen during this routine study.  No clinical events or seizures were reported or recorded during the study.     EKG: sampling of the EKG recording demonstrated sinus rhythm.     EVENTS:  None.     INTERPRETATION:  This is a normal video EEG recording in the awake, drowsy, and sleep state(s).  Clinical correlation is recommended.    Note: A normal EEG does not rule out epilepsy.  If the clinical suspicion remains high for seizures, a prolonged recording to capture clinical or subclinical events may be helpful.        London Smith MD    Epilepsy and Neurodiagnostics.   Clinical  of Neurology [facility].   Diplomate in Neurology, Epilepsy, and Electrodiagnostic Medicine.   Office: [phone]  Fax: [phone]

## 2019-06-20 ENCOUNTER — TELEPHONE (OUTPATIENT)
Dept: NEUROLOGY | Facility: MEDICAL CENTER | Age: 84
End: 2019-06-20

## 2019-06-20 NOTE — TELEPHONE ENCOUNTER
[phone]  Pt wife called requesting EEG results. Advised I will add them to the list when schedule becomes available to schedule a follow up.   Also would like to have EEG forwarded to dr Cummins

## 2019-06-24 ENCOUNTER — OFFICE VISIT (OUTPATIENT)
Dept: NEUROLOGY | Facility: MEDICAL CENTER | Age: 84
End: 2019-06-24
Payer: MEDICARE

## 2019-06-24 VITALS
SYSTOLIC BLOOD PRESSURE: 112 MMHG | HEART RATE: 65 BPM | OXYGEN SATURATION: 93 % | BODY MASS INDEX: 24.17 KG/M2 | TEMPERATURE: 98.1 F | HEIGHT: 67 IN | DIASTOLIC BLOOD PRESSURE: 62 MMHG | WEIGHT: 154 LBS

## 2019-06-24 DIAGNOSIS — R56.9 SEIZURE (HCC): ICD-10-CM

## 2019-06-24 DIAGNOSIS — I62.9 INTRACRANIAL HEMORRHAGE (HCC): ICD-10-CM

## 2019-06-24 PROCEDURE — 99214 OFFICE O/P EST MOD 30 MIN: CPT | Performed by: PSYCHIATRY & NEUROLOGY

## 2019-06-24 RX ORDER — SERTRALINE HYDROCHLORIDE 25 MG/1
25 TABLET, FILM COATED ORAL DAILY
COMMUNITY
End: 2023-04-11

## 2019-06-24 ASSESSMENT — ENCOUNTER SYMPTOMS
CHILLS: 0
NAUSEA: 0
DEPRESSION: 0
BLURRED VISION: 0
NERVOUS/ANXIOUS: 0
SEIZURES: 0
VOMITING: 0
FEVER: 0
SHORTNESS OF BREATH: 0

## 2019-06-24 NOTE — PROGRESS NOTES
CC: Stroke and seizure    HPI:   Mr. Vincent is an 83-year-old male who presents today in neurologic follow-up for left temporal lobe hemorrhagic stroke on December 31, 2018, and seizure.  He is again accompanied by his wife to today's visit.  He underwent routine outpatient EEG on June 13, 2019 which did not show any epileptiform discharges.  He had tapered off of his Depakote, and since that time has not had any additional seizure events or confusional episodes similar to that which he had in the hospital concerning for seizure.  He has started Zoloft for depression, although he reports not feeling very depressed.    He was being seen by hematologist Dr. Grey for his long-standing and explained thrombocytopenia, but he decided to go into private practice.  They have not followed up with hematology because of this.     His wife mentions that he has had less stamina, but it seems to be incrementally improving since his stroke.    Review of Systems   Constitutional: Negative for chills and fever.   HENT: Negative for hearing loss.    Eyes: Negative for blurred vision.   Respiratory: Negative for shortness of breath.    Cardiovascular: Negative for chest pain.   Gastrointestinal: Negative for nausea and vomiting.   Genitourinary: Negative for frequency.   Skin: Negative for rash.   Neurological: Negative for seizures.   Psychiatric/Behavioral: Negative for depression. The patient is not nervous/anxious.    All other review of systems were normal.      Current Outpatient Prescriptions:   •  sertraline (ZOLOFT) 25 MG tablet, Take 25 mg by mouth every day., Disp: , Rfl:   •  LORazepam (ATIVAN) 1 MG Tab, Take 1 mg by mouth every 8 hours as needed for Anxiety., Disp: , Rfl:   •  acetaminophen (TYLENOL) 500 MG Tab, Take 500-1,000 mg by mouth every 6 hours as needed for Mild Pain or Moderate Pain., Disp: , Rfl:   •  Omega-3 Fatty Acids (OMEGA-3 FISH OIL PO), Take 1 Tab by mouth every day., Disp: , Rfl:   •  Flaxseed,  Linseed, (FLAXSEED OIL PO), Take 1 Tab by mouth every day., Disp: , Rfl:   •  multivitamin (THERAGRAN) Tab, Take 1 Tab by mouth every day., Disp: , Rfl:   •  atorvastatin (LIPITOR) 10 MG Tab, Take 10 mg by mouth every day., Disp: , Rfl:   •  LATANOPROST OP, 1 Drop by Ophthalmic route every day at 6 PM., Disp: , Rfl:   •  omeprazole (PRILOSEC) 20 MG CPDR, Take 20 mg by mouth every day., Disp: , Rfl:       EEG from 6/13/2019  INTERPRETATION:  This is a normal video EEG recording in the awake, drowsy, and   sleep state(s).  Clinical correlation is recommended.      Vitals:    06/24/19 1441   BP: 112/62   Pulse: 65   Temp: 36.7 °C (98.1 °F)   SpO2: 93%     Constitutional: Well-developed, well-nourished, good hygiene. Appears stated age.  Cardiovascular: RRR, with no murmurs, rubs or gallops. No carotid bruits.   Respiratory: Normal respiratory effort.  Skin: Warm, dry, intact. No rashes observed.  Eyes: Sclera anicteric. No nystagmus observed.   Neurologic:   Mental Status: Awake and alert.    Speech: Speech is fluent with normal prosody.   .           Fund of Knowledge: Appropriate   Cranial Nerves:    CN II: Pupils are equal and react appropriately. No APD noted.    CN III, IV, VI: Good eye closure. Extraocular movements are intact.     CN V: Facial sensation is intact and symmetric.     CN VII: No evidence of facial droop.     CN VIII: Hearing is grossly intact.    CN IX and X: Palate elevates symmetrically.    CN XI: Shoulder shrug is symmetric.     CN XII: Tongue is midline.   Sensory: Sensation is intact to vibration and temperature.    Coordination: There is no discoordination detected with finger tapping or finger to nose testing.        DTR's: Reflexes are 2+ and symmetrical.   Babinski: Toes are downgoing bilaterally.    Romberg: Deferred.   Movements: No tremors noted.  Musculoskeletal:    Strength:   Deltoids      R 5/5 L 5/5  Biceps        R 5/5 L 5/5  Triceps       R 5/5 L 5/5  Wrist Ext    R 5/5 L  5/5  Finger Flex R 5/5 L 5/5  Finger Ext   R 5/5 L 5/5  Hip Flex      R 5/5 L 5/5  Knee Flex   R 5/5 L 5/5  Knee Ext    R 5/5 L 5/5  Ankle DF    R 5/5 L 5/5  Ankle PF    R 5/5 L 5/5   Gait: Gait is narrow based and steady.    Tone: Normal bulk and tone.    Joints: No joint swelling.   Psychiatric: Mood and affect are appropriate.       Assessment and Plan:     Intracranial hemorrhage (HCC)  Mr. Vincent is an 83-year-old male with past medical history of right arm venous blood clot in the 1970s treated surgically, melanoma and basal cell skin cancer status post removal with no previous evidence of metastases, chronic thrombocytopenia of unknown etiology, who sustained a left temporal intraparenchymal hemorrhage on December 31, 2018 with intraventricular extension.  On his MRI imaging there were multiple punctate foci of increased GRE signal in the right temporal lobe and right cerebellar hemispheres concerning for possible underlying cerebral amyloid angiopathy although hypertension can cause such changes.  We again discussed his case, and the underlying etiology.  I encouraged him to maintain a relationship with his primary care provider Dr. Cummins in order to keep his blood pressures adequately controlled.  I also recommended the call for follow-up with hematology in case it is recommended for him to have bone marrow biopsy.  We again discussed his exercise recommendations.  I would not place any major restrictions on him at this time.  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    Plan:  1.  Avoid blood thinners and antiplatelet medications.  2.  Maintain blood pressure less than 140/90.  3.  They will call [facility] for follow-up appointment.  4.  They can follow-up with me on an as-needed basis.        Seizure (HCC)  Mr. Vincent had a confusional event during his hospitalization for which she was initially started on Keppra.  This was discontinued due to a  serious rash, and he was switched to Depakote.  He has not had any additional seizure events since tapering off of Depakote, and his recent EEG did not show any evidence of epileptiform discharges.  Today we discussed that while it is still possible that he may have an additional seizure event, the likelihood is low since he is gone more than 90 days seizure-free which is the time required by state law.        Yelitza Story D.O., M.P.H  MS specialist.   Board Certified Neurologist.  Neurology Clerkship Director, [facility].    Neurology,  [facility].   Tel: [phone]  Fax: [phone]

## 2019-06-24 NOTE — ASSESSMENT & PLAN NOTE
Mr. Vincent had a confusional event during his hospitalization for which she was initially started on Keppra.  This was discontinued due to a serious rash, and he was switched to Depakote.  He has not had any additional seizure events since tapering off of Depakote, and his recent EEG did not show any evidence of epileptiform discharges.  Today we discussed that while it is still possible that he may have an additional seizure event, the likelihood is low since he is gone more than 90 days seizure-free which is the time required by state law.   No abnormalities

## 2019-06-24 NOTE — ASSESSMENT & PLAN NOTE
Mr. Vincent is an 83-year-old male with past medical history of right arm venous blood clot in the 1970s treated surgically, melanoma and basal cell skin cancer status post removal with no previous evidence of metastases, chronic thrombocytopenia of unknown etiology, who sustained a left temporal intraparenchymal hemorrhage on December 31, 2018 with intraventricular extension.  On his MRI imaging there were multiple punctate foci of increased GRE signal in the right temporal lobe and right cerebellar hemispheres concerning for possible underlying cerebral amyloid angiopathy although hypertension can cause such changes.  We again discussed his case, and the underlying etiology.  I encouraged him to maintain a relationship with his primary care provider Dr. Cummins in order to keep his blood pressures adequately controlled.  I also recommended the call for follow-up with hematology in case it is recommended for him to have bone marrow biopsy.  We again discussed his exercise recommendations.  I would not place any major restrictions on him at this time.  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    Plan:  1.  Avoid blood thinners and antiplatelet medications.  2.  Maintain blood pressure less than 140/90.  3.  They will call [facility] for follow-up appointment.  4.  They can follow-up with me on an as-needed basis.

## 2019-06-25 ENCOUNTER — TELEPHONE (OUTPATIENT)
Dept: HEMATOLOGY ONCOLOGY | Facility: MEDICAL CENTER | Age: 84
End: 2019-06-25

## 2019-06-25 NOTE — TELEPHONE ENCOUNTER
Dr. Cummins's office called to see who the patient is to follow up with. I informed the caller Anh PORTER spoke with Dr. Cummins on May 21st and informed him there is no need for hematology follow up unless the platelet count drops below 60,000. I informed her at this time we do not have a permanent hematologist/oncologist. She states the patient may need follow up in regards to Chronic Lymphocytic Leukemia. She will speak to Dr. Cummins and call back if necessary.

## 2020-05-26 ENCOUNTER — HOSPITAL ENCOUNTER (OUTPATIENT)
Dept: RADIOLOGY | Facility: MEDICAL CENTER | Age: 85
End: 2020-05-26
Attending: INTERNAL MEDICINE
Payer: MEDICARE

## 2020-05-26 DIAGNOSIS — D69.6 THROMBOCYTOPENIA, UNSPECIFIED (HCC): ICD-10-CM

## 2020-05-26 PROCEDURE — 76700 US EXAM ABDOM COMPLETE: CPT

## 2021-01-15 DIAGNOSIS — Z23 NEED FOR VACCINATION: ICD-10-CM

## 2021-01-26 ENCOUNTER — IMMUNIZATION (OUTPATIENT)
Dept: FAMILY PLANNING/WOMEN'S HEALTH CLINIC | Facility: IMMUNIZATION CENTER | Age: 86
End: 2021-01-26
Attending: INTERNAL MEDICINE
Payer: MEDICARE

## 2021-01-26 DIAGNOSIS — Z23 ENCOUNTER FOR VACCINATION: Primary | ICD-10-CM

## 2021-01-26 DIAGNOSIS — Z23 NEED FOR VACCINATION: ICD-10-CM

## 2021-01-26 PROCEDURE — 91300 PFIZER SARS-COV-2 VACCINE: CPT

## 2021-01-26 PROCEDURE — 0001A PFIZER SARS-COV-2 VACCINE: CPT

## 2021-02-18 ENCOUNTER — IMMUNIZATION (OUTPATIENT)
Dept: FAMILY PLANNING/WOMEN'S HEALTH CLINIC | Facility: IMMUNIZATION CENTER | Age: 86
End: 2021-02-18
Attending: INTERNAL MEDICINE
Payer: MEDICARE

## 2021-02-18 DIAGNOSIS — Z23 ENCOUNTER FOR VACCINATION: Primary | ICD-10-CM

## 2021-02-18 PROCEDURE — 91300 PFIZER SARS-COV-2 VACCINE: CPT

## 2021-02-18 PROCEDURE — 0002A PFIZER SARS-COV-2 VACCINE: CPT

## 2021-04-07 NOTE — ED NOTES
Admitting MD at bedside.  
Blood sent to lab per MD orders.  IVF infusing, see MAR.    
EEG tech at bedside.    
ERP at bedside.  
MD at bedside.  
Med rec complete per [facility].  Allergies reviewed  
Med rec partially complete per pt at bedside  Interviewed pt with family at bedside with permission from pt  Allergies reviewed and updated.      
Neuro assessment remains unchanged with some word searching.  Family at bedside, patient resting on gurney, respirations even and unlabored.  
Patient A&O, VSS, some difficulty word finding, NIH stroke scale 1.  Patient able to make needs known, respirations even and unlabored, wife at bedside, NAD noted, resting on gurney.  
Patient assessment remains unchanged, resting on gurney, respirations even and unlabored.    
Patient returned from MRI.  Dr. Hood, Neurosurgeon at bedside.  
Patient sleeping on gurney, respirations even and unlabored, NAD noted.  Patient alert to voice, neuro check completed.  Report to [facility].  
Patient sleeping on gurney, respirations even and unlabored.  
Patient to CT.  
Patient to R3 from CT.  
Patient transported to imaging  
Pt in MRI w/ RN on monitor.  
Report given to Rubén RINALDI bedside. Pt taken to SICU on monitor. Chart and belongings with patient. No acute signs distress present.   
Report received from Carmela RINALDI.   
Urine sent to lab.  
n/a

## 2022-11-10 ENCOUNTER — PATIENT MESSAGE (OUTPATIENT)
Dept: HEALTH INFORMATION MANAGEMENT | Facility: OTHER | Age: 87
End: 2022-11-10

## 2023-04-04 ENCOUNTER — TELEPHONE (OUTPATIENT)
Dept: MEDICAL GROUP | Facility: LAB | Age: 88
End: 2023-04-04
Payer: MEDICARE

## 2023-04-04 RX ORDER — TAMSULOSIN HYDROCHLORIDE 0.4 MG/1
0.4 CAPSULE ORAL
COMMUNITY
End: 2023-04-11 | Stop reason: SDUPTHER

## 2023-04-04 RX ORDER — PENICILLIN V POTASSIUM 500 MG/1
TABLET ORAL
COMMUNITY
Start: 2023-01-17 | End: 2023-04-11

## 2023-04-04 NOTE — TELEPHONE ENCOUNTER
NEW PATIENT VISIT PRE-VISIT PLANNING    1.  [employer] Patient is checked in Patient Demographics?Yes    2.  Immunizations were updated in Epic using Reconcile Outside Information activity? Yes         3.  Is this appointment scheduled as a Hospital Follow-Up? No    4.  Patient is due for the following Health Maintenance Topics:   Health Maintenance Due   Topic Date Due    Annual Wellness Visit  Never done    IMM DTaP/Tdap/Td Vaccine (1 - Tdap) Never done    IMM ZOSTER VACCINES (1 of 2) Never done   5.  Reviewed/Updated the following with patient:          Preferred Pharmacy? Yes          Preferred Lab? Yes          Preferred Communication? Yes          Allergies? Yes          Medications? YES. Was Abstract Encounter opened and chart updated? YES    6.  Updated Care Team?          DME Company (gait device, O2, CPAP, etc.) N\A          Other Specialists (eye doctor, derm, GYN, cardiology, endo, etc): YES    7.  AHA (Puls[employer]) form printed for Provider? N/A

## 2023-04-11 ENCOUNTER — OFFICE VISIT (OUTPATIENT)
Dept: MEDICAL GROUP | Facility: LAB | Age: 88
End: 2023-04-11
Payer: MEDICARE

## 2023-04-11 VITALS
SYSTOLIC BLOOD PRESSURE: 120 MMHG | TEMPERATURE: 97 F | RESPIRATION RATE: 12 BRPM | OXYGEN SATURATION: 92 % | DIASTOLIC BLOOD PRESSURE: 74 MMHG | HEIGHT: 67 IN | HEART RATE: 58 BPM | WEIGHT: 162 LBS | BODY MASS INDEX: 25.43 KG/M2

## 2023-04-11 DIAGNOSIS — Z76.89 ENCOUNTER TO ESTABLISH CARE: ICD-10-CM

## 2023-04-11 DIAGNOSIS — I69.320 APHASIA AS LATE EFFECT OF CEREBROVASCULAR ACCIDENT: ICD-10-CM

## 2023-04-11 DIAGNOSIS — N40.0 BENIGN PROSTATIC HYPERPLASIA, UNSPECIFIED WHETHER LOWER URINARY TRACT SYMPTOMS PRESENT: ICD-10-CM

## 2023-04-11 PROCEDURE — 99204 OFFICE O/P NEW MOD 45 MIN: CPT | Performed by: INTERNAL MEDICINE

## 2023-04-11 RX ORDER — TAMSULOSIN HYDROCHLORIDE 0.4 MG/1
0.4 CAPSULE ORAL
Qty: 90 CAPSULE | Refills: 3 | Status: SHIPPED | OUTPATIENT
Start: 2023-04-11 | End: 2023-06-28 | Stop reason: SDUPTHER

## 2023-04-11 ASSESSMENT — PATIENT HEALTH QUESTIONNAIRE - PHQ9: CLINICAL INTERPRETATION OF PHQ2 SCORE: 0

## 2023-04-12 NOTE — PROGRESS NOTES
CC: Elias Vincent is a 87 y.o. male is suffering from   Chief Complaint   Patient presents with    Establish Care         SUBJECTIVE:  1. 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  Patient is coping relatively well, has the assistance of a very supportive wife    2. Benign prostatic hyperplasia, unspecified whether lower urinary tract symptoms present  History of benign prostatic hyperplasia continue tamsulosin    3. 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, retired dentist  [place]  Social History     Tobacco Use    Smoking status: Never    Smokeless tobacco: Never   Substance Use Topics    Alcohol use: No    Drug use: No         MEDICATIONS:    Current Outpatient Medications:     tamsulosin (FLOMAX) 0.4 MG capsule, Take 1 Capsule by mouth 1/2 hour after breakfast., Disp: 90 Capsule, Rfl: 3    Omega-3 Fatty Acids (OMEGA-3 FISH OIL PO), Take 1 Tab by mouth every day., Disp: , Rfl:     Flaxseed, Linseed, (FLAXSEED OIL PO), Take 1 Tab by mouth every day., Disp: , Rfl:     atorvastatin (LIPITOR) 10 MG Tab, Take 10 mg by mouth every day., Disp: , Rfl:     LATANOPROST OP, 1 Drop by Ophthalmic route every day at 6 PM., Disp: , Rfl:     omeprazole (PRILOSEC) 20 MG delayed-release capsule, Take 20 mg by mouth every day., Disp: , Rfl:     multivitamin (THERAGRAN) Tab, Take 1 Tab by mouth every day., Disp: , Rfl:     PROBLEMS:  Patient Active Problem List    Diagnosis Date Noted    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  Heart: No chest pain.  Lungs:  No shortness of Breath.  Psychological: Juan unusual Anxiety depression     PHYSICAL EXAM   Constitutional: Alert, cooperative, not in acute distress.  Cardiovascular:  Rate Rhythm is regular without murmurs rubs clicks.     Thorax & Lungs: Clear to auscultation, no wheezing, rhonchi, or rales  HENT: Normocephalic, Atraumatic.  Eyes: PERRLA, EOMI, Conjunctiva normal.   Neck: Trachia is midline no swelling of the thyroid.   Lymphatic: No lymphadenopathy noted.   Abdomin: Soft non-tender, no rebound, no guarding.   Skin: Warm, Dry, No erythema, No rash.   Extremities: Atraumatic with symmetric distal pulses, No edema, No tenderness, No cyanosis, No clubbing.   Musculoskeletal: Was all extremities without difficulty  Neurologic: Alert cooperative, cranial nerves II through XII are intact, Normal motor function, Normal sensory function, stated difficulty with short-term memory  Psychiatric: Affect normal, Judgment normal, Mood normal.     VITAL SIGNS:/74   Pulse (!) 58   Temp 36.1 °C (97 °F) (Temporal)   Resp 12   Ht 1.702 m (5' 7\")   Wt 73.5 kg (162 lb)   SpO2 92%   BMI 25.37 kg/m²     Labs: Reviewed    Assessment:                                                     Plan:    1. Encounter to establish care  Medically stable Labs ordered  - Lipid Profile; Future  - Comp Metabolic Panel; Future  - CBC WITH DIFFERENTIAL; Future  - Referral to Neurology  - VITAMIN B12; Future  - Referral to Dermatology    2. Benign prostatic hyperplasia, unspecified whether lower urinary tract symptoms present  Continue Flomax  - tamsulosin (FLOMAX) 0.4 MG capsule; Take 1 Capsule by mouth 1/2 hour after breakfast.  Dispense: 90 Capsule; Refill: 3  - Lipid Profile; Future  - Comp Metabolic Panel; Future  - CBC WITH DIFFERENTIAL; Future  - Referral to Neurology  - VITAMIN B12; Future  - Referral to Dermatology    3. " Aphasia as late effect of cerebrovascular accident  Referral written to neurology Labs ordered, concerns about possible/likely dementia.  - Lipid Profile; Future  - Comp Metabolic Panel; Future  - CBC WITH DIFFERENTIAL; Future  - Referral to Neurology  - VITAMIN B12; Future  - Referral to Dermatology

## 2023-06-12 ENCOUNTER — OFFICE VISIT (OUTPATIENT)
Dept: NEUROLOGY | Facility: MEDICAL CENTER | Age: 88
End: 2023-06-12
Attending: PSYCHIATRY & NEUROLOGY
Payer: MEDICARE

## 2023-06-12 ENCOUNTER — TELEPHONE (OUTPATIENT)
Dept: NEUROLOGY | Facility: MEDICAL CENTER | Age: 88
End: 2023-06-12
Payer: MEDICARE

## 2023-06-12 VITALS
DIASTOLIC BLOOD PRESSURE: 64 MMHG | TEMPERATURE: 97.6 F | SYSTOLIC BLOOD PRESSURE: 118 MMHG | BODY MASS INDEX: 25.02 KG/M2 | HEART RATE: 56 BPM | WEIGHT: 159.39 LBS | RESPIRATION RATE: 12 BRPM | OXYGEN SATURATION: 94 % | HEIGHT: 67 IN

## 2023-06-12 DIAGNOSIS — I62.9 INTRACRANIAL HEMORRHAGE (HCC): ICD-10-CM

## 2023-06-12 DIAGNOSIS — I69.120 APHASIA DUE TO OLD INTRACEREBRAL HEMORRHAGE: ICD-10-CM

## 2023-06-12 PROCEDURE — 3074F SYST BP LT 130 MM HG: CPT | Performed by: PSYCHIATRY & NEUROLOGY

## 2023-06-12 PROCEDURE — 3078F DIAST BP <80 MM HG: CPT | Performed by: PSYCHIATRY & NEUROLOGY

## 2023-06-12 PROCEDURE — 99205 OFFICE O/P NEW HI 60 MIN: CPT | Performed by: PSYCHIATRY & NEUROLOGY

## 2023-06-12 ASSESSMENT — ENCOUNTER SYMPTOMS
LOSS OF CONSCIOUSNESS: 0
INSOMNIA: 0
FOCAL WEAKNESS: 0
HEMOPTYSIS: 0
BLOOD IN STOOL: 0
MEMORY LOSS: 1
SPEECH CHANGE: 1
DEPRESSION: 0
HALLUCINATIONS: 0
BRUISES/BLEEDS EASILY: 0
SEIZURES: 0
FALLS: 0

## 2023-06-12 NOTE — TELEPHONE ENCOUNTER
NEUROLOGY PATIENT PRE-VISIT PLANNING     Patient was NOT contacted to complete PVP.    Patient Appointment is scheduled as: New Patient     Is visit type and length scheduled correctly? Yes    [facility] Patient Demographics? Yes    3.   Is referral attached to visit? Yes    4. Were records received from referring provider? Yes, referring provider is a [facility] provider so records are in Epic.     4. Patient was NOT contacted to have someone accompany them to visit.     5. Is this appointment scheduled as a Hospital Follow-Up?  No    6. Does the patient require any pre procedure or post procedure follow up? No    7. If any orders were placed at last visit or intended to be done for this visit do we have Results/Consult Notes? Yes  Labs - Labs were not ordered at last office visit.  Imaging - Imaging was not ordered at last office visit.  Referrals - No referrals were ordered at last office visit.    8. If patient appointment is for Botox - is order pended for provider? N/A

## 2023-06-13 ENCOUNTER — APPOINTMENT (OUTPATIENT)
Dept: NEUROLOGY | Facility: MEDICAL CENTER | Age: 88
End: 2023-06-13
Attending: STUDENT IN AN ORGANIZED HEALTH CARE EDUCATION/TRAINING PROGRAM
Payer: MEDICARE

## 2023-06-13 NOTE — PROGRESS NOTES
Subjective     Elias Vincent is a 87 y.o. male who presents with his wife Evelin, from the office of Dr. 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     HPI    Mr. Vincent's hemorrhage started incidentally back in December, 2018.  He was in his usual state of health at the time.  He presented with a sudden onset of aphasia.  During his hospitalization he suffered from several episodes of acute confusion and worsening language deficit, and though his EEG was unremarkable, he was presumptively treated with Keppra.  Left medial temporal hemorrhage was seen on imaging, vascular imaging studies revealed no evidence of aneurysm, AVM or vascular process.  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  Was also found to be dealing with thrombocytopenia which was chronic in nature.  He was seen in this office by Dr. Story in February, and finally in June, 2019.    His outpatient history was remarkable for the fact that Keppra caused a rash, he was placed on Depakote though both the patient and his wife felt that his balance was unsteady, cognitive functions might have worsened slightly.  EEG was normal after they had stopped the Depakote, he has remained off medication, he has had no further episodes of sudden confusion, worsening language deficits, or right-sided focal motor or sensory distortions consistent with seizures.  He has been followed by Dr. Javan Keenan MD, from an hematologic standpoint.  Platelets have consistently been low, though he has never had issues with a bleeding diathesis.    Repeat imaging of the brain has not been done.  There have been no  follow-up EEG studies.  With his final evaluation by Dr. Story, it was recommended that he stay off all antiplatelet agents, NSAIDs, as well as, obviously, anticoagulants.    He continues to have problems with language, both he and Evelin notes that it has to do with difficulty with naming.  He also has short-term memory curtailment, he will lose his train of thought easily, even with cueing and reminders, it is very difficult for him to get back on track.  Details of recent events are much more likely to escape him.  At times he will also slur his speech.  Multitasking is slower.    Still his organization at home is maintained.  They have created some routine and structure which always helps though it has not been critical.  He is still consistent with his medicines, participates in their finances, his organizations around the house with his own personal space is maintained.    Evelin has noted that his personality has changed, he has become much more easily irritated, though circumstantially appropriate, the intensity has escalated.  He is not hallucinating.  He is not having major issues with sleep hygiene.  Walking is stable, he has had no problems with loss of smell, tremor, changes of bowel or bladder function, he remains independent with his ADL and IADL activities.    Other than the above, he does have a history of resection of several multiple myeloma lesions, glaucoma, dyslipidemia, BPH and reflux disease.  There is no history of diabetes, hypertension, liver or kidney disease, seizure, MS, neurodegenerative disease, pulmonary disease, psychiatric disease or autoimmune disease.  There is no surgical history of note from my standpoint.    His mother  when she was over 100.  His father  at a younger age with a brain tumor.  The sister evidently has suffered from TIAs.  He is not aware of anyone in the immediate family who has ever suffered from dementia or bleeding disorder.    He does not smoke or  "drink.  He was an avid  though has had to give that up since the hemorrhage.  He would love to drive but Evelin has taken that sure over on her own, voluntarily.    He is on vitamin D with calcium, Prilosec, omega-3 fish oil, Flomax and Lipitor.    Review of Systems   Constitutional:  Negative for malaise/fatigue.   HENT:  Negative for nosebleeds.    Respiratory:  Negative for hemoptysis.    Gastrointestinal:  Negative for blood in stool and melena.   Genitourinary:  Negative for hematuria.   Musculoskeletal:  Negative for falls.   Neurological:  Positive for speech change. Negative for focal weakness, seizures and loss of consciousness.   Endo/Heme/Allergies:  Does not bruise/bleed easily.   Psychiatric/Behavioral:  Positive for memory loss. Negative for depression and hallucinations. The patient does not have insomnia.    All other systems reviewed and are negative.    Objective     /64 (BP Location: Right arm, Patient Position: Sitting, BP Cuff Size: Adult)   Pulse (!) 56   Temp 36.4 °C (97.6 °F) (Temporal)   Resp 12   Ht 1.702 m (5' 7\")   Wt 72.3 kg (159 lb 6.3 oz)   SpO2 94%   BMI 24.96 kg/m²      Physical Exam    He appears in no acute distress.  Clean and appropriately dressed, he is quite cooperative.  Vital signs are stable, his pulse is a little low at 56, his rhythm is regular.  There is no malar rash, jaw or temporal tenderness, there is no jaw claudication.  The neck is supple, range of motion is full, carotid pulses are present without asymmetry.  Cardiac evaluation reveals a regular rhythm.  There is no evidence of diffuse bruising.     Neurological Exam    He is fully oriented.  There is some hesitancy with word finding, resulting in some slight reduction in fluency.  Paraphasic errors are not used.  He has some difficulty following multistep verbal commands, he does much better reciting visual cues and following written commands.  He has some difficulty with abstract " "thinking doing comparisons and similarities.  He also has some notable difficulty spelling the word \"world\" backwards, though with corrections on his own he does get it done.  He repeats, there is no apraxia or inattention.  He is also redundant, at times perseverative about his age and his difficulty with naming.    PERRLA/EOMI, visual fields are full, facial movements are symmetric, sensory exam is intact to temperature and light touch bilaterally.  There is no extinction.  The tongue and uvula are midline, there is no bulbar dysfunction.  Jaw movements are intact.  Shoulder shrug and head rotation are normal.    Musculoskeletal exam reveals normal tone bilaterally, there is no tremor, asterixis, or drift.  Strength is intact in all 4 extremities.  Reflexes are present throughout without asymmetries.  The toes are downgoing.  Ankle jerks are diminished bilaterally.    He stands easily, gait is normal and station and stride length, there is some mild apraxia when he attempts tandem walking though he does eventually get there on his own.  There is no appendicular dystaxia with any of the extremities.  Repetitive movements are normal and symmetric in amplitude and frequency with all 4 extremities.    Sensory exam is intact to vibration and temperature, Romberg is absent.    Assessment & Plan     1. Intracranial hemorrhage (HCC)  I would like to repeat an imaging study of the brain as well as an MRA of the cerebrovascular bed to make sure we are not missing a progression of a process like CAA.  This condition can be associated with a subcortical dementia.  If things have changed for the worse, we can at least have a more meaningful discussion about prognosis, potential complications, medications to avoid, etc.  For now, he was encouraged to remain active, both physically, mentally and socially.  We will follow-up in the next 6 months.  We talked about signs and symptoms suggestive of intracranial hemorrhage and what " to do in the circumstances.    - MR-BRAIN-W/O; Future  - MR-MRA HEAD-W/O; Future    2. 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  Separate issue, I am a little concerned about some of the language difficulties that he is having now, especially looking at the location and size of the original hemorrhage, which is mesial temporal on the left, affecting some of the sylvian fissure but the difficulties he is having suggest both frontal as well as arcuate fasciculus involvement.  Again, imaging of the brain will be necessary, but I would like to get neuropsychological testing done to get a better picture of whether or not a more global pattern of cognitive impairment is developing.    - Referral to Neurology    Time: 70 minutes in total spent on patient care including pre-charting, record review, discussion with healthcare staff and documentation.  This includes face-to-face time for exam, review, discussion, as well as counseling and coordinating care.

## 2023-06-23 LAB
ALBUMIN SERPL-MCNC: 4.9 G/DL (ref 3.6–4.6)
ALBUMIN/GLOB SERPL: 2.3 {RATIO} (ref 1.2–2.2)
ALP SERPL-CCNC: 55 IU/L (ref 44–121)
ALT SERPL-CCNC: 24 IU/L (ref 0–44)
AST SERPL-CCNC: 12 IU/L (ref 0–40)
BASOPHILS # BLD AUTO: 0 X10E3/UL (ref 0–0.2)
BASOPHILS NFR BLD AUTO: 0 %
BILIRUB SERPL-MCNC: 2.2 MG/DL (ref 0–1.2)
BUN SERPL-MCNC: 20 MG/DL (ref 8–27)
BUN/CREAT SERPL: 20 (ref 10–24)
CALCIUM SERPL-MCNC: 9.4 MG/DL (ref 8.6–10.2)
CHLORIDE SERPL-SCNC: 105 MMOL/L (ref 96–106)
CHOLEST SERPL-MCNC: 98 MG/DL (ref 100–199)
CO2 SERPL-SCNC: 24 MMOL/L (ref 20–29)
CREAT SERPL-MCNC: 0.98 MG/DL (ref 0.76–1.27)
EGFRCR SERPLBLD CKD-EPI 2021: 76 ML/MIN/1.73
EOSINOPHIL # BLD AUTO: 0 X10E3/UL (ref 0–0.4)
EOSINOPHIL NFR BLD AUTO: 0 %
ERYTHROCYTE [DISTWIDTH] IN BLOOD BY AUTOMATED COUNT: 12.7 % (ref 11.6–15.4)
GLOBULIN SER CALC-MCNC: 2.1 G/DL (ref 1.5–4.5)
GLUCOSE SERPL-MCNC: 96 MG/DL (ref 70–99)
HCT VFR BLD AUTO: 52.3 % (ref 37.5–51)
HDLC SERPL-MCNC: 31 MG/DL
HGB BLD-MCNC: 17.3 G/DL (ref 13–17.7)
IMM GRANULOCYTES # BLD AUTO: 0 X10E3/UL (ref 0–0.1)
IMM GRANULOCYTES NFR BLD AUTO: 1 %
IMMATURE CELLS  115398: ABNORMAL
LABORATORY COMMENT REPORT: ABNORMAL
LDLC SERPL CALC-MCNC: 51 MG/DL (ref 0–99)
LYMPHOCYTES # BLD AUTO: 2.1 X10E3/UL (ref 0.7–3.1)
LYMPHOCYTES NFR BLD AUTO: 34 %
MCH RBC QN AUTO: 29.9 PG (ref 26.6–33)
MCHC RBC AUTO-ENTMCNC: 33.1 G/DL (ref 31.5–35.7)
MCV RBC AUTO: 90 FL (ref 79–97)
MONOCYTES # BLD AUTO: 1.7 X10E3/UL (ref 0.1–0.9)
MONOCYTES NFR BLD AUTO: 27 %
MORPHOLOGY BLD-IMP: ABNORMAL
NEUTROPHILS # BLD AUTO: 2.4 X10E3/UL (ref 1.4–7)
NEUTROPHILS NFR BLD AUTO: 38 %
NRBC BLD AUTO-RTO: ABNORMAL %
PLATELET # BLD AUTO: 87 X10E3/UL (ref 150–450)
POTASSIUM SERPL-SCNC: 4.4 MMOL/L (ref 3.5–5.2)
PROT SERPL-MCNC: 7 G/DL (ref 6–8.5)
RBC # BLD AUTO: 5.79 X10E6/UL (ref 4.14–5.8)
SODIUM SERPL-SCNC: 142 MMOL/L (ref 134–144)
TRIGL SERPL-MCNC: 74 MG/DL (ref 0–149)
VIT B12 SERPL-MCNC: 1055 PG/ML (ref 232–1245)
VLDLC SERPL CALC-MCNC: 16 MG/DL (ref 5–40)
WBC # BLD AUTO: 6.2 X10E3/UL (ref 3.4–10.8)

## 2023-06-28 DIAGNOSIS — N40.0 BENIGN PROSTATIC HYPERPLASIA, UNSPECIFIED WHETHER LOWER URINARY TRACT SYMPTOMS PRESENT: ICD-10-CM

## 2023-06-28 RX ORDER — ATORVASTATIN CALCIUM 10 MG/1
10 TABLET, FILM COATED ORAL DAILY
Qty: 90 TABLET | Refills: 3 | Status: SHIPPED | OUTPATIENT
Start: 2023-06-28

## 2023-06-28 RX ORDER — OMEPRAZOLE 20 MG/1
20 CAPSULE, DELAYED RELEASE ORAL DAILY
Qty: 90 CAPSULE | Refills: 3 | Status: SHIPPED | OUTPATIENT
Start: 2023-06-28

## 2023-06-28 RX ORDER — TAMSULOSIN HYDROCHLORIDE 0.4 MG/1
0.4 CAPSULE ORAL
Qty: 90 CAPSULE | Refills: 3 | Status: SHIPPED | OUTPATIENT
Start: 2023-06-28 | End: 2023-10-31

## 2023-07-06 ENCOUNTER — HOSPITAL ENCOUNTER (OUTPATIENT)
Dept: RADIOLOGY | Facility: MEDICAL CENTER | Age: 88
End: 2023-07-06
Attending: PSYCHIATRY & NEUROLOGY
Payer: MEDICARE

## 2023-07-06 DIAGNOSIS — I62.9 INTRACRANIAL HEMORRHAGE (HCC): ICD-10-CM

## 2023-07-06 PROCEDURE — 70551 MRI BRAIN STEM W/O DYE: CPT

## 2023-07-06 PROCEDURE — 70544 MR ANGIOGRAPHY HEAD W/O DYE: CPT

## 2023-07-13 ENCOUNTER — OFFICE VISIT (OUTPATIENT)
Dept: MEDICAL GROUP | Facility: LAB | Age: 88
End: 2023-07-13
Payer: MEDICARE

## 2023-07-13 VITALS
SYSTOLIC BLOOD PRESSURE: 118 MMHG | RESPIRATION RATE: 12 BRPM | DIASTOLIC BLOOD PRESSURE: 58 MMHG | HEART RATE: 60 BPM | TEMPERATURE: 97.7 F | OXYGEN SATURATION: 96 % | WEIGHT: 160 LBS | BODY MASS INDEX: 25.11 KG/M2 | HEIGHT: 67 IN

## 2023-07-13 DIAGNOSIS — D69.6 THROMBOCYTOPENIA (HCC): ICD-10-CM

## 2023-07-13 DIAGNOSIS — Z23 NEED FOR SHINGLES VACCINE: ICD-10-CM

## 2023-07-13 DIAGNOSIS — I62.9 INTRACRANIAL HEMORRHAGE (HCC): ICD-10-CM

## 2023-07-13 DIAGNOSIS — Z23 NEED FOR TDAP VACCINATION: ICD-10-CM

## 2023-07-13 PROCEDURE — 3078F DIAST BP <80 MM HG: CPT | Performed by: INTERNAL MEDICINE

## 2023-07-13 PROCEDURE — 99213 OFFICE O/P EST LOW 20 MIN: CPT | Performed by: INTERNAL MEDICINE

## 2023-07-13 PROCEDURE — 3074F SYST BP LT 130 MM HG: CPT | Performed by: INTERNAL MEDICINE

## 2023-07-13 RX ORDER — LATANOPROST 50 UG/ML
SOLUTION/ DROPS OPHTHALMIC
COMMUNITY
Start: 2023-05-08

## 2023-07-13 ASSESSMENT — FIBROSIS 4 INDEX: FIB4 SCORE: 2.39

## 2023-07-13 NOTE — PROGRESS NOTES
CC: Elias Vincent is a 87 y.o. male is suffering from   Chief Complaint   Patient presents with    Lab Results     3 months follow up         SUBJECTIVE:  1. Need for Tdap vaccination  Parents is here for follow-up, is in need of a tetanus shot, discussed by the medical assistant with the patient will need to get this through his pharmacy    2. Need for shingles vaccine  Prescription written    3. Thrombocytopenia (HCC)  Clinically stable is related temporally to patient's intracranial hemorrhage approximately 4 years prior    4. Intracranial hemorrhage (HCC)  Clinically stable, DMV paperwork completed today        Past social, family, history:   Social History     Tobacco Use    Smoking status: Never    Smokeless tobacco: Never   Vaping Use    Vaping Use: Never used   Substance Use Topics    Alcohol use: No    Drug use: No         MEDICATIONS:    Current Outpatient Medications:     latanoprost (XALATAN) 0.005 % Solution, , Disp: , Rfl:     NON SPECIFIED, Please vaccinate with Shingrix vaccine., Disp: 1 Each, Rfl: 1    tamsulosin (FLOMAX) 0.4 MG capsule, Take 1 Capsule by mouth 1/2 hour after breakfast., Disp: 90 Capsule, Rfl: 3    atorvastatin (LIPITOR) 10 MG Tab, Take 1 Tablet by mouth every day. Indications: High Amount of Fats in the Blood, Disp: 90 Tablet, Rfl: 3    omeprazole (PRILOSEC) 20 MG delayed-release capsule, Take 1 Capsule by mouth every day. Indications: Heartburn, Disp: 90 Capsule, Rfl: 3    VITAMIN D, CHOLECALCIFEROL, PO, Take  by mouth., Disp: , Rfl:     Omega-3 Fatty Acids (OMEGA-3 FISH OIL PO), Take 1 Tab by mouth every day., Disp: , Rfl:     Flaxseed, Linseed, (FLAXSEED OIL PO), Take 1 Tab by mouth every day., Disp: , Rfl:     multivitamin (THERAGRAN) Tab, Take 1 Tab by mouth every day., Disp: , Rfl:     PROBLEMS:  Patient Active Problem List    Diagnosis Date Noted    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 06/12/2023    Monoclonal B-cell lymphocytosis of undetermined  "significance 05/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  Heart: No chest pain.  Lungs:  No shortness of Breath.  Psychological: Juan unusual Anxiety depression     PHYSICAL EXAM   Constitutional: Alert, cooperative, not in acute distress.  Cardiovascular:  Rate Rhythm is regular without murmurs rubs clicks.     Thorax & Lungs: Clear to auscultation, no wheezing, rhonchi, or rales  HENT: Normocephalic, Atraumatic.  Eyes: PERRLA, EOMI, Conjunctiva normal.   Neck: Trachia is midline no swelling of the thyroid.   Lymphatic: No lymphadenopathy noted.   Neurologic: Alert & oriented x 3, cranial nerves II through XII are intact, Normal motor function, Normal sensory function, No focal deficits noted.   Psychiatric: Affect normal, Judgment normal, Mood normal.     VITAL SIGNS:/58   Pulse 60   Temp 36.5 °C (97.7 °F) (Temporal)   Resp 12   Ht 1.702 m (5' 7\")   Wt 72.6 kg (160 lb)   SpO2 96%   BMI 25.06 kg/m²     Labs: Reviewed    Assessment:                                                     Plan:    1. Need for Tdap vaccination  Vaccination to be done through the pharmacy  - Tdap =>8yo IM    2. Need for shingles vaccine  Shingles vaccine to be done through the pharmacy  - NON SPECIFIED; Please vaccinate with Shingrix vaccine.  Dispense: 1 Each; Refill: 1    3. Thrombocytopenia (HCC)  No change in medical therapy    4. Intracranial hemorrhage (HCC)  Clinically stable        "

## 2023-09-19 ENCOUNTER — OFFICE VISIT (OUTPATIENT)
Dept: NEUROLOGY | Facility: MEDICAL CENTER | Age: 88
End: 2023-09-19
Attending: CLINICAL NEUROPSYCHOLOGIST
Payer: MEDICARE

## 2023-09-19 DIAGNOSIS — F06.70 MILD VASCULAR NEUROCOGNITIVE DISORDER: ICD-10-CM

## 2023-09-19 DIAGNOSIS — I69.120 APHASIA DUE TO OLD INTRACEREBRAL HEMORRHAGE: ICD-10-CM

## 2023-09-19 DIAGNOSIS — I99.9 MILD VASCULAR NEUROCOGNITIVE DISORDER: ICD-10-CM

## 2023-09-19 DIAGNOSIS — I62.9 INTRACRANIAL HEMORRHAGE (HCC): ICD-10-CM

## 2023-09-19 PROCEDURE — 96137 PSYCL/NRPSYC TST PHY/QHP EA: CPT | Performed by: CLINICAL NEUROPSYCHOLOGIST

## 2023-09-19 PROCEDURE — 96116 NUBHVL XM PHYS/QHP 1ST HR: CPT | Performed by: CLINICAL NEUROPSYCHOLOGIST

## 2023-09-19 PROCEDURE — 96136 PSYCL/NRPSYC TST PHY/QHP 1ST: CPT | Performed by: CLINICAL NEUROPSYCHOLOGIST

## 2023-09-19 ASSESSMENT — ANXIETY QUESTIONNAIRES
4. TROUBLE RELAXING: NOT AT ALL
7. FEELING AFRAID AS IF SOMETHING AWFUL MIGHT HAPPEN: NOT AT ALL
1. FEELING NERVOUS, ANXIOUS, OR ON EDGE: NOT AT ALL
GAD7 TOTAL SCORE: 1
IF YOU CHECKED OFF ANY PROBLEMS ON THIS QUESTIONNAIRE, HOW DIFFICULT HAVE THESE PROBLEMS MADE IT FOR YOU TO DO YOUR WORK, TAKE CARE OF THINGS AT HOME, OR GET ALONG WITH OTHER PEOPLE: NOT DIFFICULT AT ALL
2. NOT BEING ABLE TO STOP OR CONTROL WORRYING: NOT AT ALL
3. WORRYING TOO MUCH ABOUT DIFFERENT THINGS: NOT AT ALL
5. BEING SO RESTLESS THAT IT IS HARD TO SIT STILL: NOT AT ALL
6. BECOMING EASILY ANNOYED OR IRRITABLE: SEVERAL DAYS

## 2023-09-19 ASSESSMENT — PATIENT HEALTH QUESTIONNAIRE - PHQ9: CLINICAL INTERPRETATION OF PHQ2 SCORE: 0

## 2023-09-19 NOTE — PATIENT INSTRUCTIONS
Thank you for your effort during today's evaluation. We will have a feedback session to discuss your results on 10/23/2023 at 9 AM.

## 2023-09-19 NOTE — PROGRESS NOTES
Neurobehavioral Status Exam and Neuropsychological Testing    Patient name: Elias Vincent  Referral source: Fransisco Thomason M.D.   MRN: [id number]  Evaluation date: 9/19/2023   YOB: 1935  Neuropsychologist: Sherice Robertson, Ph.D.         This evaluation was conducted for clinical treatment planning and may not be valid for other purposes. Potential risks and benefits, limits of confidentiality, and test procedures were discussed. Following this discussion, the patient consented to complete the evaluation. Information for this section was obtained from the medical record, neuropsychological testing, and a clinical interview with the patient and his wife conducted on 09/19/2023. Information included in this section is intended as a reference and should not be interpreted in the absence of the complete neuropsychological report. Please refer to the neuropsychological report for additional information including test results, impressions, and recommendations.     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 Dr. Thomason referred the patient for a neuropsychological evaluation to characterize his cognitive concerns, aid in differential diagnosis, and treatment planning.    Presenting Concerns Summary: The patient reported he has no memory of the stroke and the hospital stay in 2018. His wife noted that he had significant short-term memory and expressive language problems (e.g., reduced naming) following the stroke. She stated the patient engaged in physical and speech therapy, which were beneficial. She  noted his cognitive problems have slowly improved over time, but he has not returned to his baseline. She reported he has recovered 60 to 70 percent of his cognitive baseline. Specifically, the patient has difficulties with short-term memory, attention, multitasking, language, and aspects of executive functioning. His wife noted she has always managed bill payments and the patient's appointments. He reportedly remains independent with all remaining basic and instrumental activities of daily living (ADLs) including managing investments. He denied emotional distress on self-report questionnaires. During the clinical interview, he reported increased anxiety and irritability. His wife agreed and noted observing occasional episodes of anger including verbal outbursts. The patient also reported sleep maintenance problems.    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    Peritonitis (HCC)     Ruptured appendix in the 1960s      Past Surgical History:   Procedure Laterality Date    APPENDECTOMY      CHOLECYSTECTOMY      RIB RESECTION  1970s    Removed due to a blood clot in his shoulder      Family History   Problem Relation Age of Onset    Heart Disease Mother     Cancer Father         Brain    Cancer Sister     Heart Disease Brother     Stroke Sister     No Known Problems Son     No Known Problems Son     No Known Problems Son     No Known Problems Son     No Known  Problems Daughter     No Known Problems Daughter     No Known Problems Daughter     Dementia Maternal Aunt         Current Outpatient Medications:     latanoprost (XALATAN) 0.005 % Solution, , Disp: , Rfl:     NON SPECIFIED, Please vaccinate with Shingrix vaccine., Disp: 1 Each, Rfl: 1    tamsulosin (FLOMAX) 0.4 MG capsule, Take 1 Capsule by mouth 1/2 hour after breakfast., Disp: 90 Capsule, Rfl: 3    atorvastatin (LIPITOR) 10 MG Tab, Take 1 Tablet by mouth every day. Indications: High Amount of Fats in the Blood, Disp: 90 Tablet, Rfl: 3    omeprazole (PRILOSEC) 20 MG delayed-release capsule, Take 1 Capsule by mouth every day. Indications: Heartburn, Disp: 90 Capsule, Rfl: 3    VITAMIN D, CHOLECALCIFEROL, PO, Take  by mouth., Disp: , Rfl:     Omega-3 Fatty Acids (OMEGA-3 FISH OIL PO), Take 1 Tab by mouth every day., Disp: , Rfl:     Flaxseed, Linseed, (FLAXSEED OIL PO), Take 1 Tab by mouth every day., Disp: , Rfl:     multivitamin (THERAGRAN) Tab, Take 1 Tab by mouth every day., Disp: , Rfl:       Social History     Tobacco Use    Smoking status: Never    Smokeless tobacco: Never   Vaping Use    Vaping Use: Never used   Substance and Sexual Activity    Alcohol use: No    Drug use: No    Sexual activity: Yes     Partners: Female      Measures Administered: [employer] Diagnostic Aphasia Examination - 3rd Ed. (BDAE-3): Basic Word Discrimination, Commands, Complex Ideational Material, Word Repetition, & Sentence Repetition; [employer] Naming Test (BNT); Brief Visuospatial Memory Test - Revised (BVMT-R); Sonia-Son Executive Functioning System (DKEFS): Color-Word Interference (CWI) & Verbal Fluency (VF); Executive Clock Drawing Test (CLOX); Generalized Anxiety Disorder 7 Item Scale (ARIA-7); Kahn Verbal Learning Test - Revised (HVLT-R); Mini-Mental State Examination - 2nd Ed. Orientation (MMSE-2); Neuropsychological Assessment Battery (NAB) From 1: Dots; Patient Health Questionnaire (PHQ-9); Repeatable Battery for the  Assessment of Neuropsychological Status Line Orientation (RBANS LO); Test of Practical Judgment (TOPJ); Test of Premorbid Functioning (TOPF); Trail Making Test A & B (TMT); Wechsler Adult Intelligence Scale - 4th Ed. (WAIS-IV): Block Design (BD), Digit Span (DS), Matrix Reasoning (MR), Similarities (SI), Vocabulary (VC), Information (IN), & Coding (CD); Wechsler Memory Scale - 4th Ed. Logical Memory (WMS-IV LM); and Wide Range Achievement Test, 5th Ed.- Word Reading (WRAT-5 WR). Informant Questionnaires: Activities of Daily Living Questionnaire (ADLQ) and Neuropsychiatric Inventory Questionnaire (NPI-Q).     Behavioral Observations: The patient arrived at the clinic on time and was accompanied by his wife, who participated in the clinical interview. He was well groomed and dressed. He was alert and fully oriented to situation, person, place, and time (MMSE-2 Orientation = 10/10). He was polite and always maintained appropriate interpersonal boundaries. Rapport was easily established. Gait was unremarkable. He exhibited a mild action tremor in his right-hand during tasks that involved drawing, which did not significantly interfere with performance. No other gross abnormal movements or motor symptoms were observed. Speech rate, volume, articulation, and prosody were normal. Speech content was logical and appropriate to context. Expressive and receptive language were intact during conversation. Mood was euthymic during the appointment. Affect was mood-congruent and appropriate to the situation. Insight into cognitive and emotional functioning was adequate. There was no indication of hallucinations, delusions, or thought disorder. Vision and hearing were adequate for the evaluation.      During testing, he was attentive throughout the evaluation and had no difficulties with retention of task demands. On the freehand clock drawing task (CLOX 1), points were lost for omitting numbers, placing numbers outside the clock  face, symmetry errors, adding tick marks, and incorrect placement of the hour hand. General response style was unremarkable. Overall, he was engaged and cooperative throughout testing.      Sherice Robertson, Ph.D.          Clinical Neuropsychologist          Department of Neurology          [facility]           One hour and 10 minutes were spent interviewing the patient (neurobehavioral status exam: 33157 = 1). Test administration and scoring were completed in 4 hours and 43 minutes (76149 = 1, 78452 = 8).

## 2023-10-23 ENCOUNTER — OFFICE VISIT (OUTPATIENT)
Dept: NEUROLOGY | Facility: MEDICAL CENTER | Age: 88
End: 2023-10-23
Attending: CLINICAL NEUROPSYCHOLOGIST
Payer: MEDICARE

## 2023-10-23 DIAGNOSIS — I99.9 MILD VASCULAR NEUROCOGNITIVE DISORDER: ICD-10-CM

## 2023-10-23 DIAGNOSIS — I69.120 APHASIA DUE TO OLD INTRACEREBRAL HEMORRHAGE: ICD-10-CM

## 2023-10-23 DIAGNOSIS — F06.70 MILD VASCULAR NEUROCOGNITIVE DISORDER: ICD-10-CM

## 2023-10-23 DIAGNOSIS — I62.9 INTRACRANIAL HEMORRHAGE (HCC): ICD-10-CM

## 2023-10-23 PROCEDURE — 96132 NRPSYC TST EVAL PHYS/QHP 1ST: CPT | Performed by: CLINICAL NEUROPSYCHOLOGIST

## 2023-10-23 PROCEDURE — 96133 NRPSYC TST EVAL PHYS/QHP EA: CPT | Performed by: CLINICAL NEUROPSYCHOLOGIST

## 2023-10-23 NOTE — PROGRESS NOTES
NEUROPSYCHOLOGICAL FEEDBACK    Name: Elias Vincent    MRN: [id number]   YOB: 1935   Date of Feedback: 10/23/2023  Referred by: Fransisco Thomason M.D.  Evaluated by: Sherice Robertson, Ph.D.        The patient and his wife were seen for an interactive feedback session regarding the patient's neuropsychological evaluation on 09/19/2023. They reported he has been largely stable in terms of cognitive, functional, emotional, and physical functioning since the neuropsychological evaluation. I discussed the results of the evaluation and the recommendations in detail with the patient as well as his wife and they expressed understanding. The discussion included psychoeducation about mild neurocognitive disorder, vascular cognitive impairment, different types of dementia including vascular dementia, and other possible contributors to his cognitive decline. Psychoeducation was also provided regarding how depression, anxiety, poor sleep, elevated stress, and seizures can affect cognition. Additional information was provided regarding lifestyle factors associated with maintaining brain health. Follow-up with the referring provider to discuss treatment planning was recommended. The patient and his spouse were afforded time to ask questions and all their questions were addressed.      Sherice Robertson, Ph.D.                                                                             Clinical Neuropsychologist                                                                               Department of Neurology                                                                      [facility]

## 2023-10-23 NOTE — PROGRESS NOTES
CONFIDENTIAL NEUROPSYCHOLOGICAL EVALUATION REPORT    Patient name: Elias Vincent  Referral source: Fransisco Thomason M.D.   MRN: [id number]  Evaluation date: 09/19/2023   YOB: 1935  Neuropsychologist: Sherice Robertson, Ph.D.         This evaluation was conducted for clinical treatment planning and may not be valid for other purposes. Potential risks and benefits, limits of confidentiality, and test procedures were discussed. Following this discussion, the patient consented to complete the evaluation. Information for this report was obtained from the medical record, neuropsychological testing, and a clinical interview with the patient and his wife conducted on 09/19/2023. Feedback, including review of results and recommendations, was conducted on 10/23/2023.     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 Dr. Thomason referred the patient for a neuropsychological evaluation to characterize his cognitive concerns, aid in differential diagnosis, and treatment planning.    Per his medical record, the patient presented to the [facility] emergency department with a headache, memory loss, and aphasia on 12/31/2018. Brain MRI showed a left medial temporal hemorrhage, while vascular imaging studies revealed no evidence of aneurysm, arteriovenous malformations, or vascular process. MRI also revealed multiple areas of the posterior fossa and juxtacortical regions of both occipital hemispheres consistent with possible cerebral amyloid angiopathy (CAA). He was hospitalized until 01/04/2019.  "His wife reported the patient had one seizure and multiple episodes of acute confusion and worsening aphasia while he was hospitalized. He was treated with Keppra, which was switched to Depakote due to an adverse reaction following his discharge. He was discharged in stable conditions with plans to follow up with outpatient neurology and speech therapy (ST). His wife noted the patient is no longer taking any anti-seizure medication and has not had any seizures since he was discharged.      Previous Studies: Neurological exam (06/12/2023) was remarkable for: \"There is some hesitancy with word finding, resulting in some slight reduction in fluency. Paraphasic errors are not used. He has some difficulty following multistep verbal commands, he does much better reciting visual cues and following written commands. He has some difficulty with abstract thinking doing comparisons and similarities. He also has some notable difficulty spelling the word \"world\" backwards, though with corrections on his own he does get it done. He repeats, there is no apraxia or inattention. He is also redundant, at times perseverative about his age and his difficulty with naming.\" \"He stands easily, gait is normal and station and stride length, there is some mild apraxia when he attempts tandem walking though he does eventually get there on his own.\" MRI of the brain (07/06/2023) documented: \"1. Mild cerebral atrophy. Age-appropriate. 2. Mild supratentorial white matter disease most consistent with microvascular ischemic change. 3. Minimal pontine ischemic gliosis. 4. Numerous scattered foci of old microhemorrhage in both cerebral hemispheres and cerebellar hemispheres most consistent with old hypertensive microhemorrhage versus amyloid angiopathy. 5. No evidence of acute infarction, acute, or mass lesion.\" MRI of the brain (12/31/2018) documented: \"1. Grossly stable size of LEFT temporal parenchymal hematoma. No associated enhancement to " "suggest underlying mass or vascular malformation 2. New intraventricular extension of blood without hydrocephalus. 3. Foci of remote microhemorrhage in the RIGHT temporal lobe and RIGHT cerebellar hemisphere, possibly related to hypertension or amyloid angiopathy. 4. Mild-to-moderate atrophy. 5. Mild white matter changes\" MRA of the brain (07/06/2023) revealed: \"1. Normal variant dominant distal right vertebral artery. 2. No evidence of aneurysm or occlusive disease about the [place] of [place].\" CT of the brain (12/31/2018) revealed: \"1. There is hemorrhage within the temporal horn of the left lateral ventricle. No dilatation of the lateral ventricles.\" EEG (06/13/2023) was normal. EEG (12/31/2018) documented: \"This is a mildly abnormal routine video EEG recording in the awake and drowsy/sleep state(s). This scalp EEG denotes: 1. Mild focal cortical dysfunction over frontal --this patten might increase the risk of frontal seizure - advise clinical correlation regarding management. There are no epileptiform discharges in this record.\"    Neuropsychological Findings and Impressions    Presenting Concerns Summary: The patient reported he has no memory of the stroke and the hospital stay in 2018. His wife noted that he had significant short-term memory and expressive language problems (e.g., reduced naming) following the stroke. She stated the patient engaged in physical and speech therapy, which were beneficial. She noted his cognitive problems have slowly improved over time, but he has not returned to his baseline. She reported he has recovered 60 to 70 percent of his cognitive baseline. Specifically, the patient has difficulties with short-term memory, attention, multitasking, language, and aspects of executive functioning. His wife noted she has always managed bill payments and the patient's appointments. He reportedly remains independent with all remaining basic and instrumental activities of daily living (ADLs) " including managing investments. He denied emotional distress on self-report questionnaires. During the clinical interview, he reported increased anxiety and irritability. His wife agreed and noted observing occasional episodes of anger including verbal outbursts. The patient also reported sleep maintenance problems (please see below the recommendations section for additional information).     Results Summary/Interpretation: The patient's premorbid level of intellectual functioning was estimated in the average to above average range. Language was characterized by reduced vocabulary knowledge. All other aspects of language were intact including general fund of knowledge, semantic verbal fluency, visual confrontation naming, word reading, auditory comprehension, and repetition. Additional deficits were detected in aspects of attention/working memory (basic auditory attention span and working memory), processing speed (rapid color naming), and executive functioning (abstract verbal reasoning and freehand clock drawing). Memory was variable. [place] verbal memory (wordlist) was notable for deficient delayed recall, but intact encoding and recognition. Contextual verbal memory (stories) was characterized by within normal limits encoding, delayed recall, and recognition. Visual memory (simple figures) was notable for deficient encoding, but intact delayed recall and recognition. This overall pattern of improvement in memory performance with added structure in the form of stories and recognition cues is indicative of inattention and executive dysfunction interfering with encoding and retrieval of information. His performance on all the remaining measures across cognitive domains was within normal limits including motor praxis and visual perception/construction.     Impression: The patient's cognitive profile revealed impairments in aspects of language, attention/working memory, processing speed, and executive functioning that  likely interfered with memory processes (encoding and retrieval). 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 Etiologically, his attention, processing speed, and executive function deficits are suggestive of frontal-subcortical networks dysfunction, consistent with what is typically seen in patients with vascular cognitive impairment. This is corroborated by his history of multiple vascular risk factors, brain MRI findings of microhemorrhages and possible cerebral amyloid angiopathy, and history of stroke. Considering his history of sudden language decline following the stroke, relative improvement over time, and current pattern of performance, his language deficit is more likely a residual effect of the left temporal hemorrhage and not the product of a neurodegenerative disorder. Additional possible exacerbating factors to his cognitive difficulties include sleep problems, increased anxiety, and irritability with episodes of frustration. His pattern of language and memory performance including intact recognition memory, naming, and semantic fluency is not consistent with what is typically seen in patients with Alzheimer's disease. This evaluation may serve as a baseline for longitudinal tracking.      Recommendations:    Based on the present results, the patient is recommended for a repeat neuropsychological evaluation in the next 12 to 18 months or sooner if there is a considerable change in cognitive or emotional status. At that time, diagnostic impressions and treatment recommendations will be revised and updated as necessary. Additional testing will permit comparisons over time to better identify stability, potential improvement, or possible decline.  In light of his cognitive deficits, increased oversight and assistance with complex instrumental activities of daily living (e.g., financial and  medication/health management) from a family member or other trusted individual are recommended.   Given his cognitive deficits, increased oversight and assistance from family members or trusted others with complex decision-making (e.g., legal, financial, medical) are recommended.  Given his reported symptoms of anxiety and irritability, the patient may benefit from the initiation of individual counseling and/or a psychiatry consultation to further assess and treat these symptoms. Evidence-based treatments such as cognitive behavioral therapy, acceptance and commitment therapy, and mindfulness-based interventions may be particularly beneficial. He preferred to postpone a referral at this time but may reach out to me to place one as needed. The following are options for services in the community:  [facility] (https://www.[facility].org/health-services/behavioral-health; 189.108.5743 or 107-462-2252)   [facility] (https://www.[facility]/psychology/services-and-community-outreach/zwqaygkvdenwg-wuzsxexz-nmbtrc; [phone])  Elvia Stone, PhD (https://www.[facility] [employer]/; [phone])  [facility] (https://www.[facility]; [phone])  [facility] (https://www.[facility]/index.html; 338.610.1817)  [facility] (https://[place].[employer]; [phone])   A directory of providers can also be found on the Psychology Today website (www.psychologytoday.com)  Given his cognitive deficits, the patient may benefit from engagement in speech or occupational therapy with cognitive rehabilitation component to learn appropriate compensation strategies. He was placed in the waitlist for the cognitive training program in our department as well. He may reach out to me to place a referral as needed.   In light of his cognitive deficits, it is recommended that the patient continue to refrain from driving to ensure his safety and that of others on  the road. If this becomes an area of concern, it would be advised that he undergo a formal driving evaluation conducted by an occupational therapist with expertise in this area: [facility] (https://www.[facility].org/Health-Services/Physical-Therapy; [phone]).   The patient reported sleep problems that may be contributing to his cognitive difficulties. As such, he may benefit from education regarding sleep hygiene and healthy sleep habits, including maintenance of a regular sleep/wake cycle and integrating relaxation exercises before bed. This was provided during the feedback session. Additional strategies include:  The light emitted by phone screens, TVs, and iPads can mimic daylight and suppress the body's natural release of melatonin, making it harder to fall asleep. It is recommended to stop screen time about an hour before bed.  Develop a bedtime routine.  Keep the bedroom at a comfortable temperature.  Use low lighting in the evenings.  Avoid consuming large meals and caffeine close to bedtime.  Avoid sleeping during the day, as it can disturb the normal pattern of sleep and wakefulness.  Exercise can promote good sleep, particularly when completed in the morning or early afternoon.  Continued use of compensatory strategies is recommended to reduce cognitive demands. This may include setting scheduled routines, having an established location for essential items (e.g., wallet, glasses, and keys), completing tasks when there are no time demands, completing one task at a time, breaking down demanding tasks into smaller components, working on projects for shorter periods, maximizing time when he feels the most alert, minimizing external distractions, paraphrasing and repeating to be learned information, and using external aids for reminders (e.g., planner, calendar, setting alarms, labels, to-do lists) consistently.   In light of his medical history, cerebrovascular disease  represents a significant risk factor for future cognitive decline. As such, the patient is encouraged to reduce vascular risk factors per his providers' treatment recommendations (e.g., healthy diet, exercise, and medication adherence). The book Undo It!: How Simple Lifestyle Changes Can Reverse Most Chronic Diseases by Joni Pierre and Krysta Pierre may be a helpful resource for the patient and his family. The patient and his family may also benefit from resources available through the American Heart Association (https://www.heart.org/ or 1-415.471.7401), which offers psychoeducational information and services for individuals with vascular risk factors.   Individuals with mild neurocognitive disorder are at a higher risk of developing dementia in the future. As such, the patient is encouraged to regularly engage in social and physical recreation, as well as cognitively stimulating activities (e.g., puzzles, games, [employer]. https://www.[employer], reading, exercise, social gatherings) to promote brain health and emotional well-being. The book Living with Mild Cognitive Impairment: A Guide to Maximizing Brain Health and Reducing Risk of Dementia by Meghan Ramires may be a helpful resource for the patient and his family. The [facility] [employer] provides diverse educational & social opportunities for older adult learners (https://www.olli.[facility].edu; 899.276.3207).  If not already done so, the patient and his family members are encouraged to discuss legal and financial affairs, such as establishing a will and power of  to assist the patient with future financial and healthcare decisions. Information regarding these issues can be found at www.caringinfo.org or www.agingwithdignity.org/5wishes.html.    Presenting Concerns:     Cognitive Functioning: The patient reported he has no memory of the stroke and the hospital stay in 2018. His wife noted that he had significant short-term memory and  "expressive language problems following the stroke. She stated the patient engaged in outpatient physical and speech therapy (SP), which was beneficial. She reported his cognitive problems have slowly improved over time, but he did not return to his baseline. She noted he has recovered 60 to 70 percent of his cognitive baseline. She denied observing any significant cognitive difficulties before the stroke. Examples of the patient's current concerns included forgetting recent conversations and names including occasionally names of family members. Reminders are beneficial and he uses a calendar effectively. He also reported concentration problems. His wife explained he has difficulty focusing while reading, watching TV, and during conversations. Multitasking has become more difficult as well. She noted the patient relies on her for complex planning, but he has no problems with daily decision making and problem solving. She reported he has word finding problems and occasionally says the wrong word or \"mixes them up.\" She also noted he has difficulties with comprehension when he watches a TV show, though this might be partly related to inattention. Additionally, the patient noted that mental calculations have become more effortful. There were no reports of significant difficulties with navigation.      Functional Abilities: His wife reported she has always managed the household bill payments and the patient's appointments. She noted he continues to manage their investments (e.g., stocks) without problems and has no difficulty with simple monetary transactions. She organizes the pillbox for him, but he takes the medications independently. She noted he also remains independent with household responsibilities and self-care ADLs. He has not driven since the stroke.     Emotional Functioning: The patient reported increased anxiety since the stroke. His wife agreed and noted observing increased irritability and frustration " regarding activities he can no longer do. She stated he is more frequently angry while she drives and there have been multiple episodes of verbal outbursts to others without physical aggression. She also reported observing loss of interest in activities since the stroke and reduced appetite in last few weeks. The patient reported difficulties with sleep maintenance due to nocturia and early awakening for the past 6 months. His wife agreed, noting he typically takes short naps during the day. He also noted he has been having more vivid dreams since the stroke. His wife reported observing one isolated instance of sleepwalking approximately one month ago. The patient denied persistent sadness, anhedonia, suicidal ideation, and post-traumatic stress related symptoms. There were no reports of hallucinations or delusions.      Sensory/Physical/Motor Changes: The patient reported he has had reduced sense of taste and smell since the stroke. His wife noted observing slight hearing problems. He denied any recent decline in vision. He reported a mild balance decline and a mild postural hand tremor that has remained stable over time. He denied headaches, chronic pain, dysphagia, incontinence, weakness, and falls within the past 6 months.    Medical History: Per his medical record, it includes basal cell adenocarcinoma (resected without chemotherapy, radiation, or recurrence), hyperlipidemia, intracranial hemorrhage, aphasia, seizure, monoclonal B-cell lymphocytosis of undetermined significance, peritonitis (ruptured appendix in the 1960s), gastroesophageal reflux disease, hyperbilirubinemia, and thrombocytopenia. There is no reported history of traumatic head injuries. Surgical history includes appendectomy, cholecystectomy, and rib resection (due to a blood clot in his shoulder). Hospitalizations within the past month were denied.    Mental Health History: The patient denied a history of mental health treatment or diagnosis.      Family Medical History: Remarkable for dementia (maternal aunt; passed away when she was 93 years old), heart disease, cancer, and stroke.    Medications and Supplements: Latanoprost, tamsulosin, atorvastatin, omeprazole, vitamin D, omega-3 fatty acids, flaxseed oil, and multivitamin.     Psychosocial History: The patient was born and raised in [place]. He reported he was breached during birth, but this did not cause significant complications. He denied a known history of early developmental delays. He obtained a high school diploma, a bachelor's degree, and then completed dental school. He denied a history of learning, attention, or academic difficulties. He worked as a dentist in the [employer], noting he was in the [employer] for 32 years. His last job was as a dentist at the [employer] in [place], and he retired in 1997. 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 He has resided in [place], [place], since 1975. He currently lives with his wife of 62 years in a private residence. He has 7 children and 17 grandchildren. He reported good social support from family and friends. Hobbies and activities include attending [employer], participating in [employer] related activities, and traveling.    Substance Use: The patient denied current use of alcohol, illicit drugs, marijuana, and nicotine products. There is no reported history of substance use disorder or treatment.     Measures Administered: [place] Diagnostic Aphasia Examination - 3rd Ed. (BDAE-3): Basic Word Discrimination, Commands, Complex Ideational Material, Word Repetition, & Sentence Repetition; [place] Naming Test (BNT); Brief Visuospatial Memory Test - Revised (BVMT-R); Sonia-Son Executive Functioning System (DKEFS): Color-Word Interference (CWI) & Verbal Fluency (VF); Executive Clock Drawing Test (CLOX); Generalized Anxiety Disorder 7 Item Scale (ARIA-7); Kahn Verbal Learning Test - Revised (HVLT-R); Mini-Mental State Examination - 2nd Ed.  Orientation (MMSE-2); Neuropsychological Assessment Battery (NAB) From 1: Dots; Patient Health Questionnaire (PHQ-9); Repeatable Battery for the Assessment of Neuropsychological Status Line Orientation (RBANS LO); Test of Practical Judgment (TOPJ); Test of Premorbid Functioning (TOPF); Trail Making Test A & B (TMT); Wechsler Adult Intelligence Scale - 4th Ed. (WAIS-IV): Block Design (BD), Digit Span (DS), Matrix Reasoning (MR), Similarities (SI), Vocabulary (VC), Information (IN), & Coding (CD); Wechsler Memory Scale - 4th Ed. Logical Memory (WMS-IV LM); and Wide Range Achievement Test, 5th Ed.- Word Reading (WRAT-5 WR). Informant Questionnaires: Activities of Daily Living Questionnaire (ADLQ) and Neuropsychiatric Inventory Questionnaire (NPI-Q).    Behavioral Observations: The patient arrived at the clinic on time and was accompanied by his wife, who participated in the clinical interview. He was well groomed and dressed. He was alert and fully oriented to situation, person, place, and time (MMSE-2 Orientation = 10/10). He was polite and always maintained appropriate interpersonal boundaries. Rapport was easily established. Gait was unremarkable. He exhibited a mild action tremor in his right-hand during tasks that involved drawing, which did not significantly interfere with performance. No other gross abnormal movements or motor symptoms were observed. Speech rate, volume, articulation, and prosody were normal. Speech content was logical and appropriate to context. Expressive and receptive language were intact during conversation. Mood was euthymic during the appointment. Affect was mood-congruent and appropriate to the situation. Insight into cognitive and emotional functioning was adequate. There was no indication of hallucinations, delusions, or thought disorder. Vision and hearing were adequate for the evaluation.     During testing, he was attentive throughout the evaluation and had no difficulties with  retention of task demands. On the freehand clock drawing task (CLOX 1), points were lost for omitting numbers, placing numbers outside the clock face, symmetry errors, adding tick marks, and incorrect placement of the hour hand. General response style was unremarkable. Overall, he was engaged and cooperative throughout testing.    Results & Key Findings: Please note that scores reported are for professional use only. For diagnostic purposes, a performance score that falls below the 9th percentile of the reference group for that measure may be considered a cognitive deficit depending on the overall pattern of performance. The following clinical descriptors identify performance within the range of percentile scores indicated in the parentheses: Exceptionally High Score (>98th), Above Average Score (91st-97th), High Average Score (75th-90th), Average Score (25th-74th), Low Average Score (9th-24th), Below Average Score (3rd-8th), and Exceptionally Low Score (<2nd). Please see the test results summary table below for a list of measures administered as well as raw and normative scores, which are listed in the designated columns. All such scores are based on age-corrected norms and certain scores may be adjusted for education and/or other demographic factors as appropriate.     Data Validity: The patient's performance on embedded validity indicators was within the valid range, suggesting he appropriately engaged in testing. The following test results are considered an accurate representation of his current level of cognitive functioning.    Test Results Summary Table:           Self-Report Questionnaires: The patient's responses on self-report inventories of emotional functioning were indicative of minimal levels of depression (PHQ-9) and anxiety (ARIA-7) over the past two weeks.     Informant Questionnaires: His wife completed questionnaires about the patient's ability to function independently, implying a moderate level  of impairment in activities of daily living, with more difficulty in the areas of employment/recreation, communication, and transportation (ADLQ). On a questionnaire regarding neuropsychiatric symptoms, his wife reported observing mild anxiety, apathy, and irritability (NPI-Q).      Thank you for allowing me to participate in the patient's care. If I can be of further assistance, please do not hesitate to contact me.      Sherice Robertson, Ph.D.          Clinical Neuropsychologist          Department of Neurology          [facility]             This report was created using voice recognition software. I have made every reasonable attempt to avoid dictation errors, but this document may contain an error not identified before finalizing. If the error changes the accuracy of the document, I would appreciate it being brought to my attention. Thank you.    Four hours and 17 minutes were spent in clinical decision-making, chart review, records reviews, interpretation of test results and clinical data, integration of patient data, interactive feedback to the patient, and report preparation (test evaluation services: 92103 = 1, [id number] = 3).

## 2023-10-30 PROBLEM — I99.9 MILD VASCULAR NEUROCOGNITIVE DISORDER: Status: ACTIVE | Noted: 2023-10-30

## 2023-10-30 PROBLEM — F06.70 MILD VASCULAR NEUROCOGNITIVE DISORDER: Status: ACTIVE | Noted: 2023-10-30

## 2023-10-31 ENCOUNTER — OFFICE VISIT (OUTPATIENT)
Dept: NEUROLOGY | Facility: MEDICAL CENTER | Age: 88
End: 2023-10-31
Attending: PSYCHIATRY & NEUROLOGY
Payer: MEDICARE

## 2023-10-31 VITALS
HEIGHT: 67 IN | TEMPERATURE: 98.8 F | BODY MASS INDEX: 25.19 KG/M2 | WEIGHT: 160.5 LBS | DIASTOLIC BLOOD PRESSURE: 70 MMHG | OXYGEN SATURATION: 93 % | SYSTOLIC BLOOD PRESSURE: 110 MMHG | HEART RATE: 80 BPM

## 2023-10-31 DIAGNOSIS — I68.0 CEREBRAL AMYLOID ANGIOPATHY (HCC): ICD-10-CM

## 2023-10-31 DIAGNOSIS — F06.70 MILD VASCULAR NEUROCOGNITIVE DISORDER: ICD-10-CM

## 2023-10-31 DIAGNOSIS — E85.4 CEREBRAL AMYLOID ANGIOPATHY (HCC): ICD-10-CM

## 2023-10-31 DIAGNOSIS — I99.9 MILD VASCULAR NEUROCOGNITIVE DISORDER: ICD-10-CM

## 2023-10-31 PROCEDURE — 99212 OFFICE O/P EST SF 10 MIN: CPT | Performed by: PSYCHIATRY & NEUROLOGY

## 2023-10-31 PROCEDURE — 3078F DIAST BP <80 MM HG: CPT | Performed by: PSYCHIATRY & NEUROLOGY

## 2023-10-31 PROCEDURE — 99215 OFFICE O/P EST HI 40 MIN: CPT | Performed by: PSYCHIATRY & NEUROLOGY

## 2023-10-31 PROCEDURE — 3074F SYST BP LT 130 MM HG: CPT | Performed by: PSYCHIATRY & NEUROLOGY

## 2023-10-31 ASSESSMENT — ENCOUNTER SYMPTOMS
MEMORY LOSS: 1
SENSORY CHANGE: 0
FALLS: 0
SPEECH CHANGE: 1
INSOMNIA: 0

## 2023-10-31 ASSESSMENT — FIBROSIS 4 INDEX: FIB4 SCORE: 2.42

## 2023-10-31 NOTE — PROGRESS NOTES
Subjective     Elias Vincent is a 88 y.o. male who presents with his wife Evelin, for follow-up, with a history of cognitive impairment and left temporal lobar hemorrhage consistent with possible CAA.  He is now status post neuropsychological testing and repeat imaging.    HPI    Since last seen just a few months ago, Elias states that his clinical circumstance has not changed much.  He denies any episodes consistent with sudden focal neurologic signs or deficits consistent with possible TIA or hemorrhage.  He is not on any new medications.  The slowed mental processing speed, increased forgetfulness, and language difficulties with word finding and slow comprehension are all unchanged.    MRI of the brain revealed just the sequelae of the old left mesial temporal hemorrhage but also multiple areas of microscopic periarterial hemosiderin deposition in both hemispheres as well as a mild degree of bilateral atherosclerotic ischemic changes.  MRA of the brain revealed no significant large vessel occlusion or atherosclerotic disease.  ESR and JOSE G from 2019 were both unremarkable.    Neuropsychological testing confirmed the presence of a likely subcortical dementing process causing mild cognitive impairment.  There were no findings consistent with a cortical degenerative process such as Alzheimer's disease.  Recommendations for some assistance from others when he came to more complex decision making and cognitive skill use were made.  There is also a significant degree of anxiety and mood dysfunction that might warrant some therapy for supportive measures.    Medical, surgical and family histories are reviewed, there are no new drug allergies.  He is on Lipitor 10 mg daily, flaxseed oil, multivitamin, fish oil, vitamin D and Prilosec.    Review of Systems   Musculoskeletal:  Negative for falls.   Neurological:  Positive for speech change. Negative for sensory change.   Psychiatric/Behavioral:  Positive for memory  "loss. The patient does not have insomnia.    All other systems reviewed and are negative.    Objective     /70 (BP Location: Right arm, Patient Position: Sitting, BP Cuff Size: Adult)   Pulse 80   Temp 37.1 °C (98.8 °F) (Temporal)   Ht 1.702 m (5' 7\")   Wt 72.8 kg (160 lb 7.9 oz)   SpO2 93%   BMI 25.14 kg/m²      Physical Exam    He appears in no acute distress.  Vital signs are stable.  There is no malar rash.  The neck is supple, range of motion is full.  Cardiac evaluation is unremarkable.     Neurological Exam    He is still fully oriented.  The reduction in speech fluency with some word finding difficulties is unchanged.  He still repeats easily.  There is no apraxia or inattention.    Remaining portions of the neurologic examination were deferred, attention was paid to the results of testing and evaluations, diagnosis, prognosis, etc.    Assessment & Plan     1. Cerebral amyloid angiopathy (HCC)  I suspect this gentleman probably is suffering from the effects of CAA.  I feel there is some mild degree of atherosclerotic changes seen in both cerebral hemispheres, I do not think this is clearly enough to explain the difficulties he is having.  MRI findings are consistent with CAA, though a clear diagnosis is not typically established prior to postmortem brain evaluation.  The mechanism of amyloid deposition of blood vessel walls, resulting in hemorrhaging, etc. was talked about briefly.    Because of the bleeding risk, he was again told to avoid NSAIDs as a group as well as any type of anticoagulation moving forward if it is deemed required.  Specific treatment for this condition or not available.  He was encouraged to follow through with recommendations by Dr. Robertson when he came to assistance with more complex executive tasks, etc.  I also encouraged consideration of therapy to help with some of the anxiety he does exhibit.    Prognosis is more of a slow change over a long interval of time.  All of " this is unpredictable.  He understands the signs and symptoms suggestive of CNS ischemia/hemorrhage, and what to do in the circumstances.    2. Mild vascular neurocognitive disorder  Most likely a subcortical process, I agree that this is not likely due to a superimposed cortical disease such as Alzheimer's.  Unfortunately active treatment are not available.  He was encouraged to remain active physically as well as socially and cognitively.  He was reassured that he does not need to restrict his activities in any way.  We talked about the definition of mild cognitive impairment, and why he fits into that category.  The mother would anticipate progression over time, this may be more step-wise and not a continuous, more rapid process that might be part of her degenerative disease.    There is no need for additional neurologic follow-up at this time.    Time: 40 minutes in total spent on patient care including creatinine charting, record review, discussion with healthcare staff and documentation.  This includes face-to-face time for exam, review, discussion, as well as counseling and coordinating care.

## 2023-11-29 ENCOUNTER — PATIENT MESSAGE (OUTPATIENT)
Dept: HEALTH INFORMATION MANAGEMENT | Facility: OTHER | Age: 88
End: 2023-11-29

## 2023-12-04 NOTE — PROGRESS NOTES
Date of visit: 5/17/2019  8:07 AM      Chief Complaint- History of intraventricular bleed, mild thrombocytopenia      Identification/Prior relevant history: Per my note dated 2/2019   interim history  -Is here for a 2-month follow-up.  Subsequent platelet count has improved to 100,000 range.  No new bleeding or bruising.  Overall he is feeling better.  He has relative monocytosis seen in his CBCs    Past Medical History:      Past Medical History:   Diagnosis Date   • Basal cell adenocarcinoma    • Blood clot in vein     Left arm - had to have his rib removed.    • Hyperlipidemia    • Intracranial hemorrhage (HCC) 12/31/2018   • Melanoma (HCC)    • Peritonitis (HCC)     Ruptured appendix in the 1960s       Past surgical history:       Past Surgical History:   Procedure Laterality Date   • APPENDECTOMY     • CHOLECYSTECTOMY     • RIB RESECTION  1970s    Removed due to a blood clot in his shoulder       Allergies:       Levetiracetam and Oxcarbazepine    Medications:         Current Outpatient Prescriptions   Medication Sig Dispense Refill   • Omega-3 Fatty Acids (OMEGA-3 FISH OIL PO) Take 1 Tab by mouth every day.     • Flaxseed, Linseed, (FLAXSEED OIL PO) Take 1 Tab by mouth every day.     • multivitamin (THERAGRAN) Tab Take 1 Tab by mouth every day.     • atorvastatin (LIPITOR) 10 MG Tab Take 10 mg by mouth every day.     • LATANOPROST OP 1 Drop by Ophthalmic route every day at 6 PM.     • omeprazole (PRILOSEC) 20 MG CPDR Take 20 mg by mouth every day.     • LORazepam (ATIVAN) 1 MG Tab Take 1 mg by mouth every 8 hours as needed for Anxiety.     • acetaminophen (TYLENOL) 500 MG Tab Take 500-1,000 mg by mouth every 6 hours as needed for Mild Pain or Moderate Pain.       No current facility-administered medications for this visit.          Social History:     Social History     Social History   • Marital status:      Spouse name: N/A   • Number of children: N/A   • Years of education: N/A     Occupational  Pt MRI done about 10 minutes ago and waiting for [facility] to arrive / Text received and TJ on way to MRI / pt informed and understanding of delay    "History   • Not on file.     Social History Main Topics   • Smoking status: Never Smoker   • Smokeless tobacco: Never Used   • Alcohol use No   • Drug use: No   • Sexual activity: Yes     Other Topics Concern   • Not on file     Social History Narrative   • No narrative on file       Family History:      Family History   Problem Relation Age of Onset   • Heart Disease Mother    • Cancer Father         Brain   • Cancer Sister    • Heart Disease Brother    • Stroke Sister    • No Known Problems Son    • No Known Problems Son    • No Known Problems Son    • No Known Problems Son    • No Known Problems Daughter    • No Known Problems Daughter    • No Known Problems Daughter    • Dementia Maternal Aunt        Review of Systems:  All other review of systems are negative except what was mentioned above in the HPI.    Constitutional: Negative for fever, chills, weight loss and malaise/fatigue.    HEENT: No new auditory or visual complaints. No sore throat and neck pain.     Respiratory: Negative for cough, sputum production, shortness of breath and wheezing.    Cardiovascular: Negative for chest pain, palpitations, orthopnea and leg swelling.    Gastrointestinal: Negative for heartburn, nausea, vomiting and abdominal pain.    Genitourinary: Negative for dysuria, hematuria    Musculoskeletal: No new arthralgias or myalgias   Skin: Negative for rash and itching.    Neurological: Negative for focal weakness and headaches.    Endo/Heme/Allergies: No abnormal bleed/bruise.    Psychiatric/Behavioral: No new depression/anxiety.    Physical Exam:  Vitals: /64 (BP Location: Right arm, Patient Position: Sitting, BP Cuff Size: Adult)   Pulse 70   Temp 37 °C (98.6 °F) (Temporal)   Resp 16   Ht 1.75 m (5' 8.9\")   Wt 69.9 kg (154 lb 1.6 oz)   SpO2 95%   BMI 22.82 kg/m²     General: Not in acute distress, alert and oriented x 3  HEENT: No pallor, icterus. Oropharynx clear.   Neck: Supple, no palpable masses.  Lymph nodes: No " palpable cervical, supraclavicular, axillary or inguinal lymphadenopathy.    CVS: regular rate and rhythm, no rubs or gallops  RESP: Clear to auscultate bilaterally, no wheezing or crackles.   ABD: Soft, non tender, non distended, positive bowel sounds, no palpable organomegaly  EXT: No edema or cyanosis  CNS: Alert and oriented x3, No focal deficits.  Skin- No rash      Labs:   Hospital Outpatient Visit on 05/16/2019   Component Date Value Ref Range Status   • Source: 05/16/2019 Whole Blood   Final   • Number of Markers 05/16/2019 28  markers Final   • % CD2, Leuk/Lymph Phenotype 05/16/2019 91  % Final   • % CD3, Leuk/Lymph Phenotype 05/16/2019 84  % Final   • % CD4, Leuk/Lymph Phenotype 05/16/2019 44  % Final   • % CD5, Leuk/Lymph Phenotype 05/16/2019 83  % Final   • % CD7, Leuk/Lymph Phenotype 05/16/2019 84  % Final   • % CD8, Leuk/Lymph Phenotype 05/16/2019 43  % Final   • % Gamma-Delta, Leuk/Lymph Phenotype 05/16/2019 8  % Final   • % CD1, Leuk/Lymph Phenotype 05/16/2019 <1  % Final   • % CD16, Leuk/Lymph Phenotype 05/16/2019 20  % Final   • % CD56, Leuk/Lymph Phenotype 05/16/2019 16  % Final   • % CD57, Leuk/Lymph Phenotype 05/16/2019 38  % Final   • % CD10, Leuk/Lymph Phenotype 05/16/2019 <1  % Final   • % CD19, Leuk/Lymph Phenotype 05/16/2019 3  % Final   • % CD20, Leuk/Lymph Phenotype 05/16/2019 2  % Final   • % CD22, Leuk/Lymph Phenotype 05/16/2019 3  % Final   • % CD23, Leuk/Lymph Phenotype 05/16/2019 4  % Final   • % Kappa, Leuk/Lymph Phenotype 05/16/2019 See Note  % Final   • % Lambda, Leuk/Lymph Phenotype 05/16/2019 See Note  % Final   • % Cyto Kappa, Leuk/Lymph Phenotype 05/16/2019 See Note  % Final   • % Cyto Lambda, Leuk/Lymph Phenotype 05/16/2019 See Note  % Final   • % CD34, Leuk/Lymph Phenotype 05/16/2019 <1  % Final   • % CD11b, Leuk/Lymph Phenotype 05/16/2019 5  % Final   • % CD13, Leuk/Lymph Phenotype 05/16/2019 2  % Final   • % CD33, Leuk/Lymph Phenotype 05/16/2019 5  % Final   • % CD64,  Leuk/Lymph Phenotype 05/16/2019 5  % Final   • % CD38, Leuk/Lymph Phenotype 05/16/2019 1  % Final   • % , Leuk/Lymph Phenotype 05/16/2019 3  % Final   • % CD45, Leuk/Lymph Phenotype 05/16/2019 See Note  % Final   • Impression, Leuk/Lymph Phenotype 05/16/2019 See Note   Final    Comment: PERIPHERAL BLOOD, FLOW CYTOMETRIC IMPRESSION:  Phenotypically normal slightly left shifted myeloid cells and a  very small population of monoclonal CD5 positive B-cells (0.3% of  the leukocytes) with phenotypic features consistent with chronic  lymphocytic leukemia (CLL), without flow cytometric evidence of  acute leukemia or T-cell lymphoproliferative disorder. See  comment.  COMMENT:  The clinical significance of the small population of monoclonal  CD5 positive B-cells is uncertain and is not diagnostic of CLL  since small populations of CLL-phenotype cells can frequently be  seen in normal adults (see Blood 100:635, 2002). The clinical  significance of the small population of mature T-cells that show  co-expression of CD4 and CD8 representing 5% of the leukocytes is  uncertain, since these have been reported in peripheral blood flow  cytometry studies in about 50% of otherwise healthy individuals  over the age of 65 ([facility]-2007,[phone]). Please correlate the  results from this mar                           dy morphologically and clinically for proper  interpretation.  ANALYSIS:  Flow cytometric analysis of the peripheral blood specimen  indicates that 54% of the CD45 positive leukocytes are  lymphocytes, 13% are monocytes, and 33% are myeloid cells.  However, the flow cytometric differential may not be accurate due  to increased neutrophil lysis by ammonium chloride, and should be  verified by other laboratory methods. 84% of the lymphocytes  (percentages shown) are T-cells, 12% are NK cells and 2% are  B-cells. Approximately 12% of the T-cells coexpress dim CD8 with  normal intensity CD4 and show near normal levels of CD2,  CD3, CD7,  and CD5, with expression of partial CD16, partial CD56 and partial  CD57, but do not express CD1a, or TCR gamma delta. The remaining  T-cells are heterogeneous and have a CD4:CD8 ratio of 1.2.  Approximately 25-30% of the B-cells are monotypic and express dim  kappa light chains, CD5, CD19, dim CD20, CD23, and , but do  not express CD10 or CD38. The re                           maining B-cells lack CD5 and are  polytypic. Plasma cells (dim to negative CD45, bright CD38, bright  cytoplasmic light chains) are not identified. The myeloid cells  demonstrate phenotypically normal expression patterns for the  markers evaluated, including  CD10, CD13, CD16, CD33, and CD64,  with a mild left shift (increased percentage that lack CD11b). The  viability of the specimen is 90%.  This result has been reviewed and approved by Bob Lala MD,  PhD.  05/18/2019 15:04  INTERPRETIVE INFORMATION: Leuk/Lymph Phenotyping, Flow Cytometry  Test developed and characteristics determined by [email or web address]. See Compliance Statement A: [facility]  Performed by [email or web address],  [address] [place] [id number] [phone]  [email or web address], Giancarlo Horowitz MD - Lab. Director     Hospital Outpatient Visit on 05/15/2019   Component Date Value Ref Range Status   • WBC 05/15/2019 5.3  4.8 - 10.8 K/uL Final   • RBC 05/15/2019 5.50  4.70 - 6.10 M/uL Final   • Hemoglobin 05/15/2019 16.6  14.0 - 18.0 g/dL Final   • Hematocrit 05/15/2019 50.8  42.0 - 52.0 % Final   • MCV 05/15/2019 92.4  81.4 - 97.8 fL Final   • MCH 05/15/2019 30.2  27.0 - 33.0 pg Final   • MCHC 05/15/2019 32.7* 33.7 - 35.3 g/dL Final   • RDW 05/15/2019 42.4  35.9 - 50.0 fL Final   • Platelet Count 05/15/2019 109* 164 - 446 K/uL Final   • MPV 05/15/2019 10.2  9.0 - 12.9 fL Final   • Neutrophils-Polys 05/15/2019 37.10* 44.00 - 72.00 % Final   • Lymphocytes 05/15/2019 35.80  22.00 - 41.00 % Final   • Monocytes 05/15/2019 26.00* 0.00 - 13.40 % Final   •  Eosinophils 05/15/2019 0.20  0.00 - 6.90 % Final   • Basophils 05/15/2019 0.20  0.00 - 1.80 % Final   • Immature Granulocytes 05/15/2019 0.70  0.00 - 0.90 % Final   • Nucleated RBC 05/15/2019 0.00  /100 WBC Final   • Neutrophils (Absolute) 05/15/2019 1.98  1.82 - 7.42 K/uL Final    Includes immature neutrophils, if present.   • Lymphs (Absolute) 05/15/2019 1.91  1.00 - 4.80 K/uL Final   • Monos (Absolute) 05/15/2019 1.39* 0.00 - 0.85 K/uL Final   • Eos (Absolute) 05/15/2019 0.01  0.00 - 0.51 K/uL Final   • Baso (Absolute) 05/15/2019 0.01  0.00 - 0.12 K/uL Final   • Immature Granulocytes (abs) 05/15/2019 0.04  0.00 - 0.11 K/uL Final   • NRBC (Absolute) 05/15/2019 0.00  K/uL Final             Assessment and Plan:  History of IC bleed-he presented with intraventricular bleed. ? Cerebral amyloidosis-sent for hematology evaluation as he had mild thrombocytopenia.  Rest of the coagulation work-up including von Willebrand screen platelet function assay was unremarkable.  He has only mild thrombocytopenia which has remained stable in the 100,000 range.  Does not appear that thrombus cytopenia contributed to his IV bleed.    Chronic monocytosis? -No other lymphadenopathy.  A flow cytometry was done which showed slightly left shifted myeloid cells and a very small population of monoclonal CD5 positive B-cells (0.3% of the leukocytes) with phenotypic features consistent with chronic lymphocytic leukemia (CLL), without flow cytometric evidence of   acute leukemia or T-cell lymphoproliferative disorder.   This was an incidental finding and does not appear to be clinically significant.  His total white count is within normal limits.  No significant anemia or lymphadenopathy.  This can be monitored.  If he develops any significant elevation in the WBC count he can be seen back.    He can be followed up with his PCP.  If he develops thrombocytopenia with platelets less than 60,000 or WBC count above 10,000 range we will be glad to  see him back for further evaluation.    He agreed and verbalized  agreement and understanding with the current plan.  I answered all questions and concerns at this time         Please note that this dictation was created using voice recognition software. I have made every reasonable attempt to correct obvious errors, but I expect that there are errors of grammar and possibly content that I did not discover before finalizing the note.      SIGNATURES:  Artur Grey    CC:  Finn Cummins D.O.  No ref. provider found

## 2024-01-05 LAB
ALBUMIN SERPL-MCNC: 5 G/DL (ref 3.7–4.7)
ALBUMIN/GLOB SERPL: 2.3 {RATIO} (ref 1.2–2.2)
ALP SERPL-CCNC: 60 IU/L (ref 44–121)
ALT SERPL-CCNC: 21 IU/L (ref 0–44)
AST SERPL-CCNC: 12 IU/L (ref 0–40)
BASOPHILS # BLD AUTO: 0 X10E3/UL (ref 0–0.2)
BASOPHILS NFR BLD AUTO: 1 %
BILIRUB SERPL-MCNC: 3 MG/DL (ref 0–1.2)
BUN SERPL-MCNC: 22 MG/DL (ref 8–27)
BUN/CREAT SERPL: 17 (ref 10–24)
CALCIUM SERPL-MCNC: 9.7 MG/DL (ref 8.6–10.2)
CHLORIDE SERPL-SCNC: 103 MMOL/L (ref 96–106)
CHOLEST SERPL-MCNC: 103 MG/DL (ref 100–199)
CO2 SERPL-SCNC: 22 MMOL/L (ref 20–29)
CREAT SERPL-MCNC: 1.27 MG/DL (ref 0.76–1.27)
EGFRCR SERPLBLD CKD-EPI 2021: 55 ML/MIN/1.73
EOSINOPHIL # BLD AUTO: 0 X10E3/UL (ref 0–0.4)
EOSINOPHIL NFR BLD AUTO: 0 %
ERYTHROCYTE [DISTWIDTH] IN BLOOD BY AUTOMATED COUNT: 12.8 % (ref 11.6–15.4)
GLOBULIN SER CALC-MCNC: 2.2 G/DL (ref 1.5–4.5)
GLUCOSE SERPL-MCNC: 94 MG/DL (ref 70–99)
HCT VFR BLD AUTO: 53.7 % (ref 37.5–51)
HDLC SERPL-MCNC: 30 MG/DL
HGB BLD-MCNC: 18.2 G/DL (ref 13–17.7)
IMM GRANULOCYTES # BLD AUTO: 0.1 X10E3/UL (ref 0–0.1)
IMM GRANULOCYTES NFR BLD AUTO: 1 %
IMMATURE CELLS  115398: ABNORMAL
LABORATORY COMMENT REPORT: ABNORMAL
LDLC SERPL CALC-MCNC: 54 MG/DL (ref 0–99)
LYMPHOCYTES # BLD AUTO: 2.6 X10E3/UL (ref 0.7–3.1)
LYMPHOCYTES NFR BLD AUTO: 39 %
MCH RBC QN AUTO: 30.6 PG (ref 26.6–33)
MCHC RBC AUTO-ENTMCNC: 33.9 G/DL (ref 31.5–35.7)
MCV RBC AUTO: 90 FL (ref 79–97)
MONOCYTES # BLD AUTO: 1.6 X10E3/UL (ref 0.1–0.9)
MONOCYTES NFR BLD AUTO: 24 %
MORPHOLOGY BLD-IMP: ABNORMAL
NEUTROPHILS # BLD AUTO: 2.3 X10E3/UL (ref 1.4–7)
NEUTROPHILS NFR BLD AUTO: 35 %
NRBC BLD AUTO-RTO: ABNORMAL %
PLATELET # BLD AUTO: 92 X10E3/UL (ref 150–450)
POTASSIUM SERPL-SCNC: 4.2 MMOL/L (ref 3.5–5.2)
PROT SERPL-MCNC: 7.2 G/DL (ref 6–8.5)
RBC # BLD AUTO: 5.94 X10E6/UL (ref 4.14–5.8)
SODIUM SERPL-SCNC: 144 MMOL/L (ref 134–144)
TRIGL SERPL-MCNC: 99 MG/DL (ref 0–149)
VIT B12 SERPL-MCNC: 1237 PG/ML (ref 232–1245)
VLDLC SERPL CALC-MCNC: 19 MG/DL (ref 5–40)
WBC # BLD AUTO: 6.6 X10E3/UL (ref 3.4–10.8)

## 2024-01-15 ENCOUNTER — OFFICE VISIT (OUTPATIENT)
Dept: MEDICAL GROUP | Facility: LAB | Age: 89
End: 2024-01-15
Payer: MEDICARE

## 2024-01-15 VITALS
SYSTOLIC BLOOD PRESSURE: 108 MMHG | TEMPERATURE: 98.7 F | WEIGHT: 160 LBS | HEIGHT: 67 IN | BODY MASS INDEX: 25.11 KG/M2 | DIASTOLIC BLOOD PRESSURE: 60 MMHG | OXYGEN SATURATION: 95 % | RESPIRATION RATE: 16 BRPM | HEART RATE: 67 BPM

## 2024-01-15 DIAGNOSIS — E78.5 DYSLIPIDEMIA: ICD-10-CM

## 2024-01-15 DIAGNOSIS — R79.89 ABNORMAL CBC: ICD-10-CM

## 2024-01-15 DIAGNOSIS — E80.6 HYPERBILIRUBINEMIA: ICD-10-CM

## 2024-01-15 PROCEDURE — 3078F DIAST BP <80 MM HG: CPT | Performed by: INTERNAL MEDICINE

## 2024-01-15 PROCEDURE — 99214 OFFICE O/P EST MOD 30 MIN: CPT | Performed by: INTERNAL MEDICINE

## 2024-01-15 PROCEDURE — 3074F SYST BP LT 130 MM HG: CPT | Performed by: INTERNAL MEDICINE

## 2024-01-15 ASSESSMENT — FIBROSIS 4 INDEX: FIB4 SCORE: 2.45

## 2024-01-15 NOTE — PROGRESS NOTES
CC: Elias Vincent is a 88 y.o. male is suffering from   Chief Complaint   Patient presents with    Follow-Up         SUBJECTIVE:  1. Dyslipidemia  Patient is here for follow-up has a history of dyslipidemia which appears to be stable is to continue on atorvastatin    2. Hyperbilirubinemia  History of hyperbilirubinemia, no change in medical therapy    3. Abnormal CBC  Abnormal CBC followed by hematology oncology        Past social, family, history: , retired dentist [employer]  Social History     Tobacco Use    Smoking status: Never    Smokeless tobacco: Never   Vaping Use    Vaping Use: Never used   Substance Use Topics    Alcohol use: No    Drug use: No         MEDICATIONS:    Current Outpatient Medications:     latanoprost (XALATAN) 0.005 % Solution, , Disp: , Rfl:     NON SPECIFIED, Please vaccinate with Shingrix vaccine., Disp: 1 Each, Rfl: 1    atorvastatin (LIPITOR) 10 MG Tab, Take 1 Tablet by mouth every day. Indications: High Amount of Fats in the Blood, Disp: 90 Tablet, Rfl: 3    omeprazole (PRILOSEC) 20 MG delayed-release capsule, Take 1 Capsule by mouth every day. 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  Heart: No chest pain.  Lungs:  No shortness of Breath.  Psychological: Juan unusual Anxiety depression     PHYSICAL EXAM   Constitutional: Alert, cooperative, not in acute distress.  Cardiovascular:  Rate Rhythm is regular without murmurs rubs clicks.     Thorax & Lungs: Clear to auscultation, no wheezing, rhonchi, or rales  HENT: Normocephalic, Atraumatic.  Eyes: PERRLA, EOMI, Conjunctiva normal.   Neck: Trachia is midline no swelling of the thyroid.   Lymphatic: No lymphadenopathy noted.   Neurologic: Alert & oriented x 3, cranial nerves II through XII are intact, Normal motor function, Normal sensory function, No focal deficits noted.   Psychiatric: Affect normal, Judgment normal, Mood normal.     VITAL SIGNS:/60 (BP Location: Left arm, Patient Position: Sitting, BP Cuff Size: Large adult)   Pulse 67   Temp 37.1 °C (98.7 °F)   Resp 16   Ht 1.702 m (5' 7\")   Wt 72.6 kg (160 lb)   SpO2 95%   BMI 25.06 kg/m²     Labs: Reviewed    Assessment:                                                     Plan:    1. Dyslipidemia  No change in medical therapy continue to monitor  - Comp Metabolic Panel; Future    2. Hyperbilirubinemia  Gilbert's versus other disease process  - Comp Metabolic Panel; Future    3. Abnormal CBC  Recheck CBC also 6 months continue follow-up with hematology oncology  - CBC WITH DIFFERENTIAL; Future        "

## 2024-02-23 ENCOUNTER — OFFICE VISIT (OUTPATIENT)
Dept: MEDICAL GROUP | Facility: LAB | Age: 89
End: 2024-02-23
Payer: MEDICARE

## 2024-02-23 VITALS
WEIGHT: 159 LBS | RESPIRATION RATE: 16 BRPM | HEART RATE: 68 BPM | OXYGEN SATURATION: 96 % | SYSTOLIC BLOOD PRESSURE: 132 MMHG | TEMPERATURE: 96.9 F | DIASTOLIC BLOOD PRESSURE: 64 MMHG | BODY MASS INDEX: 24.96 KG/M2 | HEIGHT: 67 IN

## 2024-02-23 DIAGNOSIS — F41.9 ANXIETY: ICD-10-CM

## 2024-02-23 PROCEDURE — 99213 OFFICE O/P EST LOW 20 MIN: CPT | Performed by: INTERNAL MEDICINE

## 2024-02-23 PROCEDURE — 3075F SYST BP GE 130 - 139MM HG: CPT | Performed by: INTERNAL MEDICINE

## 2024-02-23 PROCEDURE — 3078F DIAST BP <80 MM HG: CPT | Performed by: INTERNAL MEDICINE

## 2024-02-23 RX ORDER — HYDROXYZINE HYDROCHLORIDE 25 MG/1
25 TABLET, FILM COATED ORAL 3 TIMES DAILY PRN
Qty: 30 TABLET | Refills: 0 | Status: SHIPPED | OUTPATIENT
Start: 2024-02-23

## 2024-02-23 ASSESSMENT — FIBROSIS 4 INDEX: FIB4 SCORE: 2.45

## 2024-02-24 NOTE — PROGRESS NOTES
CC: Elias Vincent is a 88 y.o. male is suffering from   Chief Complaint   Patient presents with    Hypertension         SUBJECTIVE:  1. Anxiety  Patient is accompanied by his wife today we have discussed that he is having problems with anxiety exacerbated by his underlying medical condition including aphasia.  Patient and wife relate that their son who is 49 years old has had 4 strokes        Past social, family, history: , supportive wife  Social History     Tobacco Use    Smoking status: Never    Smokeless tobacco: Never   Vaping Use    Vaping Use: Never used   Substance Use Topics    Alcohol use: No    Drug use: No         MEDICATIONS:    Current Outpatient Medications:     hydrOXYzine HCl (ATARAX) 25 MG Tab, Take 1 Tablet by mouth 3 times a day as needed for Itching., Disp: 30 Tablet, Rfl: 0    latanoprost (XALATAN) 0.005 % Solution, , Disp: , Rfl:     NON SPECIFIED, Please vaccinate with Shingrix vaccine., Disp: 1 Each, Rfl: 1    atorvastatin (LIPITOR) 10 MG Tab, Take 1 Tablet by mouth every day. Indications: High Amount of Fats in the Blood, Disp: 90 Tablet, Rfl: 3    omeprazole (PRILOSEC) 20 MG delayed-release capsule, Take 1 Capsule by mouth every day. 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  Heart: No chest pain.  Lungs:  No shortness of Breath.  Psychological: Juan unusual Anxiety depression     PHYSICAL EXAM   Constitutional: Alert, cooperative, not in acute distress.  Cardiovascular:  Rate Rhythm is regular without murmurs rubs clicks.     Thorax & Lungs: Clear to auscultation, no wheezing, rhonchi, or rales  HENT: Normocephalic, Atraumatic.  Eyes: PERRLA, EOMI, Conjunctiva normal.   Neck: Trachia is midline no swelling of the thyroid.   Lymphatic: No lymphadenopathy noted.   Neurologic: Alert & oriented x 3, cranial nerves II through XII are intact, Normal motor function, Normal sensory function, No focal deficits noted.   Psychiatric: Affect normal, Judgment normal, Mood normal.     VITAL SIGNS:/64 (BP Location: Left arm, Patient Position: Sitting, BP Cuff Size: Adult)   Pulse 68   Temp 36.1 °C (96.9 °F)   Resp 16   Ht 1.702 m (5' 7\")   Wt 72.1 kg (159 lb)   SpO2 96%   BMI 24.90 kg/m²     Labs: Reviewed    Assessment:                                                     Plan:    1. Anxiety  Continue current medical therapy add Atarax as needed anxiety  - hydrOXYzine HCl (ATARAX) 25 MG Tab; Take 1 Tablet by mouth 3 times a day as needed for Itching.  Dispense: 30 Tablet; Refill: 0        "

## 2024-04-04 ENCOUNTER — TELEPHONE (OUTPATIENT)
Dept: MEDICAL GROUP | Facility: LAB | Age: 89
End: 2024-04-04
Payer: MEDICARE

## 2024-04-04 NOTE — TELEPHONE ENCOUNTER
Pt's appt cancelled for this week due to provider being OOO. Called pt at the request of  to assit with some medication questions. Spoke with pt. Wife present at time of call. Pt reports 4/5 appt was scheduled to review effectiveness of hydroxyzine for anxiety. Pt reports this medication has not been very helpful for his anxiety. He tried it about 5 times and it made him very drowsy during the day and at night he would still wake up and be unable to fall back asleep. He did not continue to use it. He did want to discuss an alternative medication with pcp. His biggest issue is difficulty sleeping. Pt reports he is managing ok currently with his anxiety and does not feel that he needs to see someone sooner than rescheduled appt for 5/2. Encouraged pt to call if anything changes or wife expresses concerns. Pt agreeable. Updated . Pt added to wait list for pcp.

## 2024-05-02 ENCOUNTER — OFFICE VISIT (OUTPATIENT)
Dept: MEDICAL GROUP | Facility: LAB | Age: 89
End: 2024-05-02
Payer: MEDICARE

## 2024-05-02 VITALS
DIASTOLIC BLOOD PRESSURE: 76 MMHG | OXYGEN SATURATION: 94 % | SYSTOLIC BLOOD PRESSURE: 118 MMHG | BODY MASS INDEX: 24.64 KG/M2 | RESPIRATION RATE: 16 BRPM | HEIGHT: 67 IN | HEART RATE: 73 BPM | WEIGHT: 157 LBS | TEMPERATURE: 96.9 F

## 2024-05-02 DIAGNOSIS — D75.1 POLYCYTHEMIA: ICD-10-CM

## 2024-05-02 PROCEDURE — 3074F SYST BP LT 130 MM HG: CPT | Performed by: INTERNAL MEDICINE

## 2024-05-02 PROCEDURE — 3078F DIAST BP <80 MM HG: CPT | Performed by: INTERNAL MEDICINE

## 2024-05-02 PROCEDURE — 99213 OFFICE O/P EST LOW 20 MIN: CPT | Performed by: INTERNAL MEDICINE

## 2024-05-02 ASSESSMENT — FIBROSIS 4 INDEX: FIB4 SCORE: 2.45

## 2024-05-02 NOTE — LETTER
[facility]  Tj Mancilla D.O.  [id number]  [place] [address]  Fax: [phone]   Authorization for Release/Disclosure of   Protected Health Information   Name: ELIAS MIDDLETON : 1935 SSN: [id number]   Address: [address] [place] [address]  [place] [address] Phone:    [phone] (home) [phone] (work)   I authorize the entity listed below to release/disclose the PHI below to:   [facility]/Tj Mancilla D.O. and Tj Mancilla D.O.   Provider or Entity Name:  Dr Keenan  [facility].    Address   City, State, [facility] Phone:      Fax:     Reason for request: continuity of care   Information to be released:    [  ] LAST COLONOSCOPY,  including any PATH REPORT and follow-up  [  ] LAST FIT/COLOGUARD RESULT [  ] LAST DEXA  [  ] LAST MAMMOGRAM  [  ] LAST PAP  [  ] LAST LABS [  ] RETINA EXAM REPORT  [  ] IMMUNIZATION RECORDS  [ x ] Release all info      [  ] Check here and initial the line next to each item to release ALL health information INCLUDING  _____ Care and treatment for drug and / or alcohol abuse  _____ HIV testing, infection status, or AIDS  _____ Genetic Testing    DATES OF SERVICE OR TIME PERIOD TO BE DISCLOSED: _____________  I understand and acknowledge that:  * This Authorization may be revoked at any time by you in writing, except if your health information has already been used or disclosed.  * Your health information that will be used or disclosed as a result of you signing this authorization could be re-disclosed by the recipient. If this occurs, your re-disclosed health information may no longer be protected by State or Federal laws.  * You may refuse to sign this Authorization. Your refusal will not affect your ability to obtain treatment.  * This Authorization becomes effective upon signing and will  on (date) __________.      If no date is indicated, this Authorization will  one (1) year from the signature date.    Name: Elias  Karol Vincent  Signature: Date:   5/2/2024     PLEASE FAX REQUESTED RECORDS BACK TO: [phone]

## 2024-05-02 NOTE — PROGRESS NOTES
CC: Elias Vincent is a 88 y.o. male is suffering from   Chief Complaint   Patient presents with    Follow-Up         SUBJECTIVE:  1. Polycythemia  Sarath is here for follow-up has a history of polycythemia, we have discussed that he is being [facility] notes have been requested    History of Present Illness      Past social, family, history:   Social History     Tobacco Use    Smoking status: Never    Smokeless tobacco: Never   Vaping Use    Vaping Use: Never used   Substance Use Topics    Alcohol use: No    Drug use: No         MEDICATIONS:    Current Outpatient Medications:     hydrOXYzine HCl (ATARAX) 25 MG Tab, Take 1 Tablet by mouth 3 times a day as needed for Itching., Disp: 30 Tablet, Rfl: 0    latanoprost (XALATAN) 0.005 % Solution, , Disp: , Rfl:     NON SPECIFIED, Please vaccinate with Shingrix vaccine., Disp: 1 Each, Rfl: 1    atorvastatin (LIPITOR) 10 MG Tab, Take 1 Tablet by mouth every day. Indications: High Amount of Fats in the Blood, Disp: 90 Tablet, Rfl: 3    omeprazole (PRILOSEC) 20 MG delayed-release capsule, Take 1 Capsule by mouth every day. 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  Heart: No chest pain.  Lungs:  No shortness of Breath.  Psychological: Juan unusual Anxiety depression     PHYSICAL EXAM   Physical Exam       Constitutional: Alert, cooperative, not in acute distress.  Cardiovascular:  Rate Rhythm is regular without murmurs rubs clicks.     Thorax & Lungs: Clear to auscultation, no wheezing, rhonchi, or rales  HENT: Normocephalic, Atraumatic.  Eyes: PERRLA, EOMI, Conjunctiva normal.   Neck: Trachia is midline no swelling of the thyroid.   Lymphatic: No lymphadenopathy noted.   Neurologic: Alert & oriented x 3, cranial nerves II through XII are intact, Normal motor function, Normal sensory function, No focal deficits noted.   Psychiatric: Affect normal, Judgment normal, Mood normal.     VITAL SIGNS:/76 (BP Location: Left arm, Patient Position: Sitting, BP Cuff Size: Adult)   Pulse 73   Temp 36.1 °C (96.9 °F)   Resp 16   Ht 1.702 m (5' 7\")   Wt 71.2 kg (157 lb)   SpO2 94%   BMI 24.59 kg/m²     Labs: Reviewed    Assessment:                                                     Plan:  Assessment & Plan       1. Polycythemia  Patient with polycythemia also thrombocytopenia, notes requested from Dr. Keenan  - CBC WITH DIFFERENTIAL; Future        "

## 2024-05-08 RX ORDER — OMEPRAZOLE 20 MG/1
CAPSULE, DELAYED RELEASE ORAL
Qty: 90 CAPSULE | Refills: 3 | Status: SHIPPED | OUTPATIENT
Start: 2024-05-08

## 2024-05-08 NOTE — TELEPHONE ENCOUNTER
Received request via: Pharmacy    Was the patient seen in the last year in this department? Yes  5/2/24  Does the patient have an active prescription (recently filled or refills available) for medication(s) requested? No    [facility]    Does the patient have retirement Plus and need 100 day supply (blood pressure, diabetes and cholesterol meds only)? Medication is not for cholesterol, blood pressure or diabetes

## 2024-05-31 LAB
BASOPHILS # BLD AUTO: 0 X10E3/UL (ref 0–0.2)
BASOPHILS NFR BLD AUTO: 0 %
EOSINOPHIL # BLD AUTO: 0 X10E3/UL (ref 0–0.4)
EOSINOPHIL NFR BLD AUTO: 0 %
ERYTHROCYTE [DISTWIDTH] IN BLOOD BY AUTOMATED COUNT: 12.8 % (ref 11.6–15.4)
HCT VFR BLD AUTO: 52.8 % (ref 37.5–51)
HGB BLD-MCNC: 17.3 G/DL (ref 13–17.7)
IMM GRANULOCYTES # BLD AUTO: 0 X10E3/UL (ref 0–0.1)
IMM GRANULOCYTES NFR BLD AUTO: 1 %
IMMATURE CELLS  115398: ABNORMAL
LYMPHOCYTES # BLD AUTO: 2.5 X10E3/UL (ref 0.7–3.1)
LYMPHOCYTES NFR BLD AUTO: 37 %
MCH RBC QN AUTO: 30.1 PG (ref 26.6–33)
MCHC RBC AUTO-ENTMCNC: 32.8 G/DL (ref 31.5–35.7)
MCV RBC AUTO: 92 FL (ref 79–97)
MONOCYTES # BLD AUTO: 2 X10E3/UL (ref 0.1–0.9)
MONOCYTES NFR BLD AUTO: 29 %
MORPHOLOGY BLD-IMP: ABNORMAL
NEUTROPHILS # BLD AUTO: 2.2 X10E3/UL (ref 1.4–7)
NEUTROPHILS NFR BLD AUTO: 33 %
NRBC BLD AUTO-RTO: ABNORMAL %
PLATELET # BLD AUTO: 102 X10E3/UL (ref 150–450)
RBC # BLD AUTO: 5.75 X10E6/UL (ref 4.14–5.8)
WBC # BLD AUTO: 6.8 X10E3/UL (ref 3.4–10.8)

## 2024-06-17 RX ORDER — ATORVASTATIN CALCIUM 10 MG/1
TABLET, FILM COATED ORAL
Qty: 90 TABLET | Refills: 3 | Status: SHIPPED | OUTPATIENT
Start: 2024-06-17

## 2024-06-17 NOTE — TELEPHONE ENCOUNTER
Received request via: Pharmacy    Was the patient seen in the last year in this department? Yes    Does the patient have an active prescription (recently filled or refills available) for medication(s) requested? No    Pharmacy Name: [facility]    Does the patient have correction Plus and need 100 day supply (blood pressure, diabetes and cholesterol meds only)? Patient does not have SCP

## 2024-07-16 ENCOUNTER — OFFICE VISIT (OUTPATIENT)
Dept: MEDICAL GROUP | Facility: LAB | Age: 89
End: 2024-07-16
Payer: MEDICARE

## 2024-07-16 VITALS
SYSTOLIC BLOOD PRESSURE: 130 MMHG | BODY MASS INDEX: 24.96 KG/M2 | HEIGHT: 67 IN | DIASTOLIC BLOOD PRESSURE: 65 MMHG | OXYGEN SATURATION: 92 % | HEART RATE: 74 BPM | RESPIRATION RATE: 18 BRPM | TEMPERATURE: 98.7 F | WEIGHT: 159 LBS

## 2024-07-16 DIAGNOSIS — Z23 NEED FOR TDAP VACCINATION: ICD-10-CM

## 2024-07-16 DIAGNOSIS — D72.820 MONOCLONAL B-CELL LYMPHOCYTOSIS OF UNDETERMINED SIGNIFICANCE: ICD-10-CM

## 2024-07-16 DIAGNOSIS — R79.89 ABNORMAL CBC: ICD-10-CM

## 2024-07-16 DIAGNOSIS — I69.120 APHASIA DUE TO OLD INTRACEREBRAL HEMORRHAGE: ICD-10-CM

## 2024-07-16 PROCEDURE — 3074F SYST BP LT 130 MM HG: CPT | Performed by: INTERNAL MEDICINE

## 2024-07-16 PROCEDURE — 99213 OFFICE O/P EST LOW 20 MIN: CPT | Mod: 25 | Performed by: INTERNAL MEDICINE

## 2024-07-16 PROCEDURE — 90715 TDAP VACCINE 7 YRS/> IM: CPT | Performed by: INTERNAL MEDICINE

## 2024-07-16 PROCEDURE — 3078F DIAST BP <80 MM HG: CPT | Performed by: INTERNAL MEDICINE

## 2024-07-16 PROCEDURE — 90471 IMMUNIZATION ADMIN: CPT | Performed by: INTERNAL MEDICINE

## 2024-07-16 ASSESSMENT — FIBROSIS 4 INDEX: FIB4 SCORE: 2.21

## 2024-08-23 ENCOUNTER — HOSPITAL ENCOUNTER (OUTPATIENT)
Facility: MEDICAL CENTER | Age: 89
End: 2024-08-23
Attending: INTERNAL MEDICINE
Payer: MEDICARE

## 2024-08-23 LAB
ALBUMIN SERPL BCP-MCNC: 4.7 G/DL (ref 3.2–4.9)
ALBUMIN/GLOB SERPL: 2.4 G/DL
ALP SERPL-CCNC: 56 U/L (ref 30–99)
ALT SERPL-CCNC: 24 U/L (ref 2–50)
AMBIGUOUS DTTM AMBI4: NORMAL
ANION GAP SERPL CALC-SCNC: 11 MMOL/L (ref 7–16)
AST SERPL-CCNC: 13 U/L (ref 12–45)
BILIRUB SERPL-MCNC: 2.6 MG/DL (ref 0.1–1.5)
BUN SERPL-MCNC: 18 MG/DL (ref 8–22)
CALCIUM ALBUM COR SERPL-MCNC: 8.9 MG/DL (ref 8.5–10.5)
CALCIUM SERPL-MCNC: 9.5 MG/DL (ref 8.5–10.5)
CHLORIDE SERPL-SCNC: 108 MMOL/L (ref 96–112)
CO2 SERPL-SCNC: 23 MMOL/L (ref 20–33)
CREAT SERPL-MCNC: 0.91 MG/DL (ref 0.5–1.4)
GFR SERPLBLD CREATININE-BSD FMLA CKD-EPI: 82 ML/MIN/1.73 M 2
GLOBULIN SER CALC-MCNC: 2 G/DL (ref 1.9–3.5)
GLUCOSE SERPL-MCNC: 109 MG/DL (ref 65–99)
POTASSIUM SERPL-SCNC: 4.6 MMOL/L (ref 3.6–5.5)
PROT SERPL-MCNC: 6.7 G/DL (ref 6–8.2)
SODIUM SERPL-SCNC: 142 MMOL/L (ref 135–145)

## 2024-08-23 PROCEDURE — 80053 COMPREHEN METABOLIC PANEL: CPT

## 2024-09-18 DIAGNOSIS — N40.0 BENIGN PROSTATIC HYPERPLASIA, UNSPECIFIED WHETHER LOWER URINARY TRACT SYMPTOMS PRESENT: ICD-10-CM

## 2024-09-18 RX ORDER — TAMSULOSIN HYDROCHLORIDE 0.4 MG/1
0.4 CAPSULE ORAL
Qty: 90 CAPSULE | Refills: 3 | Status: SHIPPED | OUTPATIENT
Start: 2024-09-18

## 2024-09-18 NOTE — TELEPHONE ENCOUNTER
Received request via: Pharmacy    Was the patient seen in the last year in this department? Yes7/16/24    Does the patient have an active prescription (recently filled or refills available) for medication(s) requested? No    Pharmacy Name[facility]    Does the patient have long-term Plus and need 100-day supply? (This applies to ALL medications) Patient does not have SCP

## 2024-09-26 ENCOUNTER — TELEPHONE (OUTPATIENT)
Dept: MEDICAL GROUP | Facility: LAB | Age: 89
End: 2024-09-26
Payer: MEDICARE

## 2024-09-26 NOTE — TELEPHONE ENCOUNTER
Telephone call returned to the patient, asked him to bring the billing in so that we can go ahead and start working on getting it corrected for him

## 2024-09-26 NOTE — TELEPHONE ENCOUNTER
Elias called and said that he has received a bill for Tetanus shot on 7/16/24. He said he also had an office visit that day and had a scratch on his leg that he could use to substantiate the need for tetanus to be covered by insurance. He can be reached at [phone].

## 2024-10-14 ENCOUNTER — TELEPHONE (OUTPATIENT)
Dept: MEDICAL GROUP | Facility: LAB | Age: 89
End: 2024-10-14
Payer: MEDICARE

## 2024-10-23 ENCOUNTER — TELEPHONE (OUTPATIENT)
Dept: MEDICAL GROUP | Facility: LAB | Age: 89
End: 2024-10-23
Payer: MEDICARE

## 2024-11-18 ENCOUNTER — OFFICE VISIT (OUTPATIENT)
Dept: MEDICAL GROUP | Facility: LAB | Age: 89
End: 2024-11-18
Payer: MEDICARE

## 2024-11-18 VITALS
TEMPERATURE: 96.9 F | RESPIRATION RATE: 14 BRPM | OXYGEN SATURATION: 96 % | HEART RATE: 60 BPM | SYSTOLIC BLOOD PRESSURE: 126 MMHG | HEIGHT: 67 IN | WEIGHT: 158.8 LBS | DIASTOLIC BLOOD PRESSURE: 66 MMHG | BODY MASS INDEX: 24.92 KG/M2

## 2024-11-18 DIAGNOSIS — R89.9 ABNORMAL LABORATORY TEST: ICD-10-CM

## 2024-11-18 DIAGNOSIS — R79.89 ABNORMAL CBC: ICD-10-CM

## 2024-11-18 LAB
ALBUMIN SERPL-MCNC: 4.8 G/DL (ref 3.7–4.7)
ALP SERPL-CCNC: 55 IU/L (ref 44–121)
ALT SERPL-CCNC: 21 IU/L (ref 0–44)
AST SERPL-CCNC: 10 IU/L (ref 0–40)
BILIRUB SERPL-MCNC: 3 MG/DL (ref 0–1.2)
BUN SERPL-MCNC: 18 MG/DL (ref 8–27)
BUN/CREAT SERPL: 16 (ref 10–24)
CALCIUM SERPL-MCNC: 9.4 MG/DL (ref 8.6–10.2)
CHLORIDE SERPL-SCNC: 105 MMOL/L (ref 96–106)
CO2 SERPL-SCNC: 25 MMOL/L (ref 20–29)
CREAT SERPL-MCNC: 1.11 MG/DL (ref 0.76–1.27)
EGFRCR SERPLBLD CKD-EPI 2021: 64 ML/MIN/1.73
GLOBULIN SER CALC-MCNC: 2 G/DL (ref 1.5–4.5)
GLUCOSE SERPL-MCNC: 105 MG/DL (ref 70–99)
POTASSIUM SERPL-SCNC: 4.2 MMOL/L (ref 3.5–5.2)
PROT SERPL-MCNC: 6.8 G/DL (ref 6–8.5)
SODIUM SERPL-SCNC: 143 MMOL/L (ref 134–144)

## 2024-11-18 PROCEDURE — 3078F DIAST BP <80 MM HG: CPT | Performed by: INTERNAL MEDICINE

## 2024-11-18 PROCEDURE — 99213 OFFICE O/P EST LOW 20 MIN: CPT | Performed by: INTERNAL MEDICINE

## 2024-11-18 PROCEDURE — 3074F SYST BP LT 130 MM HG: CPT | Performed by: INTERNAL MEDICINE

## 2024-11-18 ASSESSMENT — PATIENT HEALTH QUESTIONNAIRE - PHQ9: CLINICAL INTERPRETATION OF PHQ2 SCORE: 0

## 2024-11-18 ASSESSMENT — FIBROSIS 4 INDEX: FIB4 SCORE: 2.26

## 2024-11-18 NOTE — PROGRESS NOTES
CC: Elias Vincent is a 89 y.o. male is suffering from   Chief Complaint   Patient presents with    Follow-Up     6 month fv          SUBJECTIVE:  1. Abnormal CBC  Sarath is here for follow-up we have discussed that he continues to have problems with thrombocytopenia    2. Abnormal laboratory test  Recheck labs in 3 months        Past social, family, history: , retired [employer]  Social History     Tobacco Use    Smoking status: Never    Smokeless tobacco: Never   Vaping Use    Vaping status: Never Used   Substance Use Topics    Alcohol use: No    Drug use: No         MEDICATIONS:    Current Outpatient Medications:     tamsulosin (FLOMAX) 0.4 MG capsule, TAKE 1 CAPSULE 1/2 HOUR AFTER BREAKFAST, Disp: 90 Capsule, Rfl: 3    atorvastatin (LIPITOR) 10 MG Tab, TAKE 1 TABLET DAILY FOR HIGH AMOUNT OF FATS IN THE BLOOD, Disp: 90 Tablet, Rfl: 3    omeprazole (PRILOSEC) 20 MG delayed-release capsule, TAKE 1 CAPSULE DAILY FOR HEARTBURN, Disp: 90 Capsule, Rfl: 3    hydrOXYzine HCl (ATARAX) 25 MG Tab, Take 1 Tablet by mouth 3 times a day as needed for Itching., Disp: 30 Tablet, Rfl: 0    latanoprost (XALATAN) 0.005 % Solution, , Disp: , Rfl:     NON SPECIFIED, Please vaccinate with Shingrix vaccine., Disp: 1 Each, Rfl: 1    VITAMIN D, CHOLECALCIFEROL, PO, Take  by mouth., Disp: , Rfl:     Omega-3 Fatty Acids (OMEGA-3 FISH OIL PO), Take 1 Tab by mouth every day., Disp: , Rfl:     Flaxseed, Linseed, (FLAXSEED OIL PO), Take 1 Tab by mouth every day., Disp: , Rfl:     multivitamin (THERAGRAN) Tab, Take 1 Tab by mouth every day., Disp: , Rfl:     PROBLEMS:  Patient Active Problem List    Diagnosis Date Noted    Cerebral amyloid angiopathy (HCC) 10/31/2023    Mild vascular neurocognitive disorder 10/30/2023    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 06/12/2023    Monoclonal B-cell lymphocytosis of undetermined significance 05/20/2019    Seizure (HCC) 02/24/2019    Thrombocytopenia (HCC) 01/01/2019    Intracranial  "hemorrhage (HCC) 12/31/2018    Dyslipidemia 12/31/2018    GERD (gastroesophageal reflux disease) 12/31/2018    Hyperbilirubinemia 12/31/2018       REVIEW OF SYSTEMS:  Gen.:  No Nausea, Vomiting, fever, Chills.  Heart: No chest pain.  Lungs:  No shortness of Breath.  Psychological: Juan unusual Anxiety depression     PHYSICAL EXAM      Constitutional: Alert, cooperative, not in acute distress.  Cardiovascular:  Rate Rhythm is regular without murmurs rubs clicks.     Thorax & Lungs: Clear to auscultation, no wheezing, rhonchi, or rales  HENT: Normocephalic, Atraumatic.  Eyes: PERRLA, EOMI, Conjunctiva normal.   Neck: Trachia is midline no swelling of the thyroid.   Lymphatic: No lymphadenopathy noted.   Neurologic: Alert & oriented x 3, cranial nerves II through XII are intact, Normal motor function, Normal sensory function, No focal deficits noted.   Psychiatric: Affect normal, Judgment normal, Mood normal.     VITAL SIGNS:/66 (BP Location: Left arm, Patient Position: Sitting, BP Cuff Size: Adult)   Pulse 60   Temp 36.1 °C (96.9 °F) (Temporal)   Resp 14   Ht 1.702 m (5' 7\")   Wt 72 kg (158 lb 12.8 oz)   SpO2 96%   BMI 24.87 kg/m²     Labs: Reviewed    Assessment:                                                     Plan:     1. Abnormal CBC  Abnormal CBC recheck, records [facility] from [facility] Dr. Keenan  - CBC WITH DIFFERENTIAL; Future    2. Abnormal laboratory test  Recheck comprehensive metabolic panel also in 3 months  - Comp Metabolic Panel; Future        "

## 2024-11-18 NOTE — LETTER
[address]  Tj Mancilla D.O.  [address] [facility] [address]  [place] [address]  Fax: [phone]   Authorization for Release/Disclosure of   Protected Health Information   Name: OTTO MIDDLETON : 1935 SSN: [id number]   Address: [address] [place] [address]  [place] [address] Phone:    [phone] (home) [phone] (work)   I authorize the entity listed below to release/disclose the PHI below to:   [facility]/Tj Mancilla D.O. and Tj Mancilla D.O.   Provider or Entity Name:Shlomo Shannon  [facility].    Address   City, [facility] [phone]    [phone]   Reason for request: continuity of care   Information to be released:    [  ] LAST COLONOSCOPY,  including any PATH REPORT and follow-up  [  ] LAST FIT/COLOGUARD RESULT [  ] LAST DEXA  [  ] LAST MAMMOGRAM  [  ] LAST PAP  [  ] LAST LABS [  ] RETINA EXAM REPORT  [  ] IMMUNIZATION RECORDS  [ XXX ] Release all info      [  ] Check here and initial the line next to each item to release ALL health information INCLUDING  _____ Care and treatment for drug and / or alcohol abuse  _____ HIV testing, infection status, or AIDS  _____ Genetic Testing    DATES OF SERVICE OR TIME PERIOD TO BE DISCLOSED: _____________  I understand and acknowledge that:  * This Authorization may be revoked at any time by you in writing, except if your health information has already been used or disclosed.  * Your health information that will be used or disclosed as a result of you signing this authorization could be re-disclosed by the recipient. If this occurs, your re-disclosed health information may no longer be protected by State or Federal laws.  * You may refuse to sign this Authorization. Your refusal will not affect your ability to obtain treatment.  * This Authorization becomes effective upon signing and will  on (date) __________.      If no date is indicated, this Authorization will  one (1) year from the  signature date.    Name: Elias Vincent  Signature:PCP Requesting records  Date:   11/18/2024     PLEASE FAX REQUESTED RECORDS BACK TO: [phone]

## 2025-01-21 ENCOUNTER — APPOINTMENT (OUTPATIENT)
Dept: RADIOLOGY | Facility: MEDICAL CENTER | Age: 89
DRG: 065 | End: 2025-01-21
Attending: STUDENT IN AN ORGANIZED HEALTH CARE EDUCATION/TRAINING PROGRAM
Payer: MEDICARE

## 2025-01-21 ENCOUNTER — HOSPITAL ENCOUNTER (INPATIENT)
Facility: MEDICAL CENTER | Age: 89
LOS: 2 days | DRG: 065 | End: 2025-01-23
Attending: STUDENT IN AN ORGANIZED HEALTH CARE EDUCATION/TRAINING PROGRAM | Admitting: STUDENT IN AN ORGANIZED HEALTH CARE EDUCATION/TRAINING PROGRAM
Payer: MEDICARE

## 2025-01-21 DIAGNOSIS — I62.9 INTRACRANIAL HEMORRHAGE (HCC): ICD-10-CM

## 2025-01-21 DIAGNOSIS — I61.9 CVA (CEREBROVASCULAR ACCIDENT DUE TO INTRACEREBRAL HEMORRHAGE) (HCC): ICD-10-CM

## 2025-01-21 LAB
ALBUMIN SERPL BCP-MCNC: 5.2 G/DL (ref 3.2–4.9)
ALBUMIN/GLOB SERPL: 2.1 G/DL
ALP SERPL-CCNC: 53 U/L (ref 30–99)
ALT SERPL-CCNC: 21 U/L (ref 2–50)
ANION GAP SERPL CALC-SCNC: 13 MMOL/L (ref 7–16)
ANISOCYTOSIS BLD QL SMEAR: ABNORMAL
APPEARANCE UR: CLEAR
AST SERPL-CCNC: 11 U/L (ref 12–45)
BASOPHILS # BLD AUTO: 0 % (ref 0–1.8)
BASOPHILS # BLD: 0 K/UL (ref 0–0.12)
BILIRUB SERPL-MCNC: 3.4 MG/DL (ref 0.1–1.5)
BILIRUB UR QL STRIP.AUTO: NEGATIVE
BUN SERPL-MCNC: 21 MG/DL (ref 8–22)
CALCIUM ALBUM COR SERPL-MCNC: 9 MG/DL (ref 8.5–10.5)
CALCIUM SERPL-MCNC: 10 MG/DL (ref 8.5–10.5)
CFT BLD TEG: 7.6 MIN (ref 4.6–9.1)
CFT P HPASE BLD TEG: 6.1 MIN (ref 4.3–8.3)
CHLORIDE SERPL-SCNC: 105 MMOL/L (ref 96–112)
CHOLEST SERPL-MCNC: 85 MG/DL (ref 100–199)
CLOT ANGLE BLD TEG: 63.4 DEGREES (ref 63–78)
CLOT LYSIS 30M P MA LENFR BLD TEG: 0 % (ref 0–2.6)
CO2 SERPL-SCNC: 24 MMOL/L (ref 20–33)
COLOR UR: YELLOW
CREAT SERPL-MCNC: 0.96 MG/DL (ref 0.5–1.4)
CT.EXTRINSIC BLD ROTEM: 2.4 MIN (ref 0.8–2.1)
EKG IMPRESSION: NORMAL
EOSINOPHIL # BLD AUTO: 0 K/UL (ref 0–0.51)
EOSINOPHIL NFR BLD: 0 % (ref 0–6.9)
ERYTHROCYTE [DISTWIDTH] IN BLOOD BY AUTOMATED COUNT: 44.8 FL (ref 35.9–50)
EST. AVERAGE GLUCOSE BLD GHB EST-MCNC: 111 MG/DL
GFR SERPLBLD CREATININE-BSD FMLA CKD-EPI: 76 ML/MIN/1.73 M 2
GLOBULIN SER CALC-MCNC: 2.5 G/DL (ref 1.9–3.5)
GLUCOSE SERPL-MCNC: 90 MG/DL (ref 65–99)
GLUCOSE UR STRIP.AUTO-MCNC: NEGATIVE MG/DL
HBA1C MFR BLD: 5.5 % (ref 4–5.6)
HCT VFR BLD AUTO: 55.7 % (ref 42–52)
HDLC SERPL-MCNC: 29 MG/DL
HGB BLD-MCNC: 18.2 G/DL (ref 14–18)
INR PPP: 1.28 (ref 0.87–1.13)
KETONES UR STRIP.AUTO-MCNC: NEGATIVE MG/DL
LDLC SERPL CALC-MCNC: 38 MG/DL
LEUKOCYTE ESTERASE UR QL STRIP.AUTO: NEGATIVE
LYMPHOCYTES # BLD AUTO: 1.31 K/UL (ref 1–4.8)
LYMPHOCYTES NFR BLD: 15.8 % (ref 22–41)
MANUAL DIFF BLD: NORMAL
MCF BLD TEG: 46 MM (ref 52–69)
MCF.PLATELET INHIB BLD ROTEM: 11.5 MM (ref 15–32)
MCH RBC QN AUTO: 30.5 PG (ref 27–33)
MCHC RBC AUTO-ENTMCNC: 32.7 G/DL (ref 32.3–36.5)
MCV RBC AUTO: 93.5 FL (ref 81.4–97.8)
MICRO URNS: NORMAL
MICROCYTES BLD QL SMEAR: ABNORMAL
MONOCYTES # BLD AUTO: 3.49 K/UL (ref 0–0.85)
MONOCYTES NFR BLD AUTO: 42.1 % (ref 0–13.4)
MORPHOLOGY BLD-IMP: NORMAL
NEUTROPHILS # BLD AUTO: 3.49 K/UL (ref 1.82–7.42)
NEUTROPHILS NFR BLD: 42.1 % (ref 44–72)
NITRITE UR QL STRIP.AUTO: NEGATIVE
NRBC # BLD AUTO: 0 K/UL
NRBC BLD-RTO: 0 /100 WBC (ref 0–0.2)
PA AA BLD-ACNC: 19.8 % (ref 0–11)
PA ADP BLD-ACNC: 57.9 % (ref 0–17)
PATH REV: NORMAL
PATH REV: NORMAL
PH UR STRIP.AUTO: 5 [PH] (ref 5–8)
PLATELET # BLD AUTO: 114 K/UL (ref 164–446)
PLATELET BLD QL SMEAR: NORMAL
PMV BLD AUTO: 12.1 FL (ref 9–12.9)
POTASSIUM SERPL-SCNC: 5.4 MMOL/L (ref 3.6–5.5)
PROT SERPL-MCNC: 7.7 G/DL (ref 6–8.2)
PROT UR QL STRIP: NEGATIVE MG/DL
PROTHROMBIN TIME: 16 SEC (ref 12–14.6)
RBC # BLD AUTO: 5.96 M/UL (ref 4.7–6.1)
RBC BLD AUTO: PRESENT
RBC UR QL AUTO: NEGATIVE
SALICYLATES SERPL-MCNC: <1 MG/DL (ref 15–25)
SODIUM SERPL-SCNC: 142 MMOL/L (ref 135–145)
SODIUM SERPL-SCNC: 142 MMOL/L (ref 135–145)
SP GR UR STRIP.AUTO: 1.02
TEG ALGORITHM TGALG: ABNORMAL
TRIGL SERPL-MCNC: 92 MG/DL (ref 0–149)
TROPONIN T SERPL-MCNC: 23 NG/L (ref 6–19)
UROBILINOGEN UR STRIP.AUTO-MCNC: 0.2 EU/DL
WBC # BLD AUTO: 8.3 K/UL (ref 4.8–10.8)

## 2025-01-21 PROCEDURE — 85576 BLOOD PLATELET AGGREGATION: CPT | Mod: 91

## 2025-01-21 PROCEDURE — 99291 CRITICAL CARE FIRST HOUR: CPT

## 2025-01-21 PROCEDURE — 700117 HCHG RX CONTRAST REV CODE 255: Performed by: STUDENT IN AN ORGANIZED HEALTH CARE EDUCATION/TRAINING PROGRAM

## 2025-01-21 PROCEDURE — 80053 COMPREHEN METABOLIC PANEL: CPT

## 2025-01-21 PROCEDURE — 70498 CT ANGIOGRAPHY NECK: CPT

## 2025-01-21 PROCEDURE — 85384 FIBRINOGEN ACTIVITY: CPT | Mod: 91

## 2025-01-21 PROCEDURE — 81003 URINALYSIS AUTO W/O SCOPE: CPT

## 2025-01-21 PROCEDURE — 93005 ELECTROCARDIOGRAM TRACING: CPT | Mod: TC | Performed by: STUDENT IN AN ORGANIZED HEALTH CARE EDUCATION/TRAINING PROGRAM

## 2025-01-21 PROCEDURE — 84295 ASSAY OF SERUM SODIUM: CPT

## 2025-01-21 PROCEDURE — 85027 COMPLETE CBC AUTOMATED: CPT

## 2025-01-21 PROCEDURE — 36415 COLL VENOUS BLD VENIPUNCTURE: CPT

## 2025-01-21 PROCEDURE — 80503 PATH CLIN CONSLTJ SF 5-20: CPT

## 2025-01-21 PROCEDURE — 85007 BL SMEAR W/DIFF WBC COUNT: CPT

## 2025-01-21 PROCEDURE — 83036 HEMOGLOBIN GLYCOSYLATED A1C: CPT

## 2025-01-21 PROCEDURE — 85610 PROTHROMBIN TIME: CPT

## 2025-01-21 PROCEDURE — 80061 LIPID PANEL: CPT

## 2025-01-21 PROCEDURE — 84484 ASSAY OF TROPONIN QUANT: CPT

## 2025-01-21 PROCEDURE — 85347 COAGULATION TIME ACTIVATED: CPT

## 2025-01-21 PROCEDURE — 70496 CT ANGIOGRAPHY HEAD: CPT

## 2025-01-21 PROCEDURE — 770022 HCHG ROOM/CARE - ICU (200)

## 2025-01-21 PROCEDURE — 80179 DRUG ASSAY SALICYLATE: CPT

## 2025-01-21 PROCEDURE — 99222 1ST HOSP IP/OBS MODERATE 55: CPT | Performed by: PSYCHIATRY & NEUROLOGY

## 2025-01-21 PROCEDURE — 99291 CRITICAL CARE FIRST HOUR: CPT | Performed by: STUDENT IN AN ORGANIZED HEALTH CARE EDUCATION/TRAINING PROGRAM

## 2025-01-21 RX ORDER — LORAZEPAM 2 MG/ML
2 INJECTION INTRAMUSCULAR
Status: DISCONTINUED | OUTPATIENT
Start: 2025-01-21 | End: 2025-01-23 | Stop reason: HOSPADM

## 2025-01-21 RX ORDER — IPRATROPIUM BROMIDE AND ALBUTEROL SULFATE 2.5; .5 MG/3ML; MG/3ML
3 SOLUTION RESPIRATORY (INHALATION)
Status: DISCONTINUED | OUTPATIENT
Start: 2025-01-21 | End: 2025-01-23 | Stop reason: HOSPADM

## 2025-01-21 RX ORDER — ATORVASTATIN CALCIUM 10 MG/1
10 TABLET, FILM COATED ORAL DAILY
Status: CANCELLED | OUTPATIENT
Start: 2025-01-22

## 2025-01-21 RX ORDER — ONDANSETRON 2 MG/ML
4 INJECTION INTRAMUSCULAR; INTRAVENOUS EVERY 4 HOURS PRN
Status: DISCONTINUED | OUTPATIENT
Start: 2025-01-21 | End: 2025-01-23 | Stop reason: HOSPADM

## 2025-01-21 RX ORDER — LATANOPROST 50 UG/ML
1 SOLUTION/ DROPS OPHTHALMIC EVERY EVENING
Status: CANCELLED | OUTPATIENT
Start: 2025-01-21

## 2025-01-21 RX ORDER — LATANOPROST 50 UG/ML
1 SOLUTION/ DROPS OPHTHALMIC NIGHTLY
COMMUNITY

## 2025-01-21 RX ORDER — HYDROCODONE BITARTRATE AND ACETAMINOPHEN 5; 325 MG/1; MG/1
1 TABLET ORAL ONCE
Status: DISCONTINUED | OUTPATIENT
Start: 2025-01-21 | End: 2025-01-21

## 2025-01-21 RX ORDER — ACETAMINOPHEN 325 MG/1
650 TABLET ORAL EVERY 4 HOURS PRN
Status: DISCONTINUED | OUTPATIENT
Start: 2025-01-21 | End: 2025-01-23 | Stop reason: HOSPADM

## 2025-01-21 RX ORDER — VITAMIN E 268 MG
1000 CAPSULE ORAL DAILY
COMMUNITY

## 2025-01-21 RX ORDER — TAMSULOSIN HYDROCHLORIDE 0.4 MG/1
0.4 CAPSULE ORAL
Status: CANCELLED | OUTPATIENT
Start: 2025-01-21

## 2025-01-21 RX ORDER — VITAMIN B COMPLEX
1000 TABLET ORAL DAILY
COMMUNITY

## 2025-01-21 RX ORDER — HYDRALAZINE HYDROCHLORIDE 20 MG/ML
10 INJECTION INTRAMUSCULAR; INTRAVENOUS EVERY 4 HOURS PRN
Status: DISCONTINUED | OUTPATIENT
Start: 2025-01-21 | End: 2025-01-23 | Stop reason: HOSPADM

## 2025-01-21 RX ORDER — ATORVASTATIN CALCIUM 10 MG/1
10 TABLET, FILM COATED ORAL DAILY
Status: DISCONTINUED | OUTPATIENT
Start: 2025-01-22 | End: 2025-01-23 | Stop reason: HOSPADM

## 2025-01-21 RX ORDER — TAMSULOSIN HYDROCHLORIDE 0.4 MG/1
0.4 CAPSULE ORAL
Status: DISCONTINUED | OUTPATIENT
Start: 2025-01-22 | End: 2025-01-23 | Stop reason: HOSPADM

## 2025-01-21 RX ORDER — ONDANSETRON 4 MG/1
4 TABLET, ORALLY DISINTEGRATING ORAL EVERY 4 HOURS PRN
Status: DISCONTINUED | OUTPATIENT
Start: 2025-01-21 | End: 2025-01-23 | Stop reason: HOSPADM

## 2025-01-21 RX ORDER — CHLORAL HYDRATE 500 MG
1000 CAPSULE ORAL DAILY
COMMUNITY

## 2025-01-21 RX ORDER — ACETAMINOPHEN 650 MG/1
650 SUPPOSITORY RECTAL EVERY 4 HOURS PRN
Status: DISCONTINUED | OUTPATIENT
Start: 2025-01-21 | End: 2025-01-23 | Stop reason: HOSPADM

## 2025-01-21 RX ADMIN — IOHEXOL 80 ML: 350 INJECTION, SOLUTION INTRAVENOUS at 17:15

## 2025-01-21 ASSESSMENT — FIBROSIS 4 INDEX
FIB4 SCORE: 1.86
FIB4 SCORE: 1.83

## 2025-01-21 NOTE — ED TRIAGE NOTES
"Chief Complaint   Patient presents with    Other     Pt reports having vision issues since Saturday with increase in severity today. Pt is having a hard time describing the vision issues but reports something is wrong with it. Pt also reports \"I just feel lousy like something is wrong\"     Pt ambulatory to triage for above complaint. Pt reports history of hemorrhagic stroke about 5 years ago and was concerned for continued vision issues and feeling \"off\". Pt denies any pain. Pt reports he can still see but it \"is just weird and all over the place\".  No other noted symptoms at this time.   "

## 2025-01-21 NOTE — ED PROVIDER NOTES
"  Scribed for Dr. Dagoberto Shell D.O. by Bogdan Fuchs. 1/21/2025  2:42 PM    Primary Care Provider: Tj Mancilla D.O.    CHIEF COMPLAINT  Chief Complaint   Patient presents with    Other     Pt reports having vision issues since Saturday with increase in severity today. Pt is having a hard time describing the vision issues but reports something is wrong with it. Pt also reports \"I just feel lousy like something is wrong\"     EXTERNAL RECORDS REVIEWED  Inpatient Notes discharge summary from 1/4/2019 patient admitted for intraparenchymal hemorrhage    HPI/ROS  LIMITATION TO HISTORY   Select: : None    OUTSIDE HISTORIAN(S):  none    Elias Vincent is a 89 y.o. male who presents to the ED for malaise onset three days ago. The patient reports transient vision changes including flashes today, but the wife reports this was also present yesterday. He denies any vomiting, diarrheal, shortness of breath, chest pain, headache, current vision changes. He denies any recent falls. He denies any blood thinners. The patient has a history of hemorrhagic stroke requiring inpatient care in the ICU. They note this did not require surgical intervention.     PAST MEDICAL HISTORY  Past Medical History:   Diagnosis Date    Basal cell adenocarcinoma     Blood clot in vein     Left arm - had to have his rib removed.     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    Peritonitis (HCC)     Ruptured appendix in the 1960s     SURGICAL HISTORY  Past Surgical History:   Procedure Laterality Date    APPENDECTOMY      CHOLECYSTECTOMY      RIB RESECTION  1970s    Removed due to a blood clot in his shoulder     FAMILY HISTORY  Family History   Problem Relation Age of Onset    Heart Disease Mother     Cancer Father         Brain    Cancer Sister     Heart Disease Brother     Stroke Sister     No Known Problems Son     No Known " "Problems Son     No Known Problems Son     No Known Problems Son     No Known Problems Daughter     No Known Problems Daughter     No Known Problems Daughter     Dementia Maternal Aunt      SOCIAL HISTORY   reports that he has never smoked. He has never used smokeless tobacco. He reports that he does not drink alcohol and does not use drugs.    CURRENT MEDICATIONS  Previous Medications    ATORVASTATIN (LIPITOR) 10 MG TAB    TAKE 1 TABLET DAILY FOR HIGH AMOUNT OF FATS IN THE BLOOD    FLAXSEED, LINSEED, (FLAXSEED OIL PO)    Take 1 Tab by mouth every day.    HYDROXYZINE HCL (ATARAX) 25 MG TAB    Take 1 Tablet by mouth 3 times a day as needed for Itching.    LATANOPROST (XALATAN) 0.005 % SOLUTION        MULTIVITAMIN (THERAGRAN) TAB    Take 1 Tab by mouth every day.    NON SPECIFIED    Please vaccinate with Shingrix vaccine.    OMEGA-3 FATTY ACIDS (OMEGA-3 FISH OIL PO)    Take 1 Tab by mouth every day.    OMEPRAZOLE (PRILOSEC) 20 MG DELAYED-RELEASE CAPSULE    TAKE 1 CAPSULE DAILY FOR HEARTBURN    TAMSULOSIN (FLOMAX) 0.4 MG CAPSULE    TAKE 1 CAPSULE 1/2 HOUR AFTER BREAKFAST    VITAMIN D, CHOLECALCIFEROL, PO    Take  by mouth.     ALLERGIES  Nsaids    PHYSICAL EXAM  BP (!) 149/62   Pulse 72   Temp 36.1 °C (96.9 °F) (Temporal)   Resp 16   Ht 1.702 m (5' 7\")   Wt 71.8 kg (158 lb 4.6 oz)   SpO2 96%   BMI 24.79 kg/m²   Constitutional: Alert in no apparent distress.  HENT: No signs of trauma, Bilateral external ears normal, Nose normal.   Eyes: Pupils are equal and reactive, Conjunctiva normal, Non-icteric.   Neck: Normal range of motion, No tenderness, Supple, No stridor.   Cardiovascular: Regular rate and rhythm, no murmurs.   Thorax & Lungs: Normal breath sounds, No respiratory distress, No wheezing, No chest tenderness.   Abdomen: Bowel sounds normal, Soft, No tenderness, No masses, No pulsatile masses.   Skin: Warm, Dry, No erythema, No rash.   Back: No bony tenderness, No CVA tenderness.   Extremities: Intact " distal pulses, No edema, No tenderness, No cyanosis  Musculoskeletal: Good range of motion in all major joints. No tenderness to palpation or major deformities noted.   Neurologic: Normal speech, visual fields intact, No gaze deviation, no facial asymmetry, 5/5 strength in all four extremities, sensation intact to light touch to the face and all four extremities, normal finger to nose and heel to shin.       DIAGNOSTIC STUDIES & PROCEDURES    Labs:   Results for orders placed or performed during the hospital encounter of 01/21/25   CBC WITH DIFFERENTIAL    Collection Time: 01/21/25  1:42 PM   Result Value Ref Range    WBC 8.3 4.8 - 10.8 K/uL    RBC 5.96 4.70 - 6.10 M/uL    Hemoglobin 18.2 (H) 14.0 - 18.0 g/dL    Hematocrit 55.7 (H) 42.0 - 52.0 %    MCV 93.5 81.4 - 97.8 fL    MCH 30.5 27.0 - 33.0 pg    MCHC 32.7 32.3 - 36.5 g/dL    RDW 44.8 35.9 - 50.0 fL    Platelet Count 114 (L) 164 - 446 K/uL    MPV 12.1 9.0 - 12.9 fL    Neutrophils-Polys 42.10 (L) 44.00 - 72.00 %    Lymphocytes 15.80 (L) 22.00 - 41.00 %    Monocytes 42.10 (H) 0.00 - 13.40 %    Eosinophils 0.00 0.00 - 6.90 %    Basophils 0.00 0.00 - 1.80 %    Nucleated RBC 0.00 0.00 - 0.20 /100 WBC    Neutrophils (Absolute) 3.49 1.82 - 7.42 K/uL    Lymphs (Absolute) 1.31 1.00 - 4.80 K/uL    Monos (Absolute) 3.49 (H) 0.00 - 0.85 K/uL    Eos (Absolute) 0.00 0.00 - 0.51 K/uL    Baso (Absolute) 0.00 0.00 - 0.12 K/uL    NRBC (Absolute) 0.00 K/uL    Anisocytosis 1+     Microcytosis 1+    Comp Metabolic Panel    Collection Time: 01/21/25  1:42 PM   Result Value Ref Range    Sodium 142 135 - 145 mmol/L    Potassium 5.4 3.6 - 5.5 mmol/L    Chloride 105 96 - 112 mmol/L    Co2 24 20 - 33 mmol/L    Anion Gap 13.0 7.0 - 16.0    Glucose 90 65 - 99 mg/dL    Bun 21 8 - 22 mg/dL    Creatinine 0.96 0.50 - 1.40 mg/dL    Calcium 10.0 8.5 - 10.5 mg/dL    Correct Calcium 9.0 8.5 - 10.5 mg/dL    AST(SGOT) 11 (L) 12 - 45 U/L    ALT(SGPT) 21 2 - 50 U/L    Alkaline Phosphatase 53 30 - 99  U/L    Total Bilirubin 3.4 (H) 0.1 - 1.5 mg/dL    Albumin 5.2 (H) 3.2 - 4.9 g/dL    Total Protein 7.7 6.0 - 8.2 g/dL    Globulin 2.5 1.9 - 3.5 g/dL    A-G Ratio 2.1 g/dL   ESTIMATED GFR    Collection Time: 25  1:42 PM   Result Value Ref Range    GFR (CKD-EPI) 76 >60 mL/min/1.73 m 2   DIFFERENTIAL MANUAL    Collection Time: 25  1:42 PM   Result Value Ref Range    Manual Diff Status PERFORMED    PERIPHERAL SMEAR REVIEW    Collection Time: 25  1:42 PM   Result Value Ref Range    Peripheral Smear Review see below    PATH INTERP HEME    Collection Time: 25  1:42 PM   Result Value Ref Range    Interpretation See Comment     Reviewed By Hematology see below    PLATELET ESTIMATE    Collection Time: 25  1:42 PM   Result Value Ref Range    Plt Estimation Decreased    MORPHOLOGY    Collection Time: 25  1:42 PM   Result Value Ref Range    RBC Morphology Present    Salicylate    Collection Time: 25  2:30 PM   Result Value Ref Range    Salicylates, Quant. <1.0 (L) 15.0 - 25.0 mg/dL   TROPONIN    Collection Time: 25  2:30 PM   Result Value Ref Range    Troponin T 23 (H) 6 - 19 ng/L   Hemoglobin A1C prior to intervention or upon arrival to the unit    Collection Time: 25  2:30 PM   Result Value Ref Range    Glycohemoglobin 5.5 4.0 - 5.6 %    Est Avg Glucose 111 mg/dL   Lipid Profile    Collection Time: 25  2:30 PM   Result Value Ref Range    Cholesterol,Tot 85 (L) 100 - 199 mg/dL    Triglycerides 92 0 - 149 mg/dL    HDL 29 (A) >=40 mg/dL    LDL 38 <100 mg/dL   EKG    Collection Time: 25  2:52 PM   Result Value Ref Range    Report       [facility] Emergency Dept.    Test Date:  2025  Pt Name:    OTTO MIDDLETON               Department: ER  MRN:        [id number]                      Room:       [facility]  Gender:     Male                         Technician: [id number]  :        [id number]                   Requested By:ROLDAN LOWRY  Order #:     [id number]                    Reading MD:    Measurements  Intervals                                Axis  Rate:       60                           P:          -26  MD:         198                          QRS:        -63  QRSD:       103                          T:          25  QT:         430  QTc:        430    Interpretive Statements  Sinus rhythm  Left anterior fascicular block  Anteroseptal infarct, old  Compared to ECG 12/31/2018 13:48:06  Sinus bradycardia no longer present  Possible ischemia no longer present  Myocardial infarct finding still present     Prothrombin Time    Collection Time: 01/21/25  3:25 PM   Result Value Ref Range    PT 16.0 (H) 12.0 - 14.6 sec    INR 1.28 (H) 0.87 - 1.13   URINALYSIS    Collection Time: 01/21/25  3:38 PM    Specimen: Urine   Result Value Ref Range    Color Yellow     Character Clear     Specific Gravity 1.019 <1.035    Ph 5.0 5.0 - 8.0    Glucose Negative Negative mg/dL    Ketones Negative Negative mg/dL    Protein Negative Negative mg/dL    Bilirubin Negative Negative    Urobilinogen, Urine 0.2 <=1.0 EU/dL    Nitrite Negative Negative    Leukocyte Esterase Negative Negative    Occult Blood Negative Negative    Micro Urine Req see below    Sodium Serum (NA)    Collection Time: 01/21/25  6:55 PM   Result Value Ref Range    Sodium 142 135 - 145 mmol/L   Platelet Mapping with Basic TEG    Collection Time: 01/21/25  6:55 PM   Result Value Ref Range    Reaction Time Initial-R 7.6 4.6 - 9.1 min    React Time Initial Hep 6.1 4.3 - 8.3 min    Clot Kinetics-K 2.4 (H) 0.8 - 2.1 min    Clot Angle-Angle 63.4 63.0 - 78.0 degrees    Maximum Clot Strength-MA 46.0 (L) 52.0 - 69.0 mm    TEG Functional Fibrinogen(MA) 11.5 (L) 15.0 - 32.0 mm    Lysis 30 minutes-LY30 0.0 0.0 - 2.6 %    % Inhibition ADP 57.9 (H) 0.0 - 17.0 %    % Inhibition AA 19.8 (H) 0.0 - 11.0 %    TEG Algorithm Link Algorithm       All labs reviewed by me.    Radiology:   The attending Emergency Physician has  independently interpreted the diagnostic imaging associated with this visit and is awaiting the final reading from the radiologist, which will be displayed below.    Preliminary interpretation is a follows: Intracranial hemorrhage  Radiologist interpretation:    CT-CTA NECK WITH & W/O-POST PROCESSING   Final Result      No high-grade stenosis, large vessel occlusion, aneurysm or dissection.      CT-CTA HEAD WITH & W/O-POST PROCESS   Final Result      1.  2.5 x 2.0 cm left parieto-occipital parenchymal hemorrhage with mild surrounding vasogenic edema.   2.  No large vessel occlusion or aneurysm.   3.  White matter disease, likely microvascular ischemic change.   4.  Atrophy.      Dr. Tellez discussed these findings with Dr Moi Shell at 1/21/2025 5:10 PM         COURSE & MEDICAL DECISION MAKING     INITIAL ASSESSMENT AND PLAN  Care Narrative: Patient presenting with intermittent confusion, lethargy and transient visual changes.  Patient is currently asymptomatic at his baseline according to his wife.  Patient has a normal neurologic exam.  Given patient's history of intracranial hemorrhage will obtain CTA head and neck as well as blood work to assess for gross hematologic metabolic derangements.      2:42 PM - Patient seen and evaluated at bedside. This is a 89 y.o. man who presents with transient vision changes for the last two days and malaise for the last three days. Discussed plan of care, including reevaluation following pending diagnostic workup. Patient agrees to plan of care.     CTA notable for parietal occipital intraparenchymal hemorrhage.  Spoke with neurosurgery and stroke neurology who recommended admission to ICU for close blood pressure control and neurologic checks.  No plans for intervention.    6:23 PM - I discussed the patient's case and the above findings with Dr. Alcazar (Critical Care) who agrees to evaluate the patient for admission.     CRITICAL CARE  The very real possibilty of a deterioration  of this patient's condition required the highest level of my preparedness for sudden, emergent intervention.  I provided critical care services, which included medication orders, frequent reevaluations of the patient's condition and response to treatment, ordering and reviewing test results, and discussing the case with various consultants.  The critical care time associated with the care of the patient was 40 minutes. Review chart for interventions. This time is exclusive of any other billable procedures.                    DISPOSITION AND DISCUSSIONS  I have discussed management of the patient with the following physicians and SHERYL's: Dr. Alcazar (Critical Care), stroke neurology, neurosurgery    Discussion of management with other [facility] or appropriate source(s): None     Barriers to care at this time, including but not limited to:  None .       DISPOSITION:  Patient will be hospitalized by Dr. Alcazar (Critical Care)    FINAL IMPRESSION   1. Intracranial hemorrhage (HCC)    2. CVA (cerebrovascular accident due to intracerebral hemorrhage) (HCC)         IBogdan (Scribe), am scribing for, and in the presence of, Dr. Dagoberto Shell D.O.     Electronically signed by: Bogdan Fuchs (Scribe), 1/21/2025    Dr. Dagoberto ROSENTHAL D.O. personally performed the services described in this documentation, as scribed by Bogdan Fuchs in my presence, and it is both accurate and complete.    The note accurately reflects work and decisions made by me.  Dagoberto Shell D.O.  1/21/2025  9:46 PM

## 2025-01-21 NOTE — ED NOTES
Visual acuity completed. Patient does not wear glasses or contacts   LEFT: 20/50 -1  RIGHT: 20/50 -3  BOTH: 20/40 -2

## 2025-01-22 LAB
ALBUMIN SERPL BCP-MCNC: 4.3 G/DL (ref 3.2–4.9)
ALBUMIN/GLOB SERPL: 2.2 G/DL
ALP SERPL-CCNC: 48 U/L (ref 30–99)
ALT SERPL-CCNC: 19 U/L (ref 2–50)
ANION GAP SERPL CALC-SCNC: 8 MMOL/L (ref 7–16)
ANISOCYTOSIS BLD QL SMEAR: ABNORMAL
AST SERPL-CCNC: 13 U/L (ref 12–45)
BASOPHILS # BLD AUTO: 0 % (ref 0–1.8)
BASOPHILS # BLD: 0 K/UL (ref 0–0.12)
BILIRUB SERPL-MCNC: 3.5 MG/DL (ref 0.1–1.5)
BUN SERPL-MCNC: 18 MG/DL (ref 8–22)
CALCIUM ALBUM COR SERPL-MCNC: 8.7 MG/DL (ref 8.5–10.5)
CALCIUM SERPL-MCNC: 8.9 MG/DL (ref 8.5–10.5)
CHLORIDE SERPL-SCNC: 106 MMOL/L (ref 96–112)
CO2 SERPL-SCNC: 26 MMOL/L (ref 20–33)
CREAT SERPL-MCNC: 1.04 MG/DL (ref 0.5–1.4)
EOSINOPHIL # BLD AUTO: 0.13 K/UL (ref 0–0.51)
EOSINOPHIL NFR BLD: 1.7 % (ref 0–6.9)
ERYTHROCYTE [DISTWIDTH] IN BLOOD BY AUTOMATED COUNT: 44.2 FL (ref 35.9–50)
GFR SERPLBLD CREATININE-BSD FMLA CKD-EPI: 69 ML/MIN/1.73 M 2
GLOBULIN SER CALC-MCNC: 2 G/DL (ref 1.9–3.5)
GLUCOSE SERPL-MCNC: 92 MG/DL (ref 65–99)
HCT VFR BLD AUTO: 49.7 % (ref 42–52)
HGB BLD-MCNC: 16.7 G/DL (ref 14–18)
LYMPHOCYTES # BLD AUTO: 1.3 K/UL (ref 1–4.8)
LYMPHOCYTES NFR BLD: 16.4 % (ref 22–41)
MAGNESIUM SERPL-MCNC: 2.1 MG/DL (ref 1.5–2.5)
MANUAL DIFF BLD: NORMAL
MCH RBC QN AUTO: 30.8 PG (ref 27–33)
MCHC RBC AUTO-ENTMCNC: 33.6 G/DL (ref 32.3–36.5)
MCV RBC AUTO: 91.7 FL (ref 81.4–97.8)
MICROCYTES BLD QL SMEAR: ABNORMAL
MONOCYTES # BLD AUTO: 2.59 K/UL (ref 0–0.85)
MONOCYTES NFR BLD AUTO: 32.8 % (ref 0–13.4)
MORPHOLOGY BLD-IMP: NORMAL
NEUTROPHILS # BLD AUTO: 3.88 K/UL (ref 1.82–7.42)
NEUTROPHILS NFR BLD: 49.1 % (ref 44–72)
NRBC # BLD AUTO: 0 K/UL
NRBC BLD-RTO: 0 /100 WBC (ref 0–0.2)
PLATELET # BLD AUTO: 105 K/UL (ref 164–446)
PLATELET BLD QL SMEAR: NORMAL
PMV BLD AUTO: 11.6 FL (ref 9–12.9)
POTASSIUM SERPL-SCNC: 3.9 MMOL/L (ref 3.6–5.5)
PROT SERPL-MCNC: 6.3 G/DL (ref 6–8.2)
RBC # BLD AUTO: 5.42 M/UL (ref 4.7–6.1)
RBC BLD AUTO: PRESENT
SODIUM SERPL-SCNC: 139 MMOL/L (ref 135–145)
SODIUM SERPL-SCNC: 140 MMOL/L (ref 135–145)
SODIUM SERPL-SCNC: 141 MMOL/L (ref 135–145)
WBC # BLD AUTO: 7.9 K/UL (ref 4.8–10.8)

## 2025-01-22 PROCEDURE — 99497 ADVNCD CARE PLAN 30 MIN: CPT | Performed by: NURSE PRACTITIONER

## 2025-01-22 PROCEDURE — 80053 COMPREHEN METABOLIC PANEL: CPT

## 2025-01-22 PROCEDURE — 99232 SBSQ HOSP IP/OBS MODERATE 35: CPT | Performed by: PSYCHIATRY & NEUROLOGY

## 2025-01-22 PROCEDURE — 97162 PT EVAL MOD COMPLEX 30 MIN: CPT

## 2025-01-22 PROCEDURE — 92523 SPEECH SOUND LANG COMPREHEN: CPT

## 2025-01-22 PROCEDURE — 97165 OT EVAL LOW COMPLEX 30 MIN: CPT

## 2025-01-22 PROCEDURE — 99222 1ST HOSP IP/OBS MODERATE 55: CPT | Mod: 25 | Performed by: NURSE PRACTITIONER

## 2025-01-22 PROCEDURE — 700102 HCHG RX REV CODE 250 W/ 637 OVERRIDE(OP): Performed by: STUDENT IN AN ORGANIZED HEALTH CARE EDUCATION/TRAINING PROGRAM

## 2025-01-22 PROCEDURE — 84295 ASSAY OF SERUM SODIUM: CPT

## 2025-01-22 PROCEDURE — 99291 CRITICAL CARE FIRST HOUR: CPT | Mod: GC | Performed by: INTERNAL MEDICINE

## 2025-01-22 PROCEDURE — 85027 COMPLETE CBC AUTOMATED: CPT

## 2025-01-22 PROCEDURE — 770022 HCHG ROOM/CARE - ICU (200)

## 2025-01-22 PROCEDURE — A9270 NON-COVERED ITEM OR SERVICE: HCPCS | Performed by: STUDENT IN AN ORGANIZED HEALTH CARE EDUCATION/TRAINING PROGRAM

## 2025-01-22 PROCEDURE — 83735 ASSAY OF MAGNESIUM: CPT

## 2025-01-22 PROCEDURE — 85007 BL SMEAR W/DIFF WBC COUNT: CPT

## 2025-01-22 RX ORDER — POTASSIUM CHLORIDE 1500 MG/1
40 TABLET, EXTENDED RELEASE ORAL ONCE
Status: COMPLETED | OUTPATIENT
Start: 2025-01-22 | End: 2025-01-22

## 2025-01-22 RX ADMIN — ATORVASTATIN CALCIUM 10 MG: 10 TABLET, FILM COATED ORAL at 05:31

## 2025-01-22 RX ADMIN — OMEPRAZOLE 20 MG: 20 CAPSULE, DELAYED RELEASE ORAL at 05:31

## 2025-01-22 RX ADMIN — POTASSIUM CHLORIDE 40 MEQ: 1500 TABLET, EXTENDED RELEASE ORAL at 10:34

## 2025-01-22 RX ADMIN — TAMSULOSIN HYDROCHLORIDE 0.4 MG: 0.4 CAPSULE ORAL at 10:34

## 2025-01-22 SDOH — ECONOMIC STABILITY: TRANSPORTATION INSECURITY
IN THE PAST 12 MONTHS, HAS LACK OF RELIABLE TRANSPORTATION KEPT YOU FROM MEDICAL APPOINTMENTS, MEETINGS, WORK OR FROM GETTING THINGS NEEDED FOR DAILY LIVING?: NO

## 2025-01-22 SDOH — ECONOMIC STABILITY: TRANSPORTATION INSECURITY
IN THE PAST 12 MONTHS, HAS THE LACK OF TRANSPORTATION KEPT YOU FROM MEDICAL APPOINTMENTS OR FROM GETTING MEDICATIONS?: NO

## 2025-01-22 ASSESSMENT — GAIT ASSESSMENTS
GAIT LEVEL OF ASSIST: SUPERVISED
DISTANCE (FEET): 200
DEVIATION: NO DEVIATION

## 2025-01-22 ASSESSMENT — ENCOUNTER SYMPTOMS
ABDOMINAL PAIN: 0
CHILLS: 0
NAUSEA: 0
VOMITING: 0
FEVER: 0
DIZZINESS: 0
SHORTNESS OF BREATH: 0
SPEECH CHANGE: 1
WEIGHT LOSS: 0
PALPITATIONS: 0
HEADACHES: 1
WEAKNESS: 0
HEADACHES: 0
COUGH: 0

## 2025-01-22 ASSESSMENT — COGNITIVE AND FUNCTIONAL STATUS - GENERAL
MOBILITY SCORE: 18
WALKING IN HOSPITAL ROOM: A LITTLE
TURNING FROM BACK TO SIDE WHILE IN FLAT BAD: A LITTLE
MOVING TO AND FROM BED TO CHAIR: A LITTLE
MOVING FROM LYING ON BACK TO SITTING ON SIDE OF FLAT BED: A LITTLE
DAILY ACTIVITIY SCORE: 24
STANDING UP FROM CHAIR USING ARMS: A LITTLE
CLIMB 3 TO 5 STEPS WITH RAILING: A LITTLE
SUGGESTED CMS G CODE MODIFIER DAILY ACTIVITY: CH
SUGGESTED CMS G CODE MODIFIER MOBILITY: CK

## 2025-01-22 ASSESSMENT — SOCIAL DETERMINANTS OF HEALTH (SDOH)
WITHIN THE LAST YEAR, HAVE YOU BEEN HUMILIATED OR EMOTIONALLY ABUSED IN OTHER WAYS BY YOUR PARTNER OR EX-PARTNER?: NO
WITHIN THE PAST 12 MONTHS, YOU WORRIED THAT YOUR FOOD WOULD RUN OUT BEFORE YOU GOT THE MONEY TO BUY MORE: NEVER TRUE
WITHIN THE LAST YEAR, HAVE TO BEEN RAPED OR FORCED TO HAVE ANY KIND OF SEXUAL ACTIVITY BY YOUR PARTNER OR EX-PARTNER?: NO
WITHIN THE LAST YEAR, HAVE YOU BEEN AFRAID OF YOUR PARTNER OR EX-PARTNER?: NO
WITHIN THE PAST 12 MONTHS, THE FOOD YOU BOUGHT JUST DIDN'T LAST AND YOU DIDN'T HAVE MONEY TO GET MORE: NEVER TRUE
IN THE PAST 12 MONTHS, HAS THE ELECTRIC, GAS, OIL, OR WATER COMPANY THREATENED TO SHUT OFF SERVICE IN YOUR HOME?: NO
WITHIN THE LAST YEAR, HAVE YOU BEEN KICKED, HIT, SLAPPED, OR OTHERWISE PHYSICALLY HURT BY YOUR PARTNER OR EX-PARTNER?: NO

## 2025-01-22 ASSESSMENT — ACTIVITIES OF DAILY LIVING (ADL): TOILETING: INDEPENDENT

## 2025-01-22 NOTE — THERAPY
"Speech Language Pathology   Cognitive Evaluation     Patient Name: Elias Vincent  AGE:  89 y.o., SEX:  male  Medical Record #: [id number]  Date of Service: 1/22/2025      History of Present Illness  89 y/o male presented 1/21 with 2.5x2.0 parietal-occipital hemorrhage.     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 Deficits noted in attention, memory, executive functions and language.     CT/CTA Head 1/22:  \"1.  2.5 x 2.0 cm left parieto-occipital parenchymal hemorrhage with mild surrounding vasogenic edema.  2.  No large vessel occlusion or aneurysm.  3.  White matter disease, likely microvascular ischemic change.  4.  Atrophy.\"      General Information  Vitals  Pulse Oximetry: 94 %  O2 Delivery Device: None - Room Air  Level of Consciousness: Alert, Awake  Patient Behaviors: Calm, Cooperative  Orientation: Oriented x 4  Follows Directives: Yes      Prior Living Situation & Level of Function  Housing / Facility: [address] [place] [address]  Lives with - Patient's Self Care Capacity: Spouse  Communication: Documented and patient reported cognitive deficits at baseline. Pt reports that his wife primarily manages his IADLs. He reports that he serves at his [employer]  Swallowing: [id number]/[id number]       Speech Production Comments  Dentition: Fair, Natural dentition  Motor Speech: WFL - 100% intelligible at the conversational level without overt concern for apraxia or dysarthria    Voice Quality: WFL      Subjective  Pt agreeable and cooperative with SLP evaluation tasks. Reports that he had significant cognitive deficits and a hx of cognitive therapy after his previous stroke. He reports residual deficits, particularly in the areas of memory and math (e.g. 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 Overall reported that he feels that his cognition is consistent with his baseline.      Communication Domain(s)  Expressive Language: Mild  Receptive Language: WFL  Cognitive-Linguistic: Severe  Motor Speech: WFL      Assessment  The patient was seen this date for a cognitive evaluation.      Cognistat  Orientation: Average  Attention: Average  Repetition: Average  Naming: Average  Memory: Moderate   - Immediate recall after 4 repetitions from SLP   - After 5 min delay, patient recalled 2/4 words with category and 1/4 words with field of three choice  Calculations: Moderate   - With extra time and provided pen and paper, patient advanced to [facility]  Similarities: Severe  Judgement: Average      Medication Management  Pt demonstrated fair to good  synthesis of information during medication management task. Answered temporal and numerical reasoning tasks with 100% accuracy. Pt does report taking routine medications at home; he reports that his wife assists with medication management at home. Fair to good judgement and divergent thinking when asked to list questions they would want to ask about a newly prescribed medication.        Clock Drawing  Good organization with clock drawing task given extra processing time. Clock drawn scored 13/13 indicating WFL per CLQT protocol.      Other Non-Standard Measures  1-step commands: 100%  2-step commands: 100%  3-step commands: 100%  Picture Description Task: Required min cues; appropriate language content with some word finding deficits noted. No inattention noted. Pt able to identify problems in the picture and propose reasonable solutions.         Clinical Impressions  Per results of formal and informal cognitive evaluations, the patient presents with cognition consistent with or approaching their baseline level of functioning. He also receives direct A with IADLs at home, per report. Therefore, no further acute speech pathology services are  indicated at this time.  Discussed with patient that, should higher level deficits impact the patient's ability to resume premorbid activities, recommend outpatient speech therapy follow up. Patient verbalized understanding and agreement.      NOTE: It is not within the scope of practice of Speech-Language Pathologists to determine patient capacity. Please defer to the physician or psych to complete this assessment.       Recommendations  Supervision Needs Upons Discharge: Direct assistance with IADLs (reportedly baseline)  IADLs: Medication management, Financial management, Appointment management, Household chores, Cooking       SLP Treatment Plan  Treatment Plan: None Indicated  SLP Frequency: N/A - Evaluation Only  Estimated Duration: N/A - Evaluation Only      Anticipated Discharge Needs  Discharge Recommendations: Recommend outpatient speech therapy services  Therapy Recommendations Upon DC: Patient / Family / Caregiver Education, Community Re-Integration, Cognitive-Linguistic Training      Melvi Dorsey, SLP

## 2025-01-22 NOTE — DISCHARGE PLANNING
[facility] Transitional Care Coordination     Referral from: Dr Alcazar  Insurance Provider on Facesheet: Medicare/  Potential Rehab Diagnosis: Stroke     Chart review indicates patient may have on going medical management and may have therapy needs to possibly meet inpatient rehab facility criteria with the goal of returning to community.     D/C support: TBD     Physiatry consultation pended per protocol. Pending work up and therapy evals as appropriate, TCC will follow.     Thank you for the referral.

## 2025-01-22 NOTE — DISCHARGE PLANNING
Inpatient Rehabilitation facility care is only considered by Medicare to be reasonable and necessary under 1862(a)(1)(A) of the Social Security Act if the patient meets all of the requirements outlined in 42 CFR § 412.622(a). CMS requires determinations of whether IRF stays are reasonable and necessary to be based on an assessment of each beneficiary's individual care needs.    The patient is not a candidate for Acute Rehabilitation Hospitalization because they lack the modifiable need for 2/3 therapy disciplines (PT, OT, SLP) .

## 2025-01-22 NOTE — THERAPY
Occupational Therapy   Initial Evaluation     Patient Name: Elias Vincent  Age:  89 y.o., Sex:  male  Medical Record #: [id number]  Today's Date: 1/22/2025     Precautions  Comments: -140    Assessment  Patient is 89 y.o. male with a diagnosis of L parietal IPH.   Pt is at or near his/her functional baseline. Pt with no further skilled OT needs in the acute care setting at this time.       Plan    Occupational Therapy Initial Treatment Plan   Duration: (P) Discharge Needs Only       Discharge Recommendations: (P) Anticipate that the patient will have no further occupational therapy needs after discharge from the hospital        01/22/25 0811   Prior Living Situation   Housing / Facility 1 Story House   Steps Into Home 0   Bathroom Set up Walk In Shower   Equipment Owned None   Lives with - Patient's Self Care Capacity Spouse   Prior Level of ADL Function   Self Feeding Independent   Grooming / Hygiene Independent   Bathing Independent   Dressing Independent   Toileting Independent   Active ROM Upper Body   Active ROM Upper Body  WDL   Strength Upper Body   Upper Body Strength  WDL   Balance Assessment   Sitting Balance (Static) Good   Sitting Balance (Dynamic) Fair +   Standing Balance (Static) Fair +   Standing Balance (Dynamic) Fair +  (no AD)   Weight Shift Sitting Good   Weight Shift Standing Good   ADL Assessment   Grooming Supervision   Upper Body Dressing Supervision   Lower Body Dressing Supervision   Toileting Supervision   Functional Mobility   Sit to Stand Supervised   Bed, Chair, Wheelchair Transfer Supervised   Occupational Therapy Initial Treatment Plan    Duration Discharge Needs Only   Anticipated Discharge Equipment and Recommendations   Discharge Recommendations Anticipate that the patient will have no further occupational therapy needs after discharge from the hospital

## 2025-01-22 NOTE — THERAPY
"Physical Therapy   Initial Evaluation     Patient Name: Elias Vincent  Age:  89 y.o., Sex:  male  Medical Record #: [id number]  Today's Date: 1/22/2025       Precautions:  -140    Assessment    89 y.o. male was admitted for L parietal hemorrhage in the setting of amyloid angiopathy.  PMHx also includes L temporal ICH and melanoma (L shin).  He lives with his wife in a 1SH, no ELEN and was independent for mobility without an AD, able t drive short community distances.  On eval, he demonstrates good strength, intact sensation, and mobilized x200' SPV without an AD, no LOB with 4 way head turns, change of direction, change in gait speed, and increased single leg stance time.  He required repeat of commands to complete testing 25% of the time - ? Cog impairment, ? baseline.  Pt reports no hearing deficits.  He states his wife will be home with him all the time.  He has no acute or post acute PT needs.  DC PT services.    Plan    Physical Therapy Initial Treatment Plan   Duration: (P) Discharge Needs Only    DC Equipment Recommendations: (P) None  Discharge Recommendations: (P) Anticipate that the patient will have no further physical therapy needs after discharge from the hospital     Subjective    \" I didn't even know I was having a stroke.  I didn't feel like anything was wrong.\"     Objective       01/22/25 0914   Initial Contact Note    Initial Contact Note Order Received and Verified, Physical Therapy Evaluation in Progress with Full Report to Follow.   Vitals   O2 Delivery Device None - Room Air   Pain 0 - 10 Group   Therapist Pain Assessment Post Activity Pain Same as Prior to Activity  (no complaints of pain)   Prior Living Situation   Housing / Facility 1 Story House   Steps Into Home 0   Bathroom Set up Walk In Shower   Equipment Owned None   Lives with - Patient's Self Care Capacity Spouse   Prior Level of Functional Mobility   Bed Mobility Independent   Transfer Status Independent   Ambulation " Independent   Ambulation Distance community   Assistive Devices Used None   Comments drives short distances, doesn't work   History of Falls   History of Falls No   Date of Last Fall   (Pt reports no falls x1 year)   Cognition    Level of Consciousness Alert   Comments Pt followed all commands with repeat of commands ~25% of the time.- ?cog.  Pt reports no hearing deficits.   Active ROM Upper Body   Comments WFL for mobility   Strength Upper Body   Comments WFL for mobility   Active ROM Lower Body    Active ROM Lower Body  WDL   Strength Lower Body   Lower Body Strength  WDL   Sensation Lower Body   Lower Extremity Sensation   WDL   Lower Body Muscle Tone   Lower Body Muscle Tone  WDL   Coordination Upper Body   Coordination WDL   Vision   Vision Comments Pt reports no visual deficits, does not wear glasses.   Balance Assessment   Sitting Balance (Static) Good   Sitting Balance (Dynamic) Fair +   Standing Balance (Static) Fair +   Standing Balance (Dynamic) Fair +   Weight Shift Sitting Good   Weight Shift Standing Good   Comments without AD   Bed Mobility    Supine to Sit Supervised   Sit to Supine Supervised   Scooting Supervised   Rolling Supervised   Comments bed flat, no rail   Gait Analysis   Gait Level Of Assist Supervised   Assistive Device None   Distance (Feet) 200   # of Times Distance was Traveled 1   Deviation No deviation  (no LOB with 4 way head turns, change of direction, q1sivpk of gait speed, increased single leg stance time)   Weight Bearing Status FWB BLEs   Functional Mobility   Sit to Stand Supervised   Bed, Chair, Wheelchair Transfer Supervised   Transfer Method Stand Step   Mobility without AD   ICU Target Mobility Level   ICU Mobility - Targeted Level Level 4   Edema / Skin Assessment   Edema / Skin  Not Assessed   Education Group   Education Provided Role of Physical Therapist   Role of Physical Therapist Patient Response Patient;Acceptance;Explanation;Action Demonstration   Physical  Therapy Initial Treatment Plan    Duration Discharge Needs Only   Problem List    Problems None   Anticipated Discharge Equipment and Recommendations   DC Equipment Recommendations None   Discharge Recommendations Anticipate that the patient will have no further physical therapy needs after discharge from the hospital   Interdisciplinary Plan of Care Collaboration   IDT Collaboration with  Nursing   Patient Position at End of Therapy In Bed;Call Light within Reach;Tray Table within Reach  (physician in the room)   Collaboration Comments RN updated   Session Information   Date / Session Number  1/22- dc needs only

## 2025-01-22 NOTE — PROGRESS NOTES
4 Eyes Skin Assessment Completed by [employer] Arredondo and [employer] Figueroa.    Head WDL  Ears Red and Blanching  Nose WDL  Mouth WDL  Neck WDL  Breast/Chest Rash  Shoulder Blades Red Rash/flaky; scattered  Spine Red rash/flaky; scattered  (R) Arm/Elbow/Hand Redness and Blanching  (L) Arm/Elbow/Hand Redness and Blanching  Abdomen WDL  Groin WDL  Scrotum/Coccyx/Buttocks Redness and Blanching  (R) Leg Redness and Rash  (L) Leg Redness, Rash, and Scab, Black Melanoma scab left shin  (R) Heel/Foot/Toe Redness and Blanching, Flaky  (L) Heel/Foot/Toe Redness and Blanching, Flaky          Devices In Places ECG, Blood Pressure Cuff, Pulse Ox, and SCD's      Interventions In Place Pillows, Q2 Turns, and Low Air Loss Mattress    Possible Skin Injury No    Pictures Uploaded Into Epic N/A  Wound Consult Placed N/A  RN Wound Prevention Protocol Ordered No

## 2025-01-22 NOTE — CONSULTS
DATE OF SERVICE:  01/21/2025     INPATIENT CONSULTATION     CHIEF COMPLAINT:  History of amyloid angiopathy, left parietal   intraparenchymal hemorrhage.     HISTORY OF PRESENT ILLNESS:  An 89-year-old right-handed man who had previous   episode of spontaneous bleed in 2018, diagnosed of amyloid angiopathy and   follows with neurology here at [facility].  He reported feeling more subdued and   cut with cognitive changes over the last few days.  He came in, had a 2.5 x 2   cm superficial left parietal intraparenchymal hematoma suggestive of amyloid   angiopathy.  There is mild amount of edema around it, suggesting of a couple   of extra days of age.  He has been neurologically stable.  His labs revealed a   slightly low platelet count secondary to history of monoclonal B-cell   lymphocytosis other.     PAST MEDICAL HISTORY:  Other medical history includes hyperlipidemia, venous   thrombosis melanoma and basal cell adenocarcinoma.     SOCIAL HISTORY:  He is a nonsmoker, nondrinker.     PHYSICAL EXAMINATION:  GENERAL:  He is awake, he is alert, he is slightly confused to some details.    He is oriented x3.  NEUROLOGIC:  Pupils are symmetric.  Extraocular motion intact.  Face full.    Tongue is midline.  He has no pronator drift.  He moves arms and legs   symmetrically with good sensation to face, arms, legs.     ASSESSMENT AND PLAN:  The patient has a history of amyloid angiopathy with   another hemorrhage that does not cause any significant neurologic deficit or   mass effect.  I defer the care to the [facility] neurologist.  He is going to be   admitted for neuro checks and if he declines a new scan, CT scan should be   obtained.     Thanks for allowing me to participate in his care.  I will defer his care to   the ICU staff and neurology.        ______________________________  MD BERNICE Gonsalez III/ZACHARIAH    DD:  01/21/2025 21:55  DT:  01/21/2025 22:12    Job#:  [id number]

## 2025-01-22 NOTE — PROGRESS NOTES
Neurology Progress Note  Neurohospitalist Service, [facility]    Referring Physician: TOMER Zayas*      Interval History: No acute events overnight.  Feels cognitive fog is improving.    Review of systems: In addition to what is detailed in the HPI and/or updated in the interval history, all other systems reviewed and are negative.    Past Medical History, Past Surgical History and Social History reviewed and unchanged from prior    Medications:    Current Facility-Administered Medications:     potassium chloride SA (Kdur) tablet 40 mEq, 40 mEq, Oral, Once, Maximus [place], D.O.    Respiratory Therapy Consult, , Nebulization, Continuous RT, Maximus [place], D.O.    LORazepam (Ativan) injection 2 mg, 2 mg, Intravenous, Q5 MIN PRN, Maximus [place], D.O.    acetaminophen (Tylenol) tablet 650 mg, 650 mg, Oral, Q4HRS PRN **OR** acetaminophen (Tylenol) suppository 650 mg, 650 mg, Rectal, Q4HRS PRN, Maximus Ottoniel, D.O.    ondansetron (Zofran ODT) dispertab 4 mg, 4 mg, Oral, Q4HRS PRN **OR** ondansetron (Zofran) syringe/vial injection 4 mg, 4 mg, Intravenous, Q4HRS PRN, Maximus Ottoniel, D.O.    MD Alert...ICU Electrolyte Replacement per Pharmacy, , Other, PHARMACY TO DOSE, Maximus Ottoniel, D.O.    niCARdipine (Cardene) 25 mg in  mL Standard Infusion, 0-15 mg/hr, Intravenous, Continuous, Gavin Newman M.D., Held at 01/21/25 1845    atorvastatin (Lipitor) tablet 10 mg, 10 mg, Oral, DAILY, Maximus Ottoniel, D.O., 10 mg at 01/22/25 0531    omeprazole (PriLOSEC) capsule 20 mg, 20 mg, Oral, DAILY, Maximus [place]toniel, D.O., 20 mg at 01/22/25 0531    tamsulosin (Flomax) capsule 0.4 mg, 0.4 mg, Oral, AFTER BREAKFAST, Maximus [place]toniel, D.O.    hydrALAZINE (Apresoline) injection 10 mg, 10 mg, Intravenous, Q4HRS PRN, Gavin Newman M.D.    ipratropium-albuterol (DUONEB) nebulizer solution, 3 mL, Nebulization, Q4H PRN (RT), Latia Serna    Physical Examination:   /86   Pulse (!) 59   Temp 36.9 °C (98.5 °F)  "(Temporal)   Resp 20   Ht 1.702 m (5' 7.01\")   Wt 67.7 kg (149 lb 4 oz)   SpO2 94%   BMI 23.37 kg/m²       General: Patient is awake and in no acute distress  Neck: There is normal range of motion  CV: Regular rate   Extremities:  Warm, dry, and intact, without peripheral lower extremity edema    NEUROLOGICAL EXAM:     Mental status: Awake, alert and fully oriented.  At times tangential.  Speech and language: Speech is clear and fluent. Occasional word finding difficulty. The patient is able to name and repeat, and follow commands  Cranial nerve exam: Visual fields are full. There is no nystagmus. Extraocular muscles are intact. Face is symmetric.   Motor exam: There is sustained antigravity with no downward drift in bilateral arms and legs.   Sensory exam: Reacts to tactile in all 4 extremities, no neglect to double stim   Coordination: No ataxia on bilateral finger-to-nose testing  Gait: Deferred due to patient preference    Objective Data:    Labs:  Lab Results   Component Value Date/Time    PROTHROMBTM 16.0 (H) 01/21/2025 03:25 PM    INR 1.28 (H) 01/21/2025 03:25 PM      Lab Results   Component Value Date/Time    WBC 7.9 01/22/2025 04:31 AM    RBC 5.42 01/22/2025 04:31 AM    HEMOGLOBIN 16.7 01/22/2025 04:31 AM    HEMATOCRIT 49.7 01/22/2025 04:31 AM    MCV 91.7 01/22/2025 04:31 AM    MCH 30.8 01/22/2025 04:31 AM    MCHC 33.6 01/22/2025 04:31 AM    MPV 11.6 01/22/2025 04:31 AM    NEUTSPOLYS 49.10 01/22/2025 04:31 AM    LYMPHOCYTES 16.40 (L) 01/22/2025 04:31 AM    MONOCYTES 32.80 (H) 01/22/2025 04:31 AM    EOSINOPHILS 1.70 01/22/2025 04:31 AM    BASOPHILS 0.00 01/22/2025 04:31 AM    ANISOCYTOSIS 1+ 01/22/2025 04:31 AM      Lab Results   Component Value Date/Time    SODIUM 140 01/22/2025 04:31 AM    POTASSIUM 3.9 01/22/2025 04:31 AM    CHLORIDE 106 01/22/2025 04:31 AM    CO2 26 01/22/2025 04:31 AM    GLUCOSE 92 01/22/2025 04:31 AM    BUN 18 01/22/2025 04:31 AM    CREATININE 1.04 01/22/2025 04:31 AM    " BUNCREATRAT 16 11/11/2024 05:15 AM      Lab Results   Component Value Date/Time    CHOLSTRLTOT 85 (L) 01/21/2025 02:30 PM    LDL 38 01/21/2025 02:30 PM    HDL 29 (A) 01/21/2025 02:30 PM    TRIGLYCERIDE 92 01/21/2025 02:30 PM       Lab Results   Component Value Date/Time    ALKPHOSPHAT 48 01/22/2025 04:31 AM    ASTSGOT 13 01/22/2025 04:31 AM    ALTSGPT 19 01/22/2025 04:31 AM    TBILIRUBIN 3.5 (H) 01/22/2025 04:31 AM        Imaging/Testing:    I interpreted and/or reviewed the patient's neuroimaging    CT-CTA NECK WITH & W/O-POST PROCESSING   Final Result      No high-grade stenosis, large vessel occlusion, aneurysm or dissection.      CT-CTA HEAD WITH & W/O-POST PROCESS   Final Result      1.  2.5 x 2.0 cm left parieto-occipital parenchymal hemorrhage with mild surrounding vasogenic edema.   2.  No large vessel occlusion or aneurysm.   3.  White matter disease, likely microvascular ischemic change.   4.  Atrophy.      Dr. Tellez discussed these findings with Dr Moi Shell at 1/21/2025 5:10 PM          Assessment and Plan:  Elias Vincent is a 89 year old man with history of lobar-based L temporal ICH in 2018, presenting with fatigue, cognitive fog and found to have a recurrent, small L parietal ICH on 1/21.  His previous MRI brain with multitude of lobar-based cerebral microbleeds, most compatible with a diagnosis of amyloid angiopathy. CAA portends increase recurrent bleed risk, and unfortunately there is no treatment, or reversal for this disease entity.  Secondary prevention is namely avoidance of all antithrombotics and strict BP control.   Will continue close neuromonitoring and BP control.  No recommendations for additional diagnostics.  Fortunately hemorrhage is small and does not warrant hyperosmolar therapy or surgical ICP control.   He does need to have a GOC discussion to delineate clear care plans in circumstance of a future, disabling intracranial hemorrhage.     Problem list:   Left parietal ICH    Cerebral amyloid angiopathy     Plan:              - continue q2h neurochecks, if stable may liberalize to q4h neurochecks tonight              - STRICT SBP goal 100-140, he is currently at goal without interventions              - avoid all antithrombotics, SCDs only indefinitely              - no need for repeat CT unless there is clinical deterioration              - PT/OT/SLP              - palliative care consult to discuss future GOC in circumstance of catastrophic ICH              - [facility] Neurovascular clinic follow up    The evaluation of the patient, and recommended management, was discussed with Dr. Parada. I have performed a physical exam and reviewed and updated ROS and Plan today (1/22/2025).     Gavin Newman MD  Vascular Neurology

## 2025-01-22 NOTE — CONSULTS
"Neurology Initial Consult H&P  Neurohospitalist Service, [facility]    Referring Physician: Maximus Alcazar D.O.    Chief Complaint   Patient presents with    Other     Pt reports having vision issues since Saturday with increase in severity today. Pt is having a hard time describing the vision issues but reports something is wrong with it. Pt also reports \"I just feel lousy like something is wrong\"       HPI: Elais Vincent is a 89 year old man with history of L temporal ICH in December 2018, diagnosed with amyloid angiopathy.  He is followed by [facility] with Dr. Thomason.  He reports over the past 3 days, he has been feeling \"like crap\" with fatigue and cognitive fog.  He states that there also has been some visual changes with flashers that he's noticed.  He denies any focal weakness, no seizure-like event, no LOC.  He does not take any blood thinners as instructed by his doctors.  He does not consistently measure his blood pressure at home.  A head CT revealed a L parietal, lobar-based hemorrhage.  Stroke Neurology consulted to assist with further management.  CT angiogram of head and neck also completed in the ER, and those were normal studies, without evidence of underlying vascular lesion.  On ROS, he denies infectious prodrome or constitutional symptoms.    Review of systems: In addition to what is detailed in the HPI above, all other systems reviewed and are negative.    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    FHx:  family history includes Cancer in his father and sister; Dementia in his maternal aunt; Heart Disease in his brother and mother; No Known Problems in his daughter, daughter, daughter, son, son, son, and son; Stroke in his " "sister.    SHx:   reports that he has never smoked. He has never used smokeless tobacco. He reports that he does not drink alcohol and does not use drugs.    Allergies:  Allergies   Allergen Reactions    Nsaids Unspecified     Bleeding risk in pt with cerebral amyloid angiopathy       Medications:    Current Facility-Administered Medications:     Respiratory Therapy Consult, , Nebulization, Continuous RT, Maximus [place], D.O.    LORazepam (Ativan) injection 2 mg, 2 mg, Intravenous, Q5 MIN PRN, Maximus [place], D.O.    acetaminophen (Tylenol) tablet 650 mg, 650 mg, Oral, Q4HRS PRN **OR** acetaminophen (Tylenol) suppository 650 mg, 650 mg, Rectal, Q4HRS PRN, Maximus [place], D.O.    ondansetron (Zofran ODT) dispertab 4 mg, 4 mg, Oral, Q4HRS PRN **OR** ondansetron (Zofran) syringe/vial injection 4 mg, 4 mg, Intravenous, Q4HRS PRN, Maximus [place], D.O.    MD Alert...ICU Electrolyte Replacement per Pharmacy, , Other, PHARMACY TO DOSE, Maximus [place], D.O.    niCARdipine (Cardene) 25 mg in  mL Standard Infusion, 0-15 mg/hr, Intravenous, Continuous, Gavin Newman M.D.    [START ON 1/22/2025] atorvastatin (Lipitor) tablet 10 mg, 10 mg, Oral, DAILY, Maximus [place]iel, D.O.    [START ON 1/22/2025] omeprazole (PriLOSEC) capsule 20 mg, 20 mg, Oral, DAILY, Maximus [place]toniel, D.O.    [START ON 1/22/2025] tamsulosin (Flomax) capsule 0.4 mg, 0.4 mg, Oral, AFTER BREAKFAST, Maximus Ottoniel, D.O.    hydrALAZINE (Apresoline) injection 10 mg, 10 mg, Intravenous, Q4HRS PRN, Gavin Newman M.D.    Physical Examination:     General: Patient is awake and in no acute distress  Eye: Examination of optic disks not indicated at this time given acuity of consult  Neck: There is normal range of motion  CV: regular rate   Extremities:  clear, dry, intact, without peripheral edema    NEUROLOGICAL EXAM:     /80   Pulse 77   Temp 37 °C (98.6 °F) (Temporal)   Resp 15   Ht 1.702 m (5' 7.01\")   Wt 67.7 kg (149 lb 4 oz)   SpO2 92%   BMI 23.37 kg/m²       Mental " status: Awake, alert and fully oriented.  At times tangential.  Speech and language: Speech is clear and fluent. Occasional word finding difficulty. The patient is able to name and repeat, and follow commands  Cranial nerve exam: Visual fields are full. There is no nystagmus. Extraocular muscles are intact. Face is symmetric.   Motor exam: There is sustained antigravity with no downward drift in bilateral arms and legs.   Sensory exam: Reacts to tactile in all 4 extremities, no neglect to double stim   Coordination: No ataxia on bilateral finger-to-nose testing  Gait: Deferred due to patient preference    [facility] documentation ICH    GCS: Eye: (spontaneous 4) Motor: (obeys commands 6) Verbal:(oriented 5) Total:  15  2.   Age: 89  3.   Infratentorial ICH:  No  4.   IVH present:  No  5.   ICH volume:  3.1 ml by ABC/2 method  6.   ICH score: 1   Time/Date completed: 1/21/2025 745PM  (GCS 3-4 2pts, 5-12 1pt, 13-15 0pt; Age > 80 1pt; Infra ICH 1pt; Volume > 30cc 1pt; IVH 1pt)  7.   Lobar hemorrhage:  Yes  8.   INR > 1.4: No  9.   If INR > 1.4, was procoagulant reversal agent given (if no, why not):  N/A      Objective Data:    Labs:  Lab Results   Component Value Date/Time    PROTHROMBTM 16.0 (H) 01/21/2025 03:25 PM    INR 1.28 (H) 01/21/2025 03:25 PM      Lab Results   Component Value Date/Time    WBC 8.3 01/21/2025 01:42 PM    RBC 5.96 01/21/2025 01:42 PM    HEMOGLOBIN 18.2 (H) 01/21/2025 01:42 PM    HEMATOCRIT 55.7 (H) 01/21/2025 01:42 PM    MCV 93.5 01/21/2025 01:42 PM    MCH 30.5 01/21/2025 01:42 PM    MCHC 32.7 01/21/2025 01:42 PM    MPV 12.1 01/21/2025 01:42 PM    NEUTSPOLYS 42.10 (L) 01/21/2025 01:42 PM    LYMPHOCYTES 15.80 (L) 01/21/2025 01:42 PM    MONOCYTES 42.10 (H) 01/21/2025 01:42 PM    EOSINOPHILS 0.00 01/21/2025 01:42 PM    BASOPHILS 0.00 01/21/2025 01:42 PM    ANISOCYTOSIS 1+ 01/21/2025 01:42 PM      Lab Results   Component Value Date/Time    SODIUM 142 01/21/2025 06:55 PM    POTASSIUM 5.4 01/21/2025 01:42  PM    CHLORIDE 105 01/21/2025 01:42 PM    CO2 24 01/21/2025 01:42 PM    GLUCOSE 90 01/21/2025 01:42 PM    BUN 21 01/21/2025 01:42 PM    CREATININE 0.96 01/21/2025 01:42 PM    BUNCREATRAT 16 11/11/2024 05:15 AM      Lab Results   Component Value Date/Time    CHOLSTRLTOT 85 (L) 01/21/2025 02:30 PM    LDL 38 01/21/2025 02:30 PM    HDL 29 (A) 01/21/2025 02:30 PM    TRIGLYCERIDE 92 01/21/2025 02:30 PM       Lab Results   Component Value Date/Time    ALKPHOSPHAT 53 01/21/2025 01:42 PM    ASTSGOT 11 (L) 01/21/2025 01:42 PM    ALTSGPT 21 01/21/2025 01:42 PM    TBILIRUBIN 3.4 (H) 01/21/2025 01:42 PM        Imaging/Testing:    I interpreted and/or reviewed the patient's neuroimaging    CT-CTA NECK WITH & W/O-POST PROCESSING   Final Result      No high-grade stenosis, large vessel occlusion, aneurysm or dissection.      CT-CTA HEAD WITH & W/O-POST PROCESS   Final Result      1.  2.5 x 2.0 cm left parieto-occipital parenchymal hemorrhage with mild surrounding vasogenic edema.   2.  No large vessel occlusion or aneurysm.   3.  White matter disease, likely microvascular ischemic change.   4.  Atrophy.      Dr. Tellez discussed these findings with Dr Moi Shell at 1/21/2025 5:10 PM          Assessment and Plan:  Elias Vincent is a 89 year old man with history of lobar-based L temporal ICH in 2018, presenting with fatigue, cognitive fog and found to have a recurrent, small L parietal ICH.  His previous MRI brain with multitude of lobar-based cerebral microbleeds, most compatible with a diagnosis of amyloid angiopathy.  As such, I think this is the underlying etiology.  CAA portends increase recurrent bleed risk, and unfortunately there is no treatment, reversal for this disease entity.  Secondary prevention is namely avoidance of all antithrombotics and strict BP control.   Will admit to ICU for close neuromonitoring and BP control.  No recommendations for additional diagnostics.  Initiate therapies.  Fortunately hemorrhage is  small and does not warrant hyperosmolar therapy or surgical ICP control.   He does need to have a GOC discussion to delineate clear care plans in circumstance of a future, disabling intracranial hemorrhage.    Problem list:   Left parietal ICH   Cerebral amyloid angiopathy    Plan:   - admit to RICU, q2h neurochecks   - STRICT SBP goal 100-140, he is currently at goal without interventions   - avoid all antithrombotics, SCDs only indefinitely   - no need for repeat CT unless there is clinical deterioration   - I do not recommend additional neurodiagnostics such as MRI brain given prior clinical history, recent MRI brain, and known diagnosis of CAA   - PT/OT/SLP   - palliative care consult to discuss future GOC in circumstance of catastrophic ICH   - [facility]      Patient discussed with Dr. Maximus Alcazar, ICU attending.    Gavin Newman MD  Vascular Neurology

## 2025-01-22 NOTE — CONSULTS
"Critical Care Consultation    Date of consult: 1/21/2025    Referring Physician  Dagoberto Shell    Reason for Consultation  ICH    History of Presenting Illness  89 y.o. male with PMH significant for cerebral amyloid, and dyslipidemia who presented 1/21/2025 with ICH.    Patient reports that he had been in his usual state of health when he began to \"feel funny.\" His wife noticed that he was more angry and was slightly more confused. This occurred on 1/20 and he presented to the ED on 1/21. He was found to have 2.5x2.0 parietal-occipital hemorrhage.     Code Status  Full Code    Review of Systems  Review of Systems   All other systems reviewed and are negative.      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    Surgical History   has a past surgical history that includes cholecystectomy; appendectomy; and rib resection (1970s).    Family History  family history includes Cancer in his father and sister; Dementia in his maternal aunt; Heart Disease in his brother and mother; No Known Problems in his daughter, daughter, daughter, son, son, son, and son; Stroke in his sister.    Social History   reports that he has never smoked. He has never used smokeless tobacco. He reports that he does not drink alcohol and does not use drugs.    Medications  Home Medications       Reviewed by Alhaji Ferreira (Pharmacy Tech) on 01/21/25 at 1830  Med List Status: Complete     Medication Last Dose Status   atorvastatin (LIPITOR) 10 MG Tab 1/20/2025 Active   Flaxseed, Linseed, (FLAXSEED OIL PO)  Active   Flaxseed, Linseed, (FLAXSEED OIL) 1000 MG Cap 1/21/2025 Active   latanoprost (XALATAN) 0.005 % Solution 1/20/2025 Active   multivitamin (THERAGRAN) Tab 1/21/2025 Active   Omega-3 Fatty Acids (FISH OIL) " 1000 MG Cap capsule 1/21/2025 Active   omeprazole (PRILOSEC) 20 MG delayed-release capsule 1/21/2025 Active   tamsulosin (FLOMAX) 0.4 MG capsule 1/21/2025 Active   vitamin D3 (CHOLECALCIFEROL) 1000 Unit (25 mcg) Tab 1/21/2025 Active                  Audit from Redirected Encounters    **Home medications have not yet been reviewed for this encounter**       Current Facility-Administered Medications   Medication Dose Route Frequency Provider Last Rate Last Admin    Respiratory Therapy Consult   Nebulization Continuous RT Maximus Bassett, D.O.        LORazepam (Ativan) injection 2 mg  2 mg Intravenous Q5 MIN PRN Maximus Bassett, D.O.        acetaminophen (Tylenol) tablet 650 mg  650 mg Oral Q4HRS PRN Maximus Bassett, D.O.        Or    acetaminophen (Tylenol) suppository 650 mg  650 mg Rectal Q4HRS PRN Maximus Ottoniel, D.O.        ondansetron (Zofran ODT) dispertab 4 mg  4 mg Oral Q4HRS PRN Maximus Ottoniel, D.O.        Or    ondansetron (Zofran) syringe/vial injection 4 mg  4 mg Intravenous Q4HRS PRN Maximus Ottoniel, D.O.        MD Alert...ICU Electrolyte Replacement per Pharmacy   Other PHARMACY TO DOSE Maximus Ottoniel, D.O.        niCARdipine (Cardene) 25 mg in  mL Standard Infusion  0-15 mg/hr Intravenous Continuous Maximus Ottoniel, D.O.        [START ON 1/22/2025] atorvastatin (Lipitor) tablet 10 mg  10 mg Oral DAILY Maximus Ottoniel, D.O.        [START ON 1/22/2025] omeprazole (PriLOSEC) capsule 20 mg  20 mg Oral DAILY Maximus Bassett, D.O.        [START ON 1/22/2025] tamsulosin (Flomax) capsule 0.4 mg  0.4 mg Oral AFTER BREAKFAST Maximus Bassett, D.O.           Allergies  Allergies   Allergen Reactions    Nsaids Unspecified     Bleeding risk in pt with cerebral amyloid angiopathy       Vital Signs last 24 hours  Temp:  [36.1 °C (96.9 °F)-37 °C (98.6 °F)] 37 °C (98.6 °F)  Pulse:  [53-77] 77  Resp:  [15-19] 15  BP: (117-149)/(57-80) 136/80  SpO2:  [91 %-96 %] 92 %    Physical Exam  Physical Exam  Vitals and nursing note reviewed.    Constitutional:       General: He is not in acute distress.     Appearance: Normal appearance. He is normal weight. He is not toxic-appearing.   HENT:      Head: Normocephalic and atraumatic.      Mouth/Throat:      Mouth: Mucous membranes are moist.      Pharynx: Oropharynx is clear.   Eyes:      Pupils: Pupils are equal, round, and reactive to light.   Cardiovascular:      Rate and Rhythm: Normal rate and regular rhythm.   Pulmonary:      Effort: Pulmonary effort is normal.      Breath sounds: Normal breath sounds.   Abdominal:      General: Abdomen is flat.      Palpations: Abdomen is soft.   Musculoskeletal:         General: Normal range of motion.   Skin:     General: Skin is warm and dry.      Capillary Refill: Capillary refill takes less than 2 seconds.   Neurological:      General: No focal deficit present.      Mental Status: He is alert.   Psychiatric:         Mood and Affect: Mood normal.         Thought Content: Thought content normal.         Fluids  No intake or output data in the 24 hours ending 01/21/25 1926    Laboratory  Recent Results (from the past 48 hours)   CBC WITH DIFFERENTIAL    Collection Time: 01/21/25  1:42 PM   Result Value Ref Range    WBC 8.3 4.8 - 10.8 K/uL    RBC 5.96 4.70 - 6.10 M/uL    Hemoglobin 18.2 (H) 14.0 - 18.0 g/dL    Hematocrit 55.7 (H) 42.0 - 52.0 %    MCV 93.5 81.4 - 97.8 fL    MCH 30.5 27.0 - 33.0 pg    MCHC 32.7 32.3 - 36.5 g/dL    RDW 44.8 35.9 - 50.0 fL    Platelet Count 114 (L) 164 - 446 K/uL    MPV 12.1 9.0 - 12.9 fL    Neutrophils-Polys 42.10 (L) 44.00 - 72.00 %    Lymphocytes 15.80 (L) 22.00 - 41.00 %    Monocytes 42.10 (H) 0.00 - 13.40 %    Eosinophils 0.00 0.00 - 6.90 %    Basophils 0.00 0.00 - 1.80 %    Nucleated RBC 0.00 0.00 - 0.20 /100 WBC    Neutrophils (Absolute) 3.49 1.82 - 7.42 K/uL    Lymphs (Absolute) 1.31 1.00 - 4.80 K/uL    Monos (Absolute) 3.49 (H) 0.00 - 0.85 K/uL    Eos (Absolute) 0.00 0.00 - 0.51 K/uL    Baso (Absolute) 0.00 0.00 - 0.12 K/uL     NRBC (Absolute) 0.00 K/uL    Anisocytosis 1+     Microcytosis 1+    Comp Metabolic Panel    Collection Time: 01/21/25  1:42 PM   Result Value Ref Range    Sodium 142 135 - 145 mmol/L    Potassium 5.4 3.6 - 5.5 mmol/L    Chloride 105 96 - 112 mmol/L    Co2 24 20 - 33 mmol/L    Anion Gap 13.0 7.0 - 16.0    Glucose 90 65 - 99 mg/dL    Bun 21 8 - 22 mg/dL    Creatinine 0.96 0.50 - 1.40 mg/dL    Calcium 10.0 8.5 - 10.5 mg/dL    Correct Calcium 9.0 8.5 - 10.5 mg/dL    AST(SGOT) 11 (L) 12 - 45 U/L    ALT(SGPT) 21 2 - 50 U/L    Alkaline Phosphatase 53 30 - 99 U/L    Total Bilirubin 3.4 (H) 0.1 - 1.5 mg/dL    Albumin 5.2 (H) 3.2 - 4.9 g/dL    Total Protein 7.7 6.0 - 8.2 g/dL    Globulin 2.5 1.9 - 3.5 g/dL    A-G Ratio 2.1 g/dL   ESTIMATED GFR    Collection Time: 01/21/25  1:42 PM   Result Value Ref Range    GFR (CKD-EPI) 76 >60 mL/min/1.73 m 2   DIFFERENTIAL MANUAL    Collection Time: 01/21/25  1:42 PM   Result Value Ref Range    Manual Diff Status PERFORMED    PERIPHERAL SMEAR REVIEW    Collection Time: 01/21/25  1:42 PM   Result Value Ref Range    Peripheral Smear Review see below    PATH INTERP HEME    Collection Time: 01/21/25  1:42 PM   Result Value Ref Range    Interpretation See Comment     Reviewed By Hematology see below    PLATELET ESTIMATE    Collection Time: 01/21/25  1:42 PM   Result Value Ref Range    Plt Estimation Decreased    MORPHOLOGY    Collection Time: 01/21/25  1:42 PM   Result Value Ref Range    RBC Morphology Present    Salicylate    Collection Time: 01/21/25  2:30 PM   Result Value Ref Range    Salicylates, Quant. <1.0 (L) 15.0 - 25.0 mg/dL   TROPONIN    Collection Time: 01/21/25  2:30 PM   Result Value Ref Range    Troponin T 23 (H) 6 - 19 ng/L   Hemoglobin A1C prior to intervention or upon arrival to the unit    Collection Time: 01/21/25  2:30 PM   Result Value Ref Range    Glycohemoglobin 5.5 4.0 - 5.6 %    Est Avg Glucose 111 mg/dL   Lipid Profile    Collection Time: 01/21/25  2:30 PM   Result  Value Ref Range    Cholesterol,Tot 85 (L) 100 - 199 mg/dL    Triglycerides 92 0 - 149 mg/dL    HDL 29 (A) >=40 mg/dL    LDL 38 <100 mg/dL   EKG    Collection Time: 25  2:52 PM   Result Value Ref Range    Report       [facility]    Test Date:  2025  Pt Name:    OTTO MIDDLETON               Department: ER  MRN:        [id number]                      Room:       [facility]  Gender:     Male                         Technician: [id number]  :        1935                   Requested By:ROLDAN LOWRY  Order #:    [id number]                    Reading MD:    Measurements  Intervals                                Axis  Rate:       60                           P:          -26  NE:         198                          QRS:        -63  QRSD:       103                          T:          25  QT:         430  QTc:        430    Interpretive Statements  Sinus rhythm  Left anterior fascicular block  Anteroseptal infarct, old  Compared to ECG 2018 13:48:06  Sinus bradycardia no longer present  Possible ischemia no longer present  Myocardial infarct finding still present     Prothrombin Time    Collection Time: 25  3:25 PM   Result Value Ref Range    PT 16.0 (H) 12.0 - 14.6 sec    INR 1.28 (H) 0.87 - 1.13   URINALYSIS    Collection Time: 25  3:38 PM    Specimen: Urine   Result Value Ref Range    Color Yellow     Character Clear     Specific Gravity 1.019 <1.035    Ph 5.0 5.0 - 8.0    Glucose Negative Negative mg/dL    Ketones Negative Negative mg/dL    Protein Negative Negative mg/dL    Bilirubin Negative Negative    Urobilinogen, Urine 0.2 <=1.0 EU/dL    Nitrite Negative Negative    Leukocyte Esterase Negative Negative    Occult Blood Negative Negative    Micro Urine Req see below        Imaging  CT-CTA NECK WITH & W/O-POST PROCESSING   Final Result      No high-grade stenosis, large vessel occlusion, aneurysm or dissection.      CT-CTA HEAD WITH & W/O-POST PROCESS    Final Result      1.  2.5 x 2.0 cm left parieto-occipital parenchymal hemorrhage with mild surrounding vasogenic edema.   2.  No large vessel occlusion or aneurysm.   3.  White matter disease, likely microvascular ischemic change.   4.  Atrophy.      Dr. Tellez discussed these findings with Dr Moi Shell at 1/21/2025 5:10 PM          Assessment/Plan  * CVA (cerebrovascular accident due to intracerebral hemorrhage) (HCC)- (present on admission)  Assessment & Plan   In setting of known cerebral amyloid. ICH score 1. Neurologically intact with some minor lapses in memory.   Plan:  Admit ICU  Neuro checks per protocol  SBP <160  Nicardipine gtt if necessary  No VTE ppx/antiplatelet   Repeat CT head w/out contrast for clinical change  Unless traumatic, no indication for seizure prophylaxis  Platelet >100k, INR <1.5  Echocardiogram  HOB > 30 degrees  PT/OT/SLP  Avoid lying flat when possible          GERD (gastroesophageal reflux disease)- (present on admission)  Assessment & Plan  Continue home omeprazole    Dyslipidemia- (present on admission)  Assessment & Plan  Continue home statin        Discussed patient condition and risk of morbidity and/or mortality with Family, RN, Pharmacy, Patient, and neurology and neurosurgery.    The patient remains critically ill.  Critical care time = 74 minutes in directly providing and coordinating critical care and extensive data review.  No time overlap and excludes procedures.    Maximus Alcazar DO MPH  Critical Care Medicine

## 2025-01-22 NOTE — PROGRESS NOTES
[facility] ICU Progress Note      Admit Date: 1/21/2025    Resident(s): Rodolfo Weiss D.O.   Attending:  PARTH WHITE/ Dr. Hollis     Patient ID:    Name:  Elias Vincent   YOB: 1935  Age:  89 y.o.  male   MRN:  [id number]    Hospital Course (carried forward and updated):  Elias Vincent is a 89 y.o. male with PMH most notable for  cerebral amyloid angiopathy and prior L parietal intraparenchymal hemorrhage (2018) who presented 1/21 with CC not feeling right. He was found to have superficial L parietal intraparenchymal hematoma and was admitted to ICU for close monitoring.     Consultants:  Critical Care  Neurology   Neurosurgery      Interval Events:  This morning pt said that he felt well, he was still having trouble word finding but felt that his current sx were similar to before. Reported improvement of fogginess feeling. Discussed that he had been very much affected by his prior stroke but had still been fairly active since that time.     Vitals Range last 24h:  Temp:  [36.1 °C (96.9 °F)-37 °C (98.6 °F)] 36.9 °C (98.5 °F)  Pulse:  [51-79] 61  Resp:  [13-22] 22  BP: ()/(56-80) 123/63  SpO2:  [91 %-96 %] 92 %      Intake/Output Summary (Last 24 hours) at 1/22/2025 0844  Last data filed at 1/22/2025 0600  Gross per 24 hour   Intake 540 ml   Output 700 ml   Net -160 ml        Review of Systems   Constitutional:  Negative for chills, fever and weight loss.   Respiratory:  Negative for cough and shortness of breath.    Cardiovascular:  Negative for chest pain and palpitations.   Gastrointestinal:  Negative for abdominal pain, nausea and vomiting.   Genitourinary:  Negative for dysuria.   Neurological:  Positive for speech change. Negative for dizziness, weakness and headaches.        Speech at baseline.        PHYSICAL EXAM:  Vitals:    01/22/25 0400 01/22/25 0500 01/22/25 0600 01/22/25 0700   BP: 123/59 127/60 127/59 123/63   Pulse: (!) 57 61 (!) 54 61   Resp: (!) 21 (!) 21 16 (!) 22   Temp:  36.9 °C (98.5 °F)      TempSrc: Temporal      SpO2: 95% 93% 95% 92%   Weight:       Height:        Body mass index is 23.37 kg/m².    O2 therapy: Pulse Oximetry: 92 %, O2 (LPM): 1, O2 Delivery Device: None - Room Air         Physical Exam  Vitals and nursing note reviewed.   Constitutional:       General: He is not in acute distress.     Appearance: He is not ill-appearing or toxic-appearing.   HENT:      Head: Normocephalic and atraumatic.   Cardiovascular:      Rate and Rhythm: Normal rate and regular rhythm.      Heart sounds: Murmur heard.      No friction rub. No gallop.   Pulmonary:      Effort: Pulmonary effort is normal. No respiratory distress.      Breath sounds: No wheezing, rhonchi or rales.   Abdominal:      General: Abdomen is flat. Bowel sounds are normal. There is no distension.      Palpations: Abdomen is soft.      Tenderness: There is no abdominal tenderness. There is no guarding or rebound.   Musculoskeletal:      Right lower leg: No edema.      Left lower leg: No edema.   Neurological:      Mental Status: He is alert and oriented to person, place, and time.      Cranial Nerves: No cranial nerve deficit.      Sensory: No sensory deficit.      Motor: No weakness.             Recent Labs     01/21/25  1342 01/21/25  1855 01/22/25  0431   SODIUM 142 142 140   POTASSIUM 5.4  --  3.9   CHLORIDE 105  --  106   CO2 24  --  26   BUN 21  --  18   CREATININE 0.96  --  1.04   MAGNESIUM  --   --  2.1   CALCIUM 10.0  --  8.9     Recent Labs     01/21/25  1342 01/22/25  0431   ALTSGPT 21 19   ASTSGOT 11* 13   ALKPHOSPHAT 53 48   TBILIRUBIN 3.4* 3.5*   GLUCOSE 90 92     Recent Labs     01/21/25  1342 01/21/25  1525 01/22/25  0431   RBC 5.96  --  5.42   HEMOGLOBIN 18.2*  --  16.7   HEMATOCRIT 55.7*  --  49.7   PLATELETCT 114*  --  105*   PROTHROMBTM  --  16.0*  --    INR  --  1.28*  --      Recent Labs     01/21/25  1342 01/22/25  0431   WBC 8.3 7.9   NEUTSPOLYS 42.10* 49.10   LYMPHOCYTES 15.80* 16.40*    MONOCYTES 42.10* 32.80*   EOSINOPHILS 0.00 1.70   BASOPHILS 0.00 0.00   ASTSGOT 11* 13   ALTSGPT 21 19   ALKPHOSPHAT 53 48   TBILIRUBIN 3.4* 3.5*       Meds:   potassium chloride SA  40 mEq      Respiratory Therapy Consult        LORazepam  2 mg      acetaminophen  650 mg      Or    acetaminophen  650 mg      ondansetron  4 mg      Or    ondansetron  4 mg      MD Alert...Adult ICU Electrolyte Replacement per Pharmacy        niCARdipine (Cardene) Standard Infusion 0.1 mg/mL  0-15 mg/hr Stopped (01/21/25 1845)    atorvastatin  10 mg      omeprazole  20 mg      tamsulosin  0.4 mg      hydrALAZINE  10 mg      ipratropium-albuterol  3 mL          Procedures:  None     Imaging:  CT-CTA NECK WITH & W/O-POST PROCESSING   Final Result      No high-grade stenosis, large vessel occlusion, aneurysm or dissection.      CT-CTA HEAD WITH & W/O-POST PROCESS   Final Result      1.  2.5 x 2.0 cm left parieto-occipital parenchymal hemorrhage with mild surrounding vasogenic edema.   2.  No large vessel occlusion or aneurysm.   3.  White matter disease, likely microvascular ischemic change.   4.  Atrophy.      Dr. Tellez discussed these findings with Dr Moi Shell at 1/21/2025 5:10 PM          ASSESSEMENT and PLAN:    * CVA (cerebrovascular accident due to intracerebral hemorrhage) (HCC)- (present on admission)  Assessment & Plan  In setting of cerebral amyloid. Sx of some lapses in memory.   Neurology onboard, appreciate recs.     -Neuro Checks Q2H     -Strict SBP goal 100-140.     -No anticoag, SCDs only.     -PT/OT/SLP     -outpt neurology clinic f/u     -Palliative consulted.   -Repeat CT head if any neurologic changes.   -Nicardipine drip available to reach BP goals.      GERD (gastroesophageal reflux disease)- (present on admission)  Assessment & Plan  Continue home omeprazole     Dyslipidemia- (present on admission)  Assessment & Plan  Continue home statin    Cerebral amyloid angiopathy (HCC)- (present on admission)  Assessment  & Plan  Cause of pt's thrombocytopenia       DISPO: ICU     CODE STATUS: Full     Quality Measures:  Feeding: regular   Analgesia: Tylenol   Sedation: n/a   Thromboprophylaxis: SCDs   Head of bed: >30 degrees  Ulcer prophylaxis: omeprazole   Glycemic control: n/a   Bowel care: bowel regimen  Indwelling lines: pIV x1   Deescalation of antibiotics: n/a       Rodolfo Weiss D.O. IM PGY-3

## 2025-01-22 NOTE — ED NOTES
Pt transferred out of ED at this time with Maria Elena RINALDI via [facility]. Pt is A&Ox4, with stable vital signs and no apparent distress upon transfer. Pt transferred on tele monitor. All paperwork and personal belongings sent with pt to [facility].

## 2025-01-22 NOTE — ED NOTES
Med rec updated and complete.  Allergies reviewed      Interviewed family ( wife) at bedside.    No anticoagulants or antiplatelet medications.      No outpatient antibiotic use in last 30 days.    Preferred Pharmacy  [facility] = [phone]  [facility] = [phone]

## 2025-01-22 NOTE — PROGRESS NOTES
Met with patient and his wife Evelin at bedside. Discussed at length long term prospects and most specifically discussed code status. After further discussion patient and wife requested to be DNR/DNI. He currently has a fulfilling life and does not want comfort care at this time but would prefer to pass naturally if it is his time.    -Code status changed to DNR/DNI

## 2025-01-22 NOTE — ED NOTES
MRI screening completed, blood collected and sent to lab by primary RN, pt updated on POC. No further needs identified at this time, call light within reach.

## 2025-01-22 NOTE — PROGRESS NOTES
Patient with previous amyloid angiopathy. Non-focal confusion. New P-O hemorrhage, non-threatening. Recommend stroke neurology consult, overnight in ICU to determine stability of exam with neuro checks, consider MRI brain in am w contrast r/o bleeding mas, but it seems the diagnosis is known. Non surgical, care per ICU and neurology, call with questions.

## 2025-01-22 NOTE — DISCHARGE PLANNING
Case Management Discharge Planning    Admission Date: 1/21/2025  GMLOS: 4.5  ALOS: 1    6-Clicks ADL Score: 24  6-Clicks Mobility Score: 18      Anticipated Discharge Dispo: Discharge Disposition: Discharged to home/self care (01)  Discharge Address: [address]  [address]  Discharge Contact Phone Number: [phone]    DME Needed: No    Action(s) Taken:   RN CHAPINCITO spoke with pt and his spouse, Evelni at bedside.  They live in a 1 story [address] in [place].  Pt is independent with ADLs and IADLs.    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  They have no concerns for dc.    Medically Clear: No    Next Steps:   Provide transitional care coordination as needed.    Barriers to Discharge: Medical clearance    Care Transition Team Assessment    Information Source  Orientation Level: Oriented X4  Information Given By: Patient, Spouse  Who is responsible for making decisions for patient? : Patient    Readmission Evaluation  Is this a readmission?: No    Elopement Risk  Legal Hold: No  Ambulatory or Self Mobile in Wheelchair: No-Not an Elopement Risk    Interdisciplinary Discharge Planning  Lives with - Patient's Self Care Capacity: Spouse  Housing / Facility: [address]    Discharge Preparedness  What is your plan after discharge?: Home with help  What are your discharge supports?: Spouse, Child  Prior Functional Level: Ambulatory, Drives Self, Independent with Activities of Daily Living, Independent with Medication Management  Difficulity with ADLs: None  Difficulity with IADLs: None    Functional Assesment  Prior Functional Level: Ambulatory, Drives Self, Independent with Activities of Daily Living, Independent with Medication Management    Finances  Financial Barriers to Discharge: No  Prescription Coverage: Yes    Vision / Hearing Impairment  Right Eye Vision: Other (Comments) (pt reports vision normal now)  Left Eye Vision: Other (Comments) (pt reports vision normal now)         Advance Directive  Advance  Directive?: None    Domestic Abuse  Have you ever been the victim of abuse or violence?: No    Psychological Assessment  History of Substance Abuse: None  History of Psychiatric Problems: No    Discharge Risks or Barriers  Discharge risks or barriers?: No    Anticipated Discharge Information  Discharge Disposition: Discharged to home/self care (01)  Discharge Address: [address] [place] [address] [place]  [address]  Discharge Contact Phone Number: [phone]

## 2025-01-22 NOTE — CONSULTS
"SUMMARY: Patient choosing to remain full CODE STATUS, states that he and his wife have advanced directive on file and their trust.  Will likely need further discussion from medical team outlining increased risk regarding ICH given amyloid angiopathy, updated Dr. Weiss, palliative care to sign off.      MRN: [id number]  Date of palliative consult: January 21, 2025  Reason for consult: Complex goals of care/advance care planning  Referring provider: Daphne  Location of consult: [facility]   Additional consulting services: Critical care, neurology    HPI:   Elias Vincent is a 89 y.o. male with a past medical history significant for cerebral amyloid and dyslipidemia who presented on 1/21/2025 with intracranial hemorrhage.  Patient reports that he was in his usual state of health when he began to \"feel funny\".  His wife noticed that he was more angry and more confused compared to baseline.  She noticed this on 1/20 and he presented to the emergency department on 1/21.  He was found to have a 2.5 x 2.0 cm parietal occipital hemorrhage.    Significant/pertinent past medical history: Basal cell adenocarcinoma, DVT, hyperlipidemia, intracranial hemorrhage in 12/31/2018, melanoma, monoclonal B-cell lymphocytosis of undetermined significance, and peritonitis.  Never smoker no alcohol or drug use.    Pain History: Denies  Onset:   Location:   Duration:   Characteristics:   Aggravating factors:   Alleviating factors:   Radiation:   Treatments:   Severity:     Additional symptoms: Reports appetite intact no other reported symptoms.     Interval History: Patient has been evaluated by both neurology and neurosurgery.  Concern for future catastrophic ICH thus precipitating palliative care consult.  Additional input from neurosurgery indicates no surgical intervention warranted at this time.    Medication Allergy/Sensitivities:  Allergies   Allergen Reactions    Nsaids Unspecified     Bleeding risk in pt with cerebral " amyloid angiopathy       ROS:    Review of Systems   Constitutional:  Negative for malaise/fatigue and weight loss.   Respiratory:  Negative for shortness of breath.    Cardiovascular:  Negative for chest pain.   Gastrointestinal:  Negative for nausea and vomiting.   Neurological:  Positive for speech change and headaches.       PE:   Recent vital signs  BMI: Body mass index is 23.37 kg/m².    Temp (24hrs), Av.7 °C (98.1 °F), Min:36.1 °C (96.9 °F), Max:37 °C (98.6 °F)  Temperature: 36.9 °C (98.5 °F)  Pulse  Av.2  Min: 51  Max: 79   Blood Pressure : 123/63       Physical Exam  Vitals and nursing note reviewed.   Cardiovascular:      Rate and Rhythm: Rhythm irregular.   Pulmonary:      Effort: Pulmonary effort is normal.   Musculoskeletal:      Comments: Some age related muscle wasting.    Skin:     General: Skin is warm and dry.      Capillary Refill: Capillary refill takes 2 to 3 seconds.      Coloration: Skin is pale.   Neurological:      Mental Status: He is alert and oriented to person, place, and time. Mental status is at baseline.   Psychiatric:         Attention and Perception: Attention normal.         Mood and Affect: Mood normal.         Speech: Speech is delayed.         Behavior: Behavior is cooperative.         Thought Content: Thought content normal.         Cognition and Memory: Memory is impaired.         Judgment: Judgment is inappropriate.       Recent Labs     25  1342 25  1855 25  0431   SODIUM 142 142 140   POTASSIUM 5.4  --  3.9   CHLORIDE 105  --  106   CO2 24  --  26   GLUCOSE 90  --  92   BUN 21  --  18   CREATININE 0.96  --  1.04   CALCIUM 10.0  --  8.9     Recent Labs     25  1342 25  0431   WBC 8.3 7.9   RBC 5.96 5.42   HEMOGLOBIN 18.2* 16.7   HEMATOCRIT 55.7* 49.7   MCV 93.5 91.7   MCH 30.5 30.8   MCHC 32.7 33.6   RDW 44.8 44.2   PLATELETCT 114* 105*   MPV 12.1 11.6       ASSESSMENT/PLAN WITH SHARED DECISION MAKING:   Review  Pertinent imaging  reviewed.    PHYSICAL ASPECTS OF CARE  Palliative Performance Scale: 50%    # Intracranial hemorrhage/CVA  # Cerebral amyloid angiopathy  # GERD  # Dyslipidemia  # Frailty/debility      SOCIAL ASPECTS OF CARE  Patient resides with his wife of 62 years, Evlein, they have had 7 children together and have 22 grandchildren and 7 soon-to-be 9 great-grandchildren.  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  Previously independent with ADLs and IADLs.    SPIRITUAL ASPECTS OF CARE   Patient is L[employer] and admits that his spirituality and alberto are very important to him.  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  His wife is arranging for him to have spiritual support here in the hospital.    GOALS OF CARE/SERIOUS ILLNESS CONVERSATION  Introduced myself to patient and wife Evelin. Discussed role of palliative care and reason for consult.  They are agreeable to discuss goals of care.  Assessed current understanding of clinical condition.  Upon discussion of amyloid angiopathy diagnosis wife states that this is a term that is not familiar to her.  Did provide some limited education regarding pathophysiology of disease process and increased risk for intracranial hemorrhage/CVA.  Wife states that she has not ever heard this term and would like some more information from medical team.  Patient states it is important for him to spend time with his family, remain independent, and be able to continue to participate in [employer] activities including attending his [employer] activities.  His goal is to remain in his home with his family and has an upcoming vacation in May which he is looking forward to.  Wife agrees that patient worries about his health and the health of his family members.  Both wife and patient states that they have an advanced directive and have addressed future care  "planning in this document.  Wife states that they would not want to be on machines to sustain their lives.  Patient recalls conversation with admitting intensive care physician regarding CODE STATUS.  Discussed full CODE STATUS versus DNR/DNI or I only.  Patient states that he would prefer for medical team to try and save his life and make decisions later regarding further care.  He associates not being resuscitated with \"destroying a life\".  Further explored ideology behind this statement including discussion regarding cardiac arrest as natural death.  Patient and wife states they will discuss and think some more about this.  Provided additional education regarding potential for additional ICH/CVA.  Discussed worries of medical team should patient have further neurological events of larger magnitude he would become incapacitated and perhaps be relegated to long-term care unable to remain independent or participate in his life as he does now.  Patient verbalizes understanding of this discussion but also seems to have limited insight into gravity of possible catastrophic neurological event.  Provided palliative care contact information and encouraged patient ad wife to reach out with any questions/needs.      Recommend further in depth conversation with wife/patient and other family members of pathology of underlying amyloid angiopathy relative to further neurological events. D/W AYDE Weiss and RNMaria Elena.     Code Status: Full Code    ACP Documents: Have AD in family trust, nothing on file     20 minutes spent discussing advance care planning, this time excludes any other billed services.    Interval diagnostic studies and medical documentation entries pertinent to this case were reviewed independently by me. This patient has at least one acute or chronic illness or injury that poses a threat to life or bodily function. This patient suffers from a high risk of morbidly from additional invasive diagnostic testing " or intensive treatment. Discussion of recommendations and coordination of care undertaken with primary provider/treatment team.        Catia Larson, MSN, APRN, ACNPC-AG.  Palliative Care Nurse Practitioner  [phone] Office  [phone] [facility]

## 2025-01-22 NOTE — ASSESSMENT & PLAN NOTE
In setting of known cerebral amyloid. ICH score 1. Neurologically intact with some minor lapses in memory.   Plan:  Admit ICU  Neuro checks per protocol  SBP <160  Nicardipine gtt if necessary  No VTE ppx/antiplatelet   Repeat CT head w/out contrast for clinical change  Unless traumatic, no indication for seizure prophylaxis  Platelet >100k, INR <1.5  Echocardiogram  HOB > 30 degrees  PT/OT/SLP  Avoid lying flat when possible

## 2025-01-23 VITALS
TEMPERATURE: 98.1 F | RESPIRATION RATE: 20 BRPM | SYSTOLIC BLOOD PRESSURE: 111 MMHG | HEIGHT: 67 IN | OXYGEN SATURATION: 94 % | DIASTOLIC BLOOD PRESSURE: 56 MMHG | WEIGHT: 149.25 LBS | BODY MASS INDEX: 23.43 KG/M2 | HEART RATE: 61 BPM

## 2025-01-23 PROBLEM — R17 SERUM TOTAL BILIRUBIN ELEVATED: Status: ACTIVE | Noted: 2025-01-23

## 2025-01-23 LAB
ALBUMIN SERPL BCP-MCNC: 4.7 G/DL (ref 3.2–4.9)
ALBUMIN/GLOB SERPL: 2 G/DL
ALP SERPL-CCNC: 52 U/L (ref 30–99)
ALT SERPL-CCNC: 20 U/L (ref 2–50)
ANION GAP SERPL CALC-SCNC: 10 MMOL/L (ref 7–16)
ANISOCYTOSIS BLD QL SMEAR: ABNORMAL
AST SERPL-CCNC: 16 U/L (ref 12–45)
BASOPHILS # BLD AUTO: 0.9 % (ref 0–1.8)
BASOPHILS # BLD: 0.07 K/UL (ref 0–0.12)
BILIRUB SERPL-MCNC: 3.1 MG/DL (ref 0.1–1.5)
BUN SERPL-MCNC: 21 MG/DL (ref 8–22)
CALCIUM ALBUM COR SERPL-MCNC: 9.1 MG/DL (ref 8.5–10.5)
CALCIUM SERPL-MCNC: 9.7 MG/DL (ref 8.5–10.5)
CHLORIDE SERPL-SCNC: 105 MMOL/L (ref 96–112)
CO2 SERPL-SCNC: 25 MMOL/L (ref 20–33)
CREAT SERPL-MCNC: 1.23 MG/DL (ref 0.5–1.4)
EOSINOPHIL # BLD AUTO: 0 K/UL (ref 0–0.51)
EOSINOPHIL NFR BLD: 0 % (ref 0–6.9)
ERYTHROCYTE [DISTWIDTH] IN BLOOD BY AUTOMATED COUNT: 43.8 FL (ref 35.9–50)
GFR SERPLBLD CREATININE-BSD FMLA CKD-EPI: 57 ML/MIN/1.73 M 2
GLOBULIN SER CALC-MCNC: 2.4 G/DL (ref 1.9–3.5)
GLUCOSE SERPL-MCNC: 98 MG/DL (ref 65–99)
HCT VFR BLD AUTO: 54.9 % (ref 42–52)
HGB BLD-MCNC: 18.2 G/DL (ref 14–18)
LYMPHOCYTES # BLD AUTO: 1.51 K/UL (ref 1–4.8)
LYMPHOCYTES NFR BLD: 18.2 % (ref 22–41)
MAGNESIUM SERPL-MCNC: 2.1 MG/DL (ref 1.5–2.5)
MANUAL DIFF BLD: NORMAL
MCH RBC QN AUTO: 30.2 PG (ref 27–33)
MCHC RBC AUTO-ENTMCNC: 33.2 G/DL (ref 32.3–36.5)
MCV RBC AUTO: 91 FL (ref 81.4–97.8)
MICROCYTES BLD QL SMEAR: ABNORMAL
MONOCYTES # BLD AUTO: 2.89 K/UL (ref 0–0.85)
MONOCYTES NFR BLD AUTO: 34.8 % (ref 0–13.4)
MORPHOLOGY BLD-IMP: NORMAL
NEUTROPHILS # BLD AUTO: 3.83 K/UL (ref 1.82–7.42)
NEUTROPHILS NFR BLD: 46.1 % (ref 44–72)
NRBC # BLD AUTO: 0 K/UL
NRBC BLD-RTO: 0 /100 WBC (ref 0–0.2)
PLATELET # BLD AUTO: 113 K/UL (ref 164–446)
PLATELET BLD QL SMEAR: NORMAL
PMV BLD AUTO: 11.9 FL (ref 9–12.9)
POTASSIUM SERPL-SCNC: 4.5 MMOL/L (ref 3.6–5.5)
PROT SERPL-MCNC: 7.1 G/DL (ref 6–8.2)
RBC # BLD AUTO: 6.03 M/UL (ref 4.7–6.1)
RBC BLD AUTO: PRESENT
SODIUM SERPL-SCNC: 140 MMOL/L (ref 135–145)
SODIUM SERPL-SCNC: 141 MMOL/L (ref 135–145)
WBC # BLD AUTO: 8.3 K/UL (ref 4.8–10.8)

## 2025-01-23 PROCEDURE — 700102 HCHG RX REV CODE 250 W/ 637 OVERRIDE(OP): Performed by: STUDENT IN AN ORGANIZED HEALTH CARE EDUCATION/TRAINING PROGRAM

## 2025-01-23 PROCEDURE — 99223 1ST HOSP IP/OBS HIGH 75: CPT | Performed by: HOSPITALIST

## 2025-01-23 PROCEDURE — 80053 COMPREHEN METABOLIC PANEL: CPT

## 2025-01-23 PROCEDURE — 83735 ASSAY OF MAGNESIUM: CPT

## 2025-01-23 PROCEDURE — A9270 NON-COVERED ITEM OR SERVICE: HCPCS | Performed by: STUDENT IN AN ORGANIZED HEALTH CARE EDUCATION/TRAINING PROGRAM

## 2025-01-23 PROCEDURE — 85007 BL SMEAR W/DIFF WBC COUNT: CPT

## 2025-01-23 PROCEDURE — 99232 SBSQ HOSP IP/OBS MODERATE 35: CPT | Performed by: PSYCHIATRY & NEUROLOGY

## 2025-01-23 PROCEDURE — 85027 COMPLETE CBC AUTOMATED: CPT

## 2025-01-23 PROCEDURE — 84295 ASSAY OF SERUM SODIUM: CPT

## 2025-01-23 RX ADMIN — OMEPRAZOLE 20 MG: 20 CAPSULE, DELAYED RELEASE ORAL at 05:20

## 2025-01-23 RX ADMIN — ATORVASTATIN CALCIUM 10 MG: 10 TABLET, FILM COATED ORAL at 05:20

## 2025-01-23 RX ADMIN — TAMSULOSIN HYDROCHLORIDE 0.4 MG: 0.4 CAPSULE ORAL at 08:30

## 2025-01-23 ASSESSMENT — ENCOUNTER SYMPTOMS
FEVER: 0
SHORTNESS OF BREATH: 0
CHILLS: 0

## 2025-01-23 NOTE — ASSESSMENT & PLAN NOTE
He has had elevated bilirubin since 2018 with otherwise normal liver enzymes.  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

## 2025-01-23 NOTE — ASSESSMENT & PLAN NOTE
Neurologically appears to be at baseline  No surgical intervention  High risk of a major bleed due to underlying amyloid angiopathy  Palliative care has been consulted Strict BP control 100-140 mmhg   Normal Na  Follow Na q 6  Euglycemic

## 2025-01-23 NOTE — CARE PLAN
The patient is Stable - Low risk of patient condition declining or worsening    Shift Goals  Clinical Goals: Q2 Neuro, Goal -140  Patient Goals: Rest  Family Goals: POC Updates    Progress made toward(s) clinical / shift goals:    Problem: Knowledge Deficit - Standard  Goal: Patient and family/care givers will demonstrate understanding of plan of care, disease process/condition, diagnostic tests and medications  Outcome: Progressing     Problem: Skin Integrity  Goal: Skin integrity is maintained or improved  Outcome: Progressing     Problem: Psychosocial  Goal: Patient's level of anxiety will decrease  Outcome: Progressing  Goal: Patient's ability to verbalize feelings about condition will improve  Outcome: Progressing  Goal: Patient's ability to re-evaluate and adapt role responsibilities will improve  Outcome: Progressing  Goal: Patient and family will demonstrate ability to cope with life altering diagnosis and/or procedure  Outcome: Progressing  Goal: Spiritual and cultural needs incorporated into hospitalization  Outcome: Progressing       Patient is not progressing towards the following goals:

## 2025-01-23 NOTE — DOCUMENTATION QUERY
"                                                                         [facility]                                                                       Query Response Note      PATIENT:               OTTO MIDDLETON  ACCT #:                  [id number]  MRN:                     [id number]  :                      1935  ADMIT DATE:       2025 1:23 PM  DISCH DATE:          RESPONDING  PROVIDER #:        [id number]           QUERY TEXT:    \"Vasogenic edema\"  is noted in the  CT-CTA head report.  Please clarify the clinical/diagnostic relevance for the documented findings.    The patient's clinical indicators include:  Clinical Findings:     Neurosurgery- Dr. Hood:  \"  He came in, had a 2.5 x 2 cm superficial left parietal intraparenchymal hematoma suggestive of amyloid angiopathy.  There is mild amount of edema around it, suggesting of a couple of extra days of age.\"    \"The patient has a history of amyloid angiopathy with another hemorrhage that does not cause any significant neurologic deficit or mass effect.\"    - CT- CTA head: \"IMPRESSION:    1. 2.5 x 2.0 cm left parieto-occipital parenchymal hemorrhage with mild surrounding vasogenic edema.  2. No large vessel occlusion or aneurysm.\"    Risk factors: vasogenic edema, CVA (cerebrovascular accident due to intracerebral hemorrhage; cerebral amyloid angiopathy    Treatment:  CT-CTA head; CT CTA neck; ICU monitoring, neuro checks, Neurology consult; Neurosurgery consult    Please contact me with any questions:    Sharri ALBERTO RN CCDS  [facility]  [email or web address]  Sharri Dumont via Voalte  Options provided:   -- Cerebral vasogenic edema is clinically significant   -- Cerebral vasogenic edema is not clinically significant   -- Other explanation, (Please specify the other explanation)      Query created by: Sharri Dumont on 2025 1:43 PM    RESPONSE TEXT:    Cerebral vasogenic edema is not clinically significant          Electronically " signed by:  PENG CARLSON MD 1/23/2025 6:27 AM

## 2025-01-23 NOTE — ASSESSMENT & PLAN NOTE
Known condition with prior intracranial hemorrhage for which she had not been on any anticoagulation.  Followed by Dr. Thomason neurology

## 2025-01-23 NOTE — DISCHARGE SUMMARY
"Discharge Summary    CHIEF COMPLAINT ON ADMISSION  Chief Complaint   Patient presents with    Other     Pt reports having vision issues since Saturday with increase in severity today. Pt is having a hard time describing the vision issues but reports something is wrong with it. Pt also reports \"I just feel lousy like something is wrong\"       Reason for Admission  Vision Problems     Admission Date  1/21/2025    CODE STATUS  DNAR/DNI    HPI & HOSPITAL COURSE  This is a 89 y.o. male here with vision changes.     Mr. Vincent has a past medical history of cerebral amyloid angiopathy with previous intracranial hemorrhage and mild vascular neurocognitive disorder followed by Dr. Thomason neurology that presented to the emergency room on 1/21/2025 with vision changes for the previous day.  In the emergency room, a CT of the head was done revealing a 2.5 cm x 2 cm parietal occipital hemorrhage.  Neurology was consulted.  Dr. Hood neurosurgery was consulted recommended nonsurgical management.  He was admitted in guarded condition to the ICU with frequent neurochecks, blood pressure control.  In the intensive care unit he elected for a DNI DNR status.     On day of discharge he is feeling much better and back to his baseline.  I met with his wife and daughter at bedside.  They will take him home.  He will follow-up at the neurology clinic and I placed a referral.  We again discussed that he does not take aspirin or NSAIDs and only Tylenol for pain. I discussed with Dr. Newman neurology this morning.    Therefore, he is discharged in good and stable condition to home with close outpatient follow-up.    The patient met 2-midnight criteria for an inpatient stay at the time of discharge.    Discharge Date  1/23    FOLLOW UP ITEMS POST DISCHARGE  Dr. Mancilla and possibly a re-referral to Dr. Keenan oncology about the persistent monocytosis since 2018.  [facility]    No aspirin and no NSAIDs    DISCHARGE DIAGNOSES  Principal " Problem:    CVA (cerebrovascular accident due to intracerebral hemorrhage) (HCC) (POA: Yes)  Active Problems:    Cerebral amyloid angiopathy (HCC) (POA: Yes)    Dyslipidemia (POA: Yes)    GERD (gastroesophageal reflux disease) (POA: Yes)    Monoclonal B-cell lymphocytosis of undetermined significance (POA: Yes)    Serum total bilirubin elevated (POA: Unknown)  Resolved Problems:    * No resolved hospital problems. *      FOLLOW UP  Future Appointments   Date Time Provider Department Center   2/12/2025  2:20 PM [facility]     Tj Mancilla D.O.  [address] [id number]  [facility] [id number]  [phone]    Schedule an appointment as soon as possible for a visit        MEDICATIONS ON DISCHARGE     Medication List        CONTINUE taking these medications        Instructions   atorvastatin 10 MG Tabs  Commonly known as: Lipitor   TAKE 1 TABLET DAILY FOR HIGH AMOUNT OF FATS IN THE BLOOD     fish oil 1000 MG Caps capsule   Take 1,000 mg by mouth every day.  Dose: 1,000 mg     Flaxseed Oil 1000 MG Caps   Take 1,000 mg by mouth every day.  Dose: 1,000 mg     latanoprost 0.005 % Soln  Commonly known as: Xalatan   Administer 1 Drop into both eyes every evening.  Dose: 1 Drop     multivitamin Tabs   Take 1 Tab by mouth every day.  Dose: 1 Tablet     omeprazole 20 MG delayed-release capsule  Commonly known as: PriLOSEC   TAKE 1 CAPSULE DAILY FOR HEARTBURN     tamsulosin 0.4 MG capsule  Commonly known as: Flomax   TAKE 1 CAPSULE 1/2 HOUR AFTER BREAKFAST  Dose: 0.4 mg     vitamin D3 1000 Unit (25 mcg) Tabs  Commonly known as: Cholecalciferol   Take 1,000 Units by mouth every day.  Dose: 1,000 Units              Allergies  Allergies   Allergen Reactions    Nsaids Unspecified     Bleeding risk in pt with cerebral amyloid angiopathy       DIET  Orders Placed This Encounter   Procedures    Diet Order Diet: Regular     Standing Status:   Standing     Number of Occurrences:   1     Order  Specific Question:   Diet:     Answer:   Regular [1]       ACTIVITY  Fall precautions      CONSULTATIONS  Neurology  Neurosurgery  Critical care    PROCEDURES  none    LABORATORY  Lab Results   Component Value Date    SODIUM 140 01/23/2025    POTASSIUM 4.5 01/23/2025    CHLORIDE 105 01/23/2025    CO2 25 01/23/2025    GLUCOSE 98 01/23/2025    BUN 21 01/23/2025    CREATININE 1.23 01/23/2025        Lab Results   Component Value Date    WBC 8.3 01/23/2025    HEMOGLOBIN 18.2 (H) 01/23/2025    HEMATOCRIT 54.9 (H) 01/23/2025    PLATELETCT 113 (L) 01/23/2025      CT-CTA NECK WITH & W/O-POST PROCESSING   Final Result      No high-grade stenosis, large vessel occlusion, aneurysm or dissection.      CT-CTA HEAD WITH & W/O-POST PROCESS   Final Result      1.  2.5 x 2.0 cm left parieto-occipital parenchymal hemorrhage with mild surrounding vasogenic edema.   2.  No large vessel occlusion or aneurysm.   3.  White matter disease, likely microvascular ischemic change.   4.  Atrophy.      Dr. Tellez discussed these findings with Dr Moi Shell at 1/21/2025 5:10 PM            Total time of the discharge process exceeds 34 minutes.

## 2025-01-23 NOTE — ASSESSMENT & PLAN NOTE
Intracranial hemorrhage consistent with bleed secondary to amyloid angiopathy.  Blood pressure control with goal systolic blood pressure under 140  Follow serum sodium levels which have been appropriate  Physical therapy, Occupational Therapy, speech therapy  Refrain from antiplatelets and anticoagulation  Fall precautions

## 2025-01-23 NOTE — CONSULTS
Hospital Medicine Consultation    Date of Service  1/23/2025    Referring Physician  Taty Parada M.D.    Consulting Physician  Jimy Blanca M.D.    Reason for Consultation  Intracranial hemorrhage.    History of Presenting Illness  89 y.o. male who presented 1/21/2025 with vision changes.  Mr. Vincent has a past medical history of cerebral amyloid angiopathy with previous intracranial hemorrhage and mild vascular neurocognitive disorder followed by Dr. Thomason neurology that presented to the emergency room on 1/21/2025 with vision changes for the previous day.  In the emergency room, a CT of the head was done revealing a 2.5 cm x 2 cm parietal occipital hemorrhage.  Neurology was consulted.  Dr. Hood neurosurgery was consulted recommended nonsurgical management.  He was admitted in guarded condition to the ICU with frequent neurochecks, blood pressure control.  In the intensive care unit he elected for a DNI DNR status.    Review of Systems  Review of Systems   Constitutional:  Negative for chills and fever.   Respiratory:  Negative for shortness of breath.    Cardiovascular:  Negative for chest pain.       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    Surgical History   has a past surgical history that includes cholecystectomy; appendectomy; and rib resection (1970s).    Family History  family history includes Cancer in his father and sister; Dementia in his maternal aunt; Heart Disease in his brother and mother; No Known Problems in his daughter, daughter, daughter, son, son, son, and son; Stroke in his sister.    Social History   reports that he has never smoked. He has never used smokeless tobacco. He reports that he does not drink alcohol and does not use drugs.  Lives with  his wife in 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 Taking?   Flaxseed, Linseed, (FLAXSEED OIL) 1000 MG Cap 1/21/2025 Morning  Yes Yes   Sig: Take 1,000 mg by mouth every day.   Omega-3 Fatty Acids (FISH OIL) 1000 MG Cap capsule 1/21/2025 Morning  Yes Yes   Sig: Take 1,000 mg by mouth every day.   atorvastatin (LIPITOR) 10 MG Tab 1/20/2025 Evening  No Yes   Sig: TAKE 1 TABLET DAILY FOR HIGH AMOUNT OF FATS IN THE BLOOD   latanoprost (XALATAN) 0.005 % Solution 1/20/2025 Evening  Yes Yes   Sig: Administer 1 Drop into both eyes every evening.   multivitamin (THERAGRAN) Tab 1/21/2025 Morning Patient Yes Yes   Sig: Take 1 Tab by mouth every day.   omeprazole (PRILOSEC) 20 MG delayed-release capsule 1/21/2025 Morning  No Yes   Sig: TAKE 1 CAPSULE DAILY FOR HEARTBURN   tamsulosin (FLOMAX) 0.4 MG capsule 1/21/2025 Morning  No Yes   Sig: TAKE 1 CAPSULE 1/2 HOUR AFTER BREAKFAST   vitamin D3 (CHOLECALCIFEROL) 1000 Unit (25 mcg) Tab 1/21/2025 Morning  Yes Yes   Sig: Take 1,000 Units by mouth every day.      Facility-Administered Medications: None       Allergies  Allergies   Allergen Reactions    Nsaids Unspecified     Bleeding risk in pt with cerebral amyloid angiopathy       Physical Exam  Temp:  [36.7 °C (98.1 °F)-36.8 °C (98.2 °F)] 36.7 °C (98.1 °F)  Pulse:  [52-72] 61  Resp:  [20-37] 20  BP: (102-141)/(55-86) 141/72  SpO2:  [90 %-94 %] 91 %    Physical Exam  Vitals and nursing note reviewed.   Constitutional:       General: He is not in acute distress.     Appearance: He is not toxic-appearing.   Cardiovascular:      Rate and Rhythm: Normal rate and regular rhythm.   Pulmonary:      Effort: Pulmonary effort is normal.      Breath sounds: Normal breath sounds.   Abdominal:      General: There is no distension.      Tenderness: There is no abdominal tenderness.    Musculoskeletal:      Right lower leg: No edema.      Left lower leg: No edema.   Neurological:      Mental Status: He is alert.      Comments: He moves his arms and legs equally, no facial droop, speech is clear  He is generally oriented though a little forgetful at times         Fluids      Laboratory  Recent Labs     01/21/25  1342 01/22/25  0431 01/23/25  0524   WBC 8.3 7.9 8.3   RBC 5.96 5.42 6.03   HEMOGLOBIN 18.2* 16.7 18.2*   HEMATOCRIT 55.7* 49.7 54.9*   MCV 93.5 91.7 91.0   MCH 30.5 30.8 30.2   MCHC 32.7 33.6 33.2   RDW 44.8 44.2 43.8   PLATELETCT 114* 105* 113*   MPV 12.1 11.6 11.9     Recent Labs     01/21/25  1342 01/21/25  1855 01/22/25  0431 01/22/25  1250 01/22/25  1755 01/23/25  0054 01/23/25  0524   SODIUM 142   < > 140   < > 141 141 140   POTASSIUM 5.4  --  3.9  --   --   --  4.5   CHLORIDE 105  --  106  --   --   --  105   CO2 24  --  26  --   --   --  25   GLUCOSE 90  --  92  --   --   --  98   BUN 21  --  18  --   --   --  21   CREATININE 0.96  --  1.04  --   --   --  1.23   CALCIUM 10.0  --  8.9  --   --   --  9.7    < > = values in this interval not displayed.     Recent Labs     01/21/25  1525   INR 1.28*          Recent Labs     01/21/25  1430   TRIGLYCERIDE 92   HDL 29*   LDL 38        Imaging  CT-CTA NECK WITH & W/O-POST PROCESSING   Final Result      No high-grade stenosis, large vessel occlusion, aneurysm or dissection.      CT-CTA HEAD WITH & W/O-POST PROCESS   Final Result      1.  2.5 x 2.0 cm left parieto-occipital parenchymal hemorrhage with mild surrounding vasogenic edema.   2.  No large vessel occlusion or aneurysm.   3.  White matter disease, likely microvascular ischemic change.   4.  Atrophy.      Dr. Tellez discussed these findings with Dr Moi Shell at 1/21/2025 5:10 PM          Assessment/Plan  * CVA (cerebrovascular accident due to intracerebral hemorrhage) (HCC)- (present on admission)  Assessment & Plan  Intracranial hemorrhage consistent with bleed secondary to  amyloid angiopathy.  Blood pressure control with goal systolic blood pressure under 140  Follow serum sodium levels which have been appropriate  Physical therapy, Occupational Therapy, speech therapy  Refrain from antiplatelets and anticoagulation  Fall precautions      Cerebral amyloid angiopathy (HCC)- (present on admission)  Assessment & Plan  Known condition with prior intracranial hemorrhage for which she had not been on any anticoagulation.  Followed by Dr. Thomason neurology    Serum total bilirubin elevated  Assessment & Plan  He has had elevated bilirubin since 2018 with otherwise normal liver enzymes.  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  His LDL is 38

## 2025-01-23 NOTE — PROGRESS NOTES
"Neurology Progress Note  Neurohospitalist Service, [facility]    Referring Physician: Jimy Blanca      Interval History: No acute events overnight.  Blood pressure remains stable and normal without interventions.  At neurologic baseline.    Review of systems: In addition to what is detailed in the HPI and/or updated in the interval history, all other systems reviewed and are negative.    Past Medical History, Past Surgical History and Social History reviewed and unchanged from prior    Medications:    Current Facility-Administered Medications:     LORazepam (Ativan) injection 2 mg, 2 mg, Intravenous, Q5 MIN PRN, Maximus [place], D.O.    acetaminophen (Tylenol) tablet 650 mg, 650 mg, Oral, Q4HRS PRN **OR** acetaminophen (Tylenol) suppository 650 mg, 650 mg, Rectal, Q4HRS PRN, Maximus [place]toniel, D.O.    ondansetron (Zofran ODT) dispertab 4 mg, 4 mg, Oral, Q4HRS PRN **OR** ondansetron (Zofran) syringe/vial injection 4 mg, 4 mg, Intravenous, Q4HRS PRN, Maximus Ottoniel, D.O.    atorvastatin (Lipitor) tablet 10 mg, 10 mg, Oral, DAILY, Maximus [place], D.O., 10 mg at 01/23/25 0520    omeprazole (PriLOSEC) capsule 20 mg, 20 mg, Oral, DAILY, Maximus [place]toniel, D.O., 20 mg at 01/23/25 0520    tamsulosin (Flomax) capsule 0.4 mg, 0.4 mg, Oral, AFTER BREAKFAST, Maximus [place], D.O., 0.4 mg at 01/23/25 0830    hydrALAZINE (Apresoline) injection 10 mg, 10 mg, Intravenous, Q4HRS PRN, Gavin Newman M.D.    ipratropium-albuterol (DUONEB) nebulizer solution, 3 mL, Nebulization, Q4H PRN (RT), Latia Serna    Physical Examination:   /66   Pulse 73   Temp 36.7 °C (98.1 °F) (Temporal)   Resp 20   Ht 1.702 m (5' 7.01\")   Wt 67.7 kg (149 lb 4 oz)   SpO2 94%   BMI 23.37 kg/m²       General: Patient is awake and in no acute distress  Neck: There is normal range of motion  CV: Regular rate   Extremities:  Warm, dry, and intact, without peripheral lower extremity edema    NEUROLOGICAL EXAM:     Mental status: Awake, alert and " fully oriented.    Speech and language: Speech is clear and fluent. Occasional word finding difficulty. The patient is able to name and repeat, and follow commands  Cranial nerve exam: Visual fields are full. There is no nystagmus. Extraocular muscles are intact. Face is symmetric.   Motor exam: There is sustained antigravity with no downward drift in bilateral arms and legs.   Sensory exam: Reacts to tactile in all 4 extremities, no neglect to double stim   Coordination: No ataxia on bilateral finger-to-nose testing  Gait: Deferred due to patient preference    Objective Data:    Labs:  Lab Results   Component Value Date/Time    PROTHROMBTM 16.0 (H) 01/21/2025 03:25 PM    INR 1.28 (H) 01/21/2025 03:25 PM      Lab Results   Component Value Date/Time    WBC 8.3 01/23/2025 05:24 AM    RBC 6.03 01/23/2025 05:24 AM    HEMOGLOBIN 18.2 (H) 01/23/2025 05:24 AM    HEMATOCRIT 54.9 (H) 01/23/2025 05:24 AM    MCV 91.0 01/23/2025 05:24 AM    MCH 30.2 01/23/2025 05:24 AM    MCHC 33.2 01/23/2025 05:24 AM    MPV 11.9 01/23/2025 05:24 AM    NEUTSPOLYS 46.10 01/23/2025 05:24 AM    LYMPHOCYTES 18.20 (L) 01/23/2025 05:24 AM    MONOCYTES 34.80 (H) 01/23/2025 05:24 AM    EOSINOPHILS 0.00 01/23/2025 05:24 AM    BASOPHILS 0.90 01/23/2025 05:24 AM    ANISOCYTOSIS 1+ 01/23/2025 05:24 AM      Lab Results   Component Value Date/Time    SODIUM 140 01/23/2025 05:24 AM    POTASSIUM 4.5 01/23/2025 05:24 AM    CHLORIDE 105 01/23/2025 05:24 AM    CO2 25 01/23/2025 05:24 AM    GLUCOSE 98 01/23/2025 05:24 AM    BUN 21 01/23/2025 05:24 AM    CREATININE 1.23 01/23/2025 05:24 AM    BUNCREATRAT 16 11/11/2024 05:15 AM      Lab Results   Component Value Date/Time    CHOLSTRLTOT 85 (L) 01/21/2025 02:30 PM    LDL 38 01/21/2025 02:30 PM    HDL 29 (A) 01/21/2025 02:30 PM    TRIGLYCERIDE 92 01/21/2025 02:30 PM       Lab Results   Component Value Date/Time    ALKPHOSPHAT 52 01/23/2025 05:24 AM    ASTSGOT 16 01/23/2025 05:24 AM    ALTSGPT 20 01/23/2025 05:24 AM     TBILIRUBIN 3.1 (H) 01/23/2025 05:24 AM        Imaging/Testing:    I interpreted and/or reviewed the patient's neuroimaging    CT-CTA NECK WITH & W/O-POST PROCESSING   Final Result      No high-grade stenosis, large vessel occlusion, aneurysm or dissection.      CT-CTA HEAD WITH & W/O-POST PROCESS   Final Result      1.  2.5 x 2.0 cm left parieto-occipital parenchymal hemorrhage with mild surrounding vasogenic edema.   2.  No large vessel occlusion or aneurysm.   3.  White matter disease, likely microvascular ischemic change.   4.  Atrophy.      Dr. Tellez discussed these findings with Dr Moi Shell at 1/21/2025 5:10 PM          Assessment and Plan:  Elias Vincent is a 89 year old man with history of lobar-based L temporal ICH in 2018, presenting with fatigue, cognitive fog and found to have a recurrent, small L parietal ICH on 1/21.  His previous MRI brain with multitude of lobar-based cerebral microbleeds, most compatible with a diagnosis of amyloid angiopathy. CAA portends increase recurrent bleed risk, and unfortunately there is no treatment, or reversal for this disease entity.  Secondary prevention is namely avoidance of all antithrombotics and strict BP control.      Problem list:   Left parietal ICH   Cerebral amyloid angiopathy     Plan:              - q4h neurochecks, from Neurology perspective, ok for discharge              - long-term BP goal is 100-130/60-80, at goal without interventiions              - avoid all antithrombotics, SCDs only indefinitely              - at neurologic baseline- he does not have long-term therapy needs              - [facility]    The evaluation of the patient, and recommended management, was discussed with Dr. Blanca. I have performed a physical exam and reviewed and updated ROS and Plan today (1/23/2025).     Gavin Newman MD  Vascular Neurology

## 2025-01-23 NOTE — DISCHARGE INSTRUCTIONS
Discharge Instructions    Discharged to home by car with relative. Discharged via wheelchair, hospital escort: Yes.  Special equipment needed: Not Applicable    Be sure to schedule a follow-up appointment with your primary care doctor or any specialists as instructed.     Discharge Plan:   Diet Plan: Discussed  Activity Level: Discussed  Confirmed Follow up Appointment: Patient to Call and Schedule Appointment  Confirmed Symptoms Management: Discussed  Medication Reconciliation Updated: Yes    I understand that a diet low in cholesterol, fat, and sodium is recommended for good health. Unless I have been given specific instructions below for another diet, I accept this instruction as my diet prescription.   Other diet: regular    Special Instructions:     Stroke/CVA/TIA/Hemorrhagic Ischemia Discharge Instructions  You have had a stroke. Your risk factors have been identified as follows:  Age - Over 55  Gender - Men are at a higher risk than women  It is important that you reduce your risk factors to avoid another stroke in the future. Here are some general guidelines to follow:  Eat healthy - avoid food high in fat.  Get regular exercise.  Maintain a healthy weight.  Avoid smoking.  Avoid alcohol and illegal drug use.  Take your medications as directed.  For more information regarding risk factors, refer to pages 17-19 in your Stroke Patient Education Guide. Stroke Education Guide was given to patient.    Warning signs of a stroke include (which can also be found on page 3 of your Stroke Patient Education Guide):  Sudden numbness of weakness of the face, arm or leg (especially on one side of the body).  Sudden confusion, trouble speaking or understanding.  Sudden trouble seeing in one or both eyes.  Sudden trouble walking, dizziness, loss of balance or coordination.  Sudden severe headache with no known cause.  It is very important to get treatment quickly when a stroke occurs. If you experience any of the above  warning signs, call 165 immediately.     Some patients who have had a stroke will be going home on a blood thinner medication called Warfarin (Coumadin).  This medication requires very close monitoring and follow up.  This follow up can be provided by either your Primary Care Physician or by [facility]'s Outpatient Anticoagulation Service.  The Outpatient Anticoagulation Service is located at the [facility] at [facility] ([facility]).  If you do not know when your follow up appointment is scheduled, call [phone] to verify your appointment time.    -Is this patient being discharged with medication to prevent blood clots?  No    Is patient discharged on Warfarin / Coumadin?   No

## 2025-01-24 ENCOUNTER — PATIENT OUTREACH (OUTPATIENT)
Dept: MEDICAL GROUP | Facility: LAB | Age: 89
End: 2025-01-24
Payer: MEDICARE

## 2025-01-24 NOTE — PROGRESS NOTES
Transitional Care Management  TCM Outreach Date and Time: Filed (1/24/2025  9:37 AM)    Discharge Questions  Actual Discharge Date: 01/23/25  Now that you are home, how are you feeling?: Good  Did you receive any new prescriptions?: No  Do you have any questions about your current medications or new medications (Review Med Rec)?: No  Did you have any durable medical equipment ordered?: No  Do you have a follow up appointment scheduled with your PCP?: No  Was an appointment scheduled for the patient?: No (HAS PREVIOUSLY SCHEDULED APPT BACK TO BACK WITH WIFE AND WOULD LIKE TO KEEP IT . ENCOURAGED TO SCHEDULE WITH ANY PROVIDER IN THE CLINIC IN THE MEANWHILE AS NEEDED.)  Any issues or paperwork you wish to discuss with your PCP?: No  Are you (patient) able to get to the appointment?: Yes  Reason not scheduled?: Declines  If Home Health was ordered, have they contacted you (Patient): Not Applicable  Did you have enough support after your last discharge?: Yes  Does this patient qualify for the CCM program?: No (does not have SCP)    Transitional Care  Number of attempts made to contact patient: 1  Current or previous attempts completed within two business days of discharge? : Yes  Provided education regarding treatment plan, medications, self-management, ADLs?: Yes (ER PRECAUTIONS FOR RECURRENT STROKE SYMPTOMS; NO NSAIDS/ASA, TYLENOL ONLY)  Has patient completed an Advanced Directive?: No  Is there anything else I can help you with?: No    Discharge Summary  Chief Complaint: vision issues  Admitting Diagnosis: CVA (cerebrovascular accident due to intracerebral hemorrhage) ([facility]) (I61.9)  Discharge Diagnosis: CVA (cerebrovascular accident due to intracerebral hemorrhage)

## 2025-01-29 ENCOUNTER — TELEPHONE (OUTPATIENT)
Dept: NEUROLOGY | Facility: MEDICAL CENTER | Age: 89
End: 2025-01-29
Payer: MEDICARE

## 2025-02-04 ENCOUNTER — OFFICE VISIT (OUTPATIENT)
Dept: NEUROLOGY | Facility: MEDICAL CENTER | Age: 89
End: 2025-02-04
Attending: PSYCHIATRY & NEUROLOGY
Payer: MEDICARE

## 2025-02-04 VITALS
SYSTOLIC BLOOD PRESSURE: 114 MMHG | OXYGEN SATURATION: 94 % | DIASTOLIC BLOOD PRESSURE: 62 MMHG | HEIGHT: 67 IN | BODY MASS INDEX: 24.74 KG/M2 | WEIGHT: 157.63 LBS | TEMPERATURE: 97.3 F | HEART RATE: 57 BPM | RESPIRATION RATE: 14 BRPM

## 2025-02-04 DIAGNOSIS — I68.0 CEREBRAL AMYLOID ANGIOPATHY (HCC): ICD-10-CM

## 2025-02-04 DIAGNOSIS — E85.4 CEREBRAL AMYLOID ANGIOPATHY (HCC): ICD-10-CM

## 2025-02-04 DIAGNOSIS — I61.9 CVA (CEREBROVASCULAR ACCIDENT DUE TO INTRACEREBRAL HEMORRHAGE) (HCC): ICD-10-CM

## 2025-02-04 PROCEDURE — 99212 OFFICE O/P EST SF 10 MIN: CPT | Performed by: PSYCHIATRY & NEUROLOGY

## 2025-02-04 PROCEDURE — 3078F DIAST BP <80 MM HG: CPT | Performed by: PSYCHIATRY & NEUROLOGY

## 2025-02-04 PROCEDURE — 99417 PROLNG OP E/M EACH 15 MIN: CPT | Performed by: PSYCHIATRY & NEUROLOGY

## 2025-02-04 PROCEDURE — 3074F SYST BP LT 130 MM HG: CPT | Performed by: PSYCHIATRY & NEUROLOGY

## 2025-02-04 PROCEDURE — 99215 OFFICE O/P EST HI 40 MIN: CPT | Performed by: PSYCHIATRY & NEUROLOGY

## 2025-02-04 ASSESSMENT — ENCOUNTER SYMPTOMS
INSOMNIA: 1
DEPRESSION: 0
BRUISES/BLEEDS EASILY: 0
MEMORY LOSS: 1
HALLUCINATIONS: 0
NERVOUS/ANXIOUS: 1
FALLS: 0
HEMOPTYSIS: 0

## 2025-02-04 ASSESSMENT — FIBROSIS 4 INDEX: FIB4 SCORE: 2.75

## 2025-02-04 NOTE — ASSESSMENT & PLAN NOTE
Things are holding steady in general, he knows to avoid NSAIDs, he has had no signs or symptoms suggestive of progressive cognitive decline or bleeding diathesis.  Unfortunately, this type of spontaneous lobar hemorrhage cannot be prevented.    Blood pressure checks done by his wife did reveal some degree of lability throughout the day, ranging anywhere from 110-145/60-85.  Ideally, systolic blood pressure should be maintained around 110, but that would be a tough feat given the risk of hypotension, dizziness and falls.  Again, I would need to defer this to Dr. Mancilla.  The ranges themselves are actually in all likelihood safe.    The anxiety he is experiencing also can be part of his amyloid condition, especially as he suffers from cognitive deficits, though they are minimal.  He might benefit from an SSRI or an SNRI, avoiding benzodiazepines which can be associated with gait ataxia, dizziness and falls.    Orders:    CT-HEAD W/O; Future

## 2025-02-04 NOTE — PROGRESS NOTES
Felisa Vincent is a 89 y.o. male who presents with his wife Evelin, for urgent follow-up, seen in the hospital on January 21, 2025, with an acute new onset left occipital parietal cortical lobar hemorrhage.     AIDEE Strange symptoms began when he awoke 3 days prior to admission.  He remembers having some distortion of vision but he could not be more specific.  He simply felt that things felt wrong and not normal for him.  He was not bumping into objects, he denied focal motor or sensory distortions.  The language difficulties he had from his original left temporal hemorrhage did not worsen.  He not been on any medications, been eating well, had not fallen recently.  The symptoms persisted.    In the emergency room, CT scan of the brain as well as CTA of the brain and neck all revealed this 2.5 cm left occipital parietal, subcortical hemorrhage with surrounding vasogenic edema.  There is no evidence of other acute ischemic or hemorrhagic insult.  CTA of the head revealed no large vessel occlusion or vascular anomaly, CTA of the neck revealed no stenosis.  ESR was normal as were CBC and CMP values.  He was discharged for follow-up.    Since then he has had some visual distortions that he cannot be more specific about.  He is not bumping into objects.  He had had no other major issues of bleeding diathesis, frequent bruising, nosebleeds, hemoptysis, hematemesis, etc.  He denies headache, nausea or vomiting, focal motor or sensory distortions, syncope, seizure-like activity, etc. there have been no sudden changes in personality or cognitive behaviors.  The chronic language difficulties he had suffered from since his first left temporal lobe lobar hemorrhage were unchanged.  This includes difficulty with reading and processing both written as well as verbal language and communication.    Leading up to the event itself, he was in his usual state of health.  He had not fallen, was not on new medications,  "is not taking any type of NSAID or anticoagulant.  Since the event there has been a notable bump in his anxiety level.  This has made it very difficult for him to sleep as well.  He denies feelings of severe depression, but he does know how easily anxious he can become since the event occurred.  For him things can feel out of control very easily especially since this event occurred spontaneously.    Evelin has also been making a point of checking blood pressures throughout the day since they left the hospital.  There is something of a lability to his numbers, no consistent diurnal pattern, the range between 110-145/60-85.    Medical, surgical and family histories are reviewed, there are no new drug allergies.  He is on Flomax, Lipitor, Xalatan eyedrop, Prilosec, flaxseed oil, multivitamin, fish oil and vitamin D with calcium.    Review of Systems   Constitutional:  Negative for malaise/fatigue.   HENT:  Negative for nosebleeds.    Respiratory:  Negative for hemoptysis.    Gastrointestinal:  Negative for melena.   Genitourinary:  Negative for hematuria.   Musculoskeletal:  Negative for falls.   Endo/Heme/Allergies:  Does not bruise/bleed easily.   Psychiatric/Behavioral:  Positive for memory loss. Negative for depression and hallucinations. The patient is nervous/anxious and has insomnia.    All other systems reviewed and are negative.    Objective     /62 (BP Location: Left arm, Patient Position: Sitting, BP Cuff Size: Adult)   Pulse (!) 57   Temp 36.3 °C (97.3 °F) (Temporal)   Resp 14   Ht 1.702 m (5' 7\")   Wt 71.5 kg (157 lb 10.1 oz)   SpO2 94%   BMI 24.69 kg/m²      Physical Exam    He appears in no acute distress.  Vital signs are stable.  There is no malar rash or jaw claudication.  The neck is supple, range of motion is full.  Cardiac evaluation is unremarkable.  There was a large skin lesion over the left medial shin following a Mohs procedure to remove a basal cell carcinoma.     Neurological " Exam    He is oriented, though his mental processing speed is slowed.  He has some difficulty naming where we are located though he knows he is in a hospital.  He names and repeats, he is not perseverative, he follows multistep commands but take some time doing so.  Occasional mistakes are made but he self corrects.  There is no apraxia or inattention.    PERRLA/EOMI, visual fields are full to movement detection and finger counting on confrontation bilaterally.  There is still a flattening of the right nasolabial fold, sensory exam is intact to temperature and light touch bilaterally.  The tongue and uvula are midline without bulbar dysfunction.  Jaw movements are intact.  Shoulder shrug and head rotation are symmetric.    Musculoskeletal exam reveals normal tone bilaterally, there is no tremor, asterixis, or drift.  Strength is intact in all 4 extremities.  Reflexes are brisk throughout, there are no asymmetries from side-to-side, the ankle jerks are absent bilaterally.  The toes are downgoing bilaterally.    He can stand and walk independently, he is slightly unsteady, station is widened.  Heel and toe walking are done easily.  There is no appendicular dystaxia.  Repetitive movements are symmetric.    Sensory exam is intact to vibration and temperature.  Romberg is absent.    Assessment & Plan   Assessment & Plan  Cerebral amyloid angiopathy (HCC)  Things are holding steady in general, he knows to avoid NSAIDs, he has had no signs or symptoms suggestive of progressive cognitive decline or bleeding diathesis.  Unfortunately, this type of spontaneous lobar hemorrhage cannot be prevented.    Blood pressure checks done by his wife did reveal some degree of lability throughout the day, ranging anywhere from 110-145/60-85.  Ideally, systolic blood pressure should be maintained around 110, but that would be a tough feat given the risk of hypotension, dizziness and falls.  Again, I would need to defer this to   Laurent.  The ranges themselves are actually in all likelihood safe.    The anxiety he is experiencing also can be part of his amyloid condition, especially as he suffers from cognitive deficits, though they are minimal.  He might benefit from an SSRI or an SNRI, avoiding benzodiazepines which can be associated with gait ataxia, dizziness and falls.    Orders:    CT-HEAD W/O; Future    CVA (cerebrovascular accident due to intracerebral hemorrhage) (HCC)  Unprovoked, workup for other causes of spontaneous hemorrhage were ruled out.  I am a little concerned about blood pressure that seems to be fluctuating while he is at home.  A gentle use of antihypertensives to lower the pressure is slightly might be in order, but this must be done cautiously to avoid more severe hypotension with dizziness, syncope, falls, head trauma, etc.  I will defer these choices to Dr. Mancilla.  A follow-up CT of the brain will be ordered.  We will contact them with results if there is anything unusual.    Orders:    CT-HEAD W/O; Future    Follow-up in 6 months.    Time: 60 minutes in total spent in patient care including.  From charting, record review, discussion with healthcare staff and documentation.  This includes face-to-face time for exam, review, discussion, as well as counseling and coordinating care.

## 2025-02-04 NOTE — ASSESSMENT & PLAN NOTE
Unprovoked, workup for other causes of spontaneous hemorrhage were ruled out.  I am a little concerned about blood pressure that seems to be fluctuating while he is at home.  A gentle use of antihypertensives to lower the pressure is slightly might be in order, but this must be done cautiously to avoid more severe hypotension with dizziness, syncope, falls, head trauma, etc.  I will defer these choices to Dr. Mancilla.  A follow-up CT of the brain will be ordered.  We will contact them with results if there is anything unusual.    Orders:    CT-HEAD W/O; Future

## 2025-02-06 ENCOUNTER — APPOINTMENT (OUTPATIENT)
Dept: NEUROLOGY | Facility: MEDICAL CENTER | Age: 89
End: 2025-02-06
Attending: PSYCHIATRY & NEUROLOGY
Payer: MEDICARE

## 2025-02-12 ENCOUNTER — OFFICE VISIT (OUTPATIENT)
Dept: MEDICAL GROUP | Facility: LAB | Age: 89
End: 2025-02-12
Payer: MEDICARE

## 2025-02-12 ENCOUNTER — TELEPHONE (OUTPATIENT)
Dept: MEDICAL GROUP | Facility: LAB | Age: 89
End: 2025-02-12

## 2025-02-12 VITALS
BODY MASS INDEX: 24.33 KG/M2 | WEIGHT: 154.98 LBS | OXYGEN SATURATION: 93 % | HEART RATE: 60 BPM | DIASTOLIC BLOOD PRESSURE: 66 MMHG | TEMPERATURE: 97.2 F | HEIGHT: 67 IN | SYSTOLIC BLOOD PRESSURE: 134 MMHG

## 2025-02-12 DIAGNOSIS — I15.9 SECONDARY HYPERTENSION: ICD-10-CM

## 2025-02-12 DIAGNOSIS — G47.09 OTHER INSOMNIA: ICD-10-CM

## 2025-02-12 DIAGNOSIS — E85.4 CEREBRAL AMYLOID ANGIOPATHY (HCC): ICD-10-CM

## 2025-02-12 DIAGNOSIS — H53.9 VISION CHANGES: ICD-10-CM

## 2025-02-12 DIAGNOSIS — I63.9 OCCIPITAL STROKE (HCC): ICD-10-CM

## 2025-02-12 DIAGNOSIS — I68.0 CEREBRAL AMYLOID ANGIOPATHY (HCC): ICD-10-CM

## 2025-02-12 PROCEDURE — 3078F DIAST BP <80 MM HG: CPT | Performed by: INTERNAL MEDICINE

## 2025-02-12 PROCEDURE — 99214 OFFICE O/P EST MOD 30 MIN: CPT | Performed by: INTERNAL MEDICINE

## 2025-02-12 PROCEDURE — 3075F SYST BP GE 130 - 139MM HG: CPT | Performed by: INTERNAL MEDICINE

## 2025-02-12 RX ORDER — TRAZODONE HYDROCHLORIDE 50 MG/1
50 TABLET ORAL NIGHTLY
Qty: 30 TABLET | Refills: 3 | Status: SHIPPED | OUTPATIENT
Start: 2025-02-12 | End: 2025-02-12 | Stop reason: SDUPTHER

## 2025-02-12 RX ORDER — AMLODIPINE BESYLATE 2.5 MG/1
2.5 TABLET ORAL DAILY
Qty: 100 TABLET | Refills: 3 | Status: SHIPPED | OUTPATIENT
Start: 2025-02-12 | End: 2026-03-19

## 2025-02-12 RX ORDER — TRAZODONE HYDROCHLORIDE 50 MG/1
50 TABLET ORAL NIGHTLY
Qty: 90 TABLET | Refills: 3 | Status: SHIPPED | OUTPATIENT
Start: 2025-02-12

## 2025-02-12 ASSESSMENT — FIBROSIS 4 INDEX: FIB4 SCORE: 2.75

## 2025-02-12 NOTE — TELEPHONE ENCOUNTER
Wife came back in and said she forgot that Dr. Mancilla said he was going to put in a referral to eye doctor. She said that it was discussed with Alexandr at office visit, but forgot to get referral.     Alexandr:  Please call Evelin I have written a referral to Dr. Pieter Potter local ophthalmologist to look at her 's eye's  Regards, Tj Mancilla, DO   I let Evelin know above.

## 2025-02-12 NOTE — PROGRESS NOTES
CC: Elias Vincent is a 89 y.o. male is suffering from   Chief Complaint   Patient presents with    [facility] Follow-up     Stroke, saw neurology on 2/4, CAT scan on 2/18          SUBJECTIVE:  1. Secondary hypertension  Alexandr is here for follow-up continues to have problems with variable hypertension, notes reviewed from Dr. Fransisco Rasheed we have discussed that we need to get his blood pressure under better control    2. Other insomnia  Patient with insomnia will try to avoid benzodiazepine medications secondary to mild neurocognitive disorder    3. Cerebral amyloid angiopathy (HCC)  History of cerebral amyloid angiopathy, referral also written to ophthalmology    4. Occipital stroke (HCC)  Referral written ophthalmology        Past social, family, history:   Social History     Tobacco Use    Smoking status: Never    Smokeless tobacco: Never   Vaping Use    Vaping status: Never Used   Substance Use Topics    Alcohol use: No    Drug use: No         MEDICATIONS:    Current Outpatient Medications:     amLODIPine (NORVASC) 2.5 MG Tab, Take 1 Tablet by mouth every day., Disp: 100 Tablet, Rfl: 3    traZODone (DESYREL) 50 MG Tab, Take 1 Tablet by mouth every evening., Disp: 90 Tablet, Rfl: 3    Flaxseed, Linseed, (FLAXSEED OIL) 1000 MG Cap, Take 1,000 mg by mouth every day., Disp: , Rfl:     latanoprost (XALATAN) 0.005 % Solution, Administer 1 Drop into both eyes every evening., Disp: , Rfl:     Omega-3 Fatty Acids (FISH OIL) 1000 MG Cap capsule, Take 1,000 mg by mouth every day., Disp: , Rfl:     vitamin D3 (CHOLECALCIFEROL) 1000 Unit (25 mcg) Tab, Take 1,000 Units by mouth every day., Disp: , Rfl:     tamsulosin (FLOMAX) 0.4 MG capsule, TAKE 1 CAPSULE 1/2 HOUR AFTER BREAKFAST, Disp: 90 Capsule, Rfl: 3    atorvastatin (LIPITOR) 10 MG Tab, TAKE 1 TABLET DAILY FOR HIGH AMOUNT OF FATS IN THE BLOOD, Disp: 90 Tablet, Rfl: 3    omeprazole (PRILOSEC) 20 MG delayed-release capsule, TAKE 1  "CAPSULE DAILY FOR HEARTBURN, Disp: 90 Capsule, Rfl: 3    multivitamin (THERAGRAN) Tab, Take 1 Tab by mouth every day., Disp: , Rfl:     PROBLEMS:  Patient Active Problem List    Diagnosis Date Noted    Serum total bilirubin elevated 01/23/2025    CVA (cerebrovascular accident due to intracerebral hemorrhage) (HCC) 01/21/2025    Cerebral amyloid angiopathy (HCC) 10/31/2023    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  Heart: No chest pain.  Lungs:  No shortness of Breath.  Psychological: Juan unusual Anxiety depression     PHYSICAL EXAM      Constitutional: Alert, cooperative, not in acute distress.  Cardiovascular:  Rate Rhythm is regular without murmurs rubs clicks.     Thorax & Lungs: Clear to auscultation, no wheezing, rhonchi, or rales  HENT: Normocephalic, Atraumatic.  Eyes: PERRLA, EOMI, Conjunctiva normal.   Neck: Trachia is midline no swelling of the thyroid.   Lymphatic: No lymphadenopathy noted.   Neurologic: Alert & oriented x 3, cranial nerves II through XII are intact, Normal motor function, Normal sensory function, No focal deficits noted.   Psychiatric: Affect normal, Judgment normal, Mood normal.     VITAL SIGNS:/66 (BP Location: Left arm, Patient Position: Sitting, BP Cuff Size: Adult)   Pulse 60   Temp 36.2 °C (97.2 °F) (Temporal)   Ht 1.702 m (5' 7\")   Wt 70.3 kg (154 lb 15.7 oz)   SpO2 93%   BMI 24.27 kg/m²     Labs: Reviewed    Assessment:                                                     Plan:     1. Secondary hypertension  Add amlodipine to current medical " regimen  - amLODIPine (NORVASC) 2.5 MG Tab; Take 1 Tablet by mouth every day.  Dispense: 100 Tablet; Refill: 3    2. Other insomnia  Start trazodone  - traZODone (DESYREL) 50 MG Tab; Take 1 Tablet by mouth every evening.  Dispense: 90 Tablet; Refill: 3    3. Cerebral amyloid angiopathy (HCC)  Referral written to ophthalmology    4. Occipital stroke (HCC)  Referral written to ophthalmology

## 2025-02-17 NOTE — Clinical Note
REFERRAL APPROVAL NOTICE         Sent on February 17, 2025                   Alexandr Vincent  [address]  [place] [address]                   Dear Mr. Vincent,    After a careful review of the medical information and benefit coverage, [facility] has processed your referral. See below for additional details.    If applicable, you must be actively enrolled with your insurance for coverage of the authorized service. If you have any questions regarding your coverage, please contact your insurance directly.    REFERRAL INFORMATION   Referral #:  [id number]  Referred-To Provider    Referred-By Provider:  Ophthalmology    ULISES Mcdaniel KEVIN M      [address]  [place] [address]  [phone] [address] [place] [id number] [phone]    Referral Start Date:  02/12/2025  Referral End Date:   02/12/2026             SCHEDULING  If you do not already have an appointment, please call [phone] to make an appointment.     MORE INFORMATION  If you do not already have a [email or web address] account, sign up at: [email or web address]  You can access your medical information, make appointments, see lab results, billing information, and more.  If you have questions regarding this referral, please contact  the [facility] Referrals department at:             [phone]. Monday - Friday 8:00AM - 5:00PM.     Sincerely,    [facility]

## 2025-02-18 ENCOUNTER — HOSPITAL ENCOUNTER (OUTPATIENT)
Dept: RADIOLOGY | Facility: MEDICAL CENTER | Age: 89
End: 2025-02-18
Attending: PSYCHIATRY & NEUROLOGY
Payer: MEDICARE

## 2025-02-18 DIAGNOSIS — E85.4 CEREBRAL AMYLOID ANGIOPATHY (HCC): ICD-10-CM

## 2025-02-18 DIAGNOSIS — I68.0 CEREBRAL AMYLOID ANGIOPATHY (HCC): ICD-10-CM

## 2025-02-18 DIAGNOSIS — I61.9 CVA (CEREBROVASCULAR ACCIDENT DUE TO INTRACEREBRAL HEMORRHAGE) (HCC): ICD-10-CM

## 2025-02-18 PROCEDURE — 70450 CT HEAD/BRAIN W/O DYE: CPT

## 2025-03-12 RX ORDER — OMEPRAZOLE 20 MG/1
CAPSULE, DELAYED RELEASE ORAL
Qty: 90 CAPSULE | Refills: 3 | Status: SHIPPED | OUTPATIENT
Start: 2025-03-12

## 2025-03-12 NOTE — TELEPHONE ENCOUNTER
Received request via: Pharmacy    Was the patient seen in the last year in this department? Yes    Does the patient have an active prescription (recently filled or refills available) for medication(s) requested? No    Pharmacy Name: [facility]    Does the patient have [facility] Plus and need 100-day supply? (This applies to ALL medications) Patient does not have SCP

## 2025-04-09 ENCOUNTER — APPOINTMENT (OUTPATIENT)
Dept: ADMISSIONS | Facility: MEDICAL CENTER | Age: 89
End: 2025-04-09
Attending: HOSPITALIST
Payer: MEDICARE

## 2025-04-14 ENCOUNTER — TELEPHONE (OUTPATIENT)
Dept: NEUROLOGY | Facility: MEDICAL CENTER | Age: 89
End: 2025-04-14
Payer: MEDICARE

## 2025-04-15 ENCOUNTER — PRE-ADMISSION TESTING (OUTPATIENT)
Dept: ADMISSIONS | Facility: MEDICAL CENTER | Age: 89
End: 2025-04-15
Attending: HOSPITALIST
Payer: MEDICARE

## 2025-04-15 RX ORDER — UBIDECARENONE 75 MG
1000 CAPSULE ORAL DAILY
COMMUNITY

## 2025-04-15 NOTE — OR NURSING
Pre admit phone call completed with patient and his spouse Evelin. They state understanding of all instructions. Chart was updated.

## 2025-04-16 ENCOUNTER — HOSPITAL ENCOUNTER (OUTPATIENT)
Facility: MEDICAL CENTER | Age: 89
End: 2025-04-16
Attending: INTERNAL MEDICINE | Admitting: INTERNAL MEDICINE
Payer: MEDICARE

## 2025-04-16 VITALS
RESPIRATION RATE: 20 BRPM | SYSTOLIC BLOOD PRESSURE: 145 MMHG | HEIGHT: 67 IN | HEART RATE: 71 BPM | TEMPERATURE: 97.8 F | BODY MASS INDEX: 24.33 KG/M2 | WEIGHT: 154.98 LBS | DIASTOLIC BLOOD PRESSURE: 76 MMHG | OXYGEN SATURATION: 93 %

## 2025-04-16 DIAGNOSIS — D69.6 THROMBOCYTOPENIA (HCC): ICD-10-CM

## 2025-04-16 LAB
ANISOCYTOSIS BLD QL SMEAR: ABNORMAL
BASOPHILS # BLD AUTO: 0 % (ref 0–1.8)
BASOPHILS # BLD: 0 K/UL (ref 0–0.12)
EOSINOPHIL # BLD AUTO: 0.07 K/UL (ref 0–0.51)
EOSINOPHIL NFR BLD: 0.9 % (ref 0–6.9)
ERYTHROCYTE [DISTWIDTH] IN BLOOD BY AUTOMATED COUNT: 44.8 FL (ref 35.9–50)
HCT VFR BLD AUTO: 53.5 % (ref 42–52)
HGB BLD-MCNC: 18 G/DL (ref 14–18)
HGB RETIC QN AUTO: 34.3 PG/CELL (ref 29–35)
IMM RETICS NFR: 11.9 % (ref 2.6–16.1)
LYMPHOCYTES # BLD AUTO: 2.32 K/UL (ref 1–4.8)
LYMPHOCYTES NFR BLD: 28.7 % (ref 22–41)
MANUAL DIFF BLD: NORMAL
MCH RBC QN AUTO: 30.5 PG (ref 27–33)
MCHC RBC AUTO-ENTMCNC: 33.6 G/DL (ref 32.3–36.5)
MCV RBC AUTO: 90.7 FL (ref 81.4–97.8)
MICROCYTES BLD QL SMEAR: ABNORMAL
MONOCYTES # BLD AUTO: 2.75 K/UL (ref 0–0.85)
MONOCYTES NFR BLD AUTO: 33.9 % (ref 0–13.4)
MORPHOLOGY BLD-IMP: NORMAL
NEUTROPHILS # BLD AUTO: 2.96 K/UL (ref 1.82–7.42)
NEUTROPHILS NFR BLD: 35.6 % (ref 44–72)
NEUTS BAND NFR BLD MANUAL: 0.9 % (ref 0–10)
NRBC # BLD AUTO: 0 K/UL
NRBC BLD-RTO: 0 /100 WBC (ref 0–0.2)
PATHOLOGY CONSULT NOTE: NORMAL
PLATELET # BLD AUTO: 122 K/UL (ref 164–446)
PLATELET BLD QL SMEAR: NORMAL
PMV BLD AUTO: 10.9 FL (ref 9–12.9)
RBC # BLD AUTO: 5.9 M/UL (ref 4.7–6.1)
RBC BLD AUTO: PRESENT
RETICS # AUTO: 0.09 M/UL (ref 0.04–0.12)
RETICS/RBC NFR: 1.5 % (ref 0.8–2.6)
WBC # BLD AUTO: 8.1 K/UL (ref 4.8–10.8)

## 2025-04-16 PROCEDURE — 36415 COLL VENOUS BLD VENIPUNCTURE: CPT

## 2025-04-16 PROCEDURE — 85027 COMPLETE CBC AUTOMATED: CPT

## 2025-04-16 PROCEDURE — 160015 HCHG STAT PREOP MINUTES: Performed by: HOSPITALIST

## 2025-04-16 PROCEDURE — 160027 HCHG SURGERY MINUTES - 1ST 30 MINS LEVEL 2: Performed by: HOSPITALIST

## 2025-04-16 PROCEDURE — 160048 HCHG OR STATISTICAL LEVEL 1-5: Performed by: HOSPITALIST

## 2025-04-16 PROCEDURE — 160025 RECOVERY II MINUTES (STATS): Performed by: HOSPITALIST

## 2025-04-16 PROCEDURE — 85007 BL SMEAR W/DIFF WBC COUNT: CPT

## 2025-04-16 PROCEDURE — 99152 MOD SED SAME PHYS/QHP 5/>YRS: CPT | Performed by: HOSPITALIST

## 2025-04-16 PROCEDURE — 160046 HCHG PACU - 1ST 60 MINS PHASE II: Performed by: HOSPITALIST

## 2025-04-16 PROCEDURE — 85046 RETICYTE/HGB CONCENTRATE: CPT

## 2025-04-16 PROCEDURE — 700111 HCHG RX REV CODE 636 W/ 250 OVERRIDE (IP): Mod: JZ | Performed by: HOSPITALIST

## 2025-04-16 PROCEDURE — 38222 DX BONE MARROW BX & ASPIR: CPT | Performed by: HOSPITALIST

## 2025-04-16 RX ORDER — SODIUM CHLORIDE, SODIUM LACTATE, POTASSIUM CHLORIDE, CALCIUM CHLORIDE 600; 310; 30; 20 MG/100ML; MG/100ML; MG/100ML; MG/100ML
INJECTION, SOLUTION INTRAVENOUS CONTINUOUS
Status: DISCONTINUED | OUTPATIENT
Start: 2025-04-16 | End: 2025-04-16 | Stop reason: HOSPADM

## 2025-04-16 RX ORDER — MIDAZOLAM HYDROCHLORIDE 1 MG/ML
.5-2 INJECTION INTRAMUSCULAR; INTRAVENOUS PRN
Status: DISCONTINUED | OUTPATIENT
Start: 2025-04-16 | End: 2025-04-16 | Stop reason: HOSPADM

## 2025-04-16 RX ORDER — SODIUM CHLORIDE 9 MG/ML
500 INJECTION, SOLUTION INTRAVENOUS
Status: DISCONTINUED | OUTPATIENT
Start: 2025-04-16 | End: 2025-04-16 | Stop reason: HOSPADM

## 2025-04-16 RX ORDER — MIDAZOLAM HYDROCHLORIDE 1 MG/ML
INJECTION INTRAMUSCULAR; INTRAVENOUS
Status: COMPLETED
Start: 2025-04-16 | End: 2025-04-16

## 2025-04-16 ASSESSMENT — FIBROSIS 4 INDEX: FIB4 SCORE: 2.75

## 2025-04-16 ASSESSMENT — PAIN DESCRIPTION - PAIN TYPE
TYPE: SURGICAL PAIN

## 2025-04-16 NOTE — OR NURSING
1205-Pt arrive to PACU. Report received from [facility]. Pt with bandaid to left hip, CDI. Pt denies pain or nausea.     1215-Pt given sips of water    1222-Wife to bedside.    1230-DC instructions reviewed with pt and wife, all questions answered, verbalized understanding.     1235-Crackers and juice given, pt tolerating.    1303-IV removed, tip intact, pressure dressing applied.Pt escorted out of department in wheelchair with all belongings. Discharged home to responsible adult.

## 2025-04-16 NOTE — DISCHARGE INSTRUCTIONS
BONE MARROW ASPIRATION & BIOPSY DISCHARGE INSTRUCTIONS    After the numbing medicine wears off, you may feel some discomfort.    Keep bandage clean and dry for 24 hours.  After this time, you can change the bandage.  You may now bathe or shower.    If bleeding occurs after your bone marrow aspiration or biopsy, apply pressure to the area and call your doctor immediately.    Call your doctor if pain persists for greater than 24 hours in the area where you had your aspiration or biopsy.    Call your doctor immediately if you notice redness or drainage in the area or if you have a fever.    Call your doctor if you have numbness or weakness in the area where the doctor took the bone marrow or down your leg.    Do not drive or drink alcohol for 24 hours if you have had sedation medication.  The medication will make you drowsy.    Resume your regular diet.    Follow up with your doctor, Dr. Keenan at [phone]    Additional instructions: Results can take up to 7-10 days.         I acknowledge receipt and understanding of these Home Care Instructions.

## 2025-04-16 NOTE — PROCEDURES
Bone Marrow Biopsy/Aspiration    Date/Time: 4/16/2025 12:22 PM    Performed by: Ted Fernando D.O.  Authorized by: Ted Fernando D.O.    Consent:     Consent obtained:  Verbal    Risks discussed:  Bleeding, infection, pain and repeat procedure    Alternatives discussed:  No treatment  Universal protocol:     Procedure explained and questions answered to patient or proxy's satisfaction: yes      Relevant documents present and verified: yes      Test results available and properly labeled: yes      Imaging studies available: yes      Required blood products, implants, devices, and special equipment available: yes      Immediately prior to procedure a time out was called: yes      Site/side marked: yes      Patient identity confirmed:  Verbally with patient and hospital-assigned identification number  Pre-procedure details:     Procedure type:  Aspiration and biopsy    Requesting physician:  Shlomo    Indications:  Thrombocytopenia    Position:  Prone    Buttock laterality:  Left    Local anesthetic:  1% Lidocaine    Subcutaneous volume:  1 mL    Periosteum anesthetic volume:  4 mL    Preparation: Patient was prepped and draped in usual sterile fashion    Sedation:     Patient Sedated: Yes      Sedation type: moderate (conscious) sedation      Sedation:  Midazolam    Sedation Dosage:  3    Analgesia:  Fentanyl    Analgesia Dosage:  75    Sedation Start Time:  11:48 PDT    Sedation Stop Time:  12:02 PDT    Moderate sedation charge selection based on time above is:  15 minutes      I was personally present and supervised the patient throughout the entirety of the moderate sedation time mentioned above.  Procedure details:     Aspirate obtained:  5 mL followed by 5 mL    Blood cultures collected:  None    Biopsy performed:  2 cores    Number of attempts:  2    Estimated blood loss (mL):  1  Post-procedure:     Puncture site:  Adhesive bandage applied    Patient tolerance of procedure:  Tolerated well,  no immediate complications

## 2025-05-01 ENCOUNTER — APPOINTMENT (OUTPATIENT)
Dept: MEDICAL GROUP | Facility: LAB | Age: 89
End: 2025-05-01
Payer: MEDICARE

## 2025-05-01 VITALS
HEIGHT: 67 IN | SYSTOLIC BLOOD PRESSURE: 116 MMHG | RESPIRATION RATE: 16 BRPM | DIASTOLIC BLOOD PRESSURE: 60 MMHG | OXYGEN SATURATION: 92 % | HEART RATE: 64 BPM | BODY MASS INDEX: 24.96 KG/M2 | WEIGHT: 159 LBS | TEMPERATURE: 97.1 F

## 2025-05-01 DIAGNOSIS — M79.642 PAIN IN BOTH HANDS: ICD-10-CM

## 2025-05-01 DIAGNOSIS — M79.641 PAIN IN BOTH HANDS: ICD-10-CM

## 2025-05-01 PROCEDURE — 99213 OFFICE O/P EST LOW 20 MIN: CPT | Performed by: INTERNAL MEDICINE

## 2025-05-01 PROCEDURE — 3074F SYST BP LT 130 MM HG: CPT | Performed by: INTERNAL MEDICINE

## 2025-05-01 PROCEDURE — 3078F DIAST BP <80 MM HG: CPT | Performed by: INTERNAL MEDICINE

## 2025-05-01 ASSESSMENT — FIBROSIS 4 INDEX: FIB4 SCORE: 2.55

## 2025-05-02 NOTE — PROGRESS NOTES
CC: Elias Vincent is a 89 y.o. male is suffering from   Chief Complaint   Patient presents with    Follow-Up         SUBJECTIVE:  1. 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  Social History     Tobacco Use    Smoking status: Never    Smokeless tobacco: Never   Vaping Use    Vaping status: Never Used   Substance Use Topics    Alcohol use: No    Drug use: No         MEDICATIONS:    Current Outpatient Medications:     cyanocobalamin (VITAMIN B-12) 100 MCG Tab, Take 1,000 mcg by mouth every day., Disp: , Rfl:     omeprazole (PRILOSEC) 20 MG delayed-release capsule, TAKE 1 CAPSULE DAILY FOR HEARTBURN, Disp: 90 Capsule, Rfl: 3    amLODIPine (NORVASC) 2.5 MG Tab, Take 1 Tablet by mouth every day., Disp: 100 Tablet, Rfl: 3    traZODone (DESYREL) 50 MG Tab, Take 1 Tablet by mouth every evening., Disp: 90 Tablet, Rfl: 3    Flaxseed, Linseed, (FLAXSEED OIL) 1000 MG Cap, Take 1,000 mg by mouth every day., Disp: , Rfl:     latanoprost (XALATAN) 0.005 % Solution, Administer 1 Drop into both eyes every evening., Disp: , Rfl:     Omega-3 Fatty Acids (FISH OIL) 1000 MG Cap capsule, Take 1,000 mg by mouth every day., Disp: , Rfl:     vitamin D3 (CHOLECALCIFEROL) 1000 Unit (25 mcg) Tab, Take 1,000 Units by mouth every day., Disp: , Rfl:     tamsulosin (FLOMAX) 0.4 MG capsule, TAKE 1 CAPSULE 1/2 HOUR AFTER BREAKFAST, Disp: 90 Capsule, Rfl: 3    atorvastatin (LIPITOR) 10 MG Tab, TAKE 1 TABLET DAILY FOR HIGH AMOUNT OF FATS IN THE BLOOD, Disp: 90 Tablet, Rfl: 3    multivitamin (THERAGRAN) Tab, Take 1 Tab by mouth every day., Disp: , Rfl:     PROBLEMS:  Patient Active Problem List    Diagnosis Date Noted    Serum total bilirubin elevated 01/23/2025    CVA (cerebrovascular accident due to intracerebral  "hemorrhage) ([facility]) 01/21/2025    Cerebral amyloid angiopathy (HCC) 10/31/2023    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  Heart: No chest pain.  Lungs:  No shortness of Breath.  Psychological: Juan unusual Anxiety depression     PHYSICAL EXAM   Physical Exam             Constitutional: Alert, cooperative, not in acute distress.  Cardiovascular:  Rate Rhythm is regular without murmurs rubs clicks.     Thorax & Lungs: Clear to auscultation, no wheezing, rhonchi, or rales  HENT: Normocephalic, Atraumatic.  Eyes: PERRLA, EOMI, Conjunctiva normal.   Neck: Trachia is midline no swelling of the thyroid.   Lymphatic: No lymphadenopathy noted.     Musculoskeletal: Negative Tinel's sign, no obvious swelling of the interphalangeal joints  Neurologic: Alert & oriented x 3, cranial nerves II through XII are intact, Normal motor function, Normal sensory function, No focal deficits noted.   Psychiatric: Affect normal, Judgment normal, Mood normal.     VITAL SIGNS:/60 (BP Location: Left arm, Patient Position: Sitting, BP Cuff Size: Adult)   Pulse 64   Temp 36.2 °C (97.1 °F) (Temporal)   Resp 16   Ht 1.702 m (5' 7\")   Wt 72.1 kg (159 lb)   SpO2 92%   BMI 24.90 kg/m²     Labs: Reviewed    Assessment:                                                     Plan:  [unfilled]             1. Pain in both hands  X-rays of both hands ordered, check sedimentation rate JOSE G CRP rheumatoid arthritis factor CCP, referral to Dr. Fernandez if necessary  - DX-JOINT SURVEY-HANDS SINGLE VIEW; Future  - Sed " Rate; Future  - JOSE G REFLEXIVE PROFILE; Future  - CRP QUANTITIVE (NON-CARDIAC); Future  - RHEUMATOID ARTHRITIS FACTOR; Future  - CCP

## 2025-05-15 ENCOUNTER — HOSPITAL ENCOUNTER (OUTPATIENT)
Dept: RADIOLOGY | Facility: MEDICAL CENTER | Age: 89
End: 2025-05-15
Attending: INTERNAL MEDICINE
Payer: MEDICARE

## 2025-05-15 DIAGNOSIS — M79.642 PAIN IN BOTH HANDS: ICD-10-CM

## 2025-05-15 DIAGNOSIS — M79.641 PAIN IN BOTH HANDS: ICD-10-CM

## 2025-05-15 PROCEDURE — 77077 JOINT SURVEY SINGLE VIEW: CPT

## 2025-05-19 ENCOUNTER — RESULTS FOLLOW-UP (OUTPATIENT)
Dept: MEDICAL GROUP | Facility: LAB | Age: 89
End: 2025-05-19

## 2025-05-22 LAB
ANA SER QL IF: NEGATIVE
CCP IGA+IGG SERPL IA-ACNC: 12 UNITS (ref 0–19)
CRP SERPL-MCNC: <1 MG/L (ref 0–10)
ERYTHROCYTE [SEDIMENTATION RATE] IN BLOOD BY WESTERGREN METHOD: 2 MM/HR (ref 0–30)
RHEUMATOID FACT SERPL-ACNC: <10 IU/ML

## 2025-06-11 RX ORDER — ATORVASTATIN CALCIUM 10 MG/1
TABLET, FILM COATED ORAL
Qty: 90 TABLET | Refills: 3 | Status: SHIPPED | OUTPATIENT
Start: 2025-06-11

## 2025-06-11 NOTE — TELEPHONE ENCOUNTER
Received request via: Pharmacy    Was the patient seen in the last year in this department? Yes    Does the patient have an active prescription (recently filled or refills available) for medication(s) requested? No    Pharm[facility] Name: [facility]    Does the patient have [facility] Plus and need 100-day supply? (This applies to ALL medications) Patient does not have SCP

## 2025-07-03 ENCOUNTER — APPOINTMENT (OUTPATIENT)
Dept: MEDICAL GROUP | Facility: LAB | Age: 89
End: 2025-07-03
Payer: MEDICARE

## 2025-07-03 VITALS
OXYGEN SATURATION: 95 % | DIASTOLIC BLOOD PRESSURE: 62 MMHG | HEART RATE: 60 BPM | TEMPERATURE: 97.6 F | HEIGHT: 67 IN | SYSTOLIC BLOOD PRESSURE: 110 MMHG | BODY MASS INDEX: 24.19 KG/M2 | RESPIRATION RATE: 14 BRPM | WEIGHT: 154.1 LBS

## 2025-07-03 DIAGNOSIS — I68.0 CEREBRAL AMYLOID ANGIOPATHY (HCC): ICD-10-CM

## 2025-07-03 DIAGNOSIS — C93.10 CHRONIC MYELOMONOCYTIC LEUKEMIA NOT HAVING ACHIEVED REMISSION (HCC): Primary | ICD-10-CM

## 2025-07-03 DIAGNOSIS — I69.120 APHASIA DUE TO OLD INTRACEREBRAL HEMORRHAGE: ICD-10-CM

## 2025-07-03 DIAGNOSIS — E85.4 CEREBRAL AMYLOID ANGIOPATHY (HCC): ICD-10-CM

## 2025-07-03 PROCEDURE — 3074F SYST BP LT 130 MM HG: CPT | Performed by: INTERNAL MEDICINE

## 2025-07-03 PROCEDURE — 3078F DIAST BP <80 MM HG: CPT | Performed by: INTERNAL MEDICINE

## 2025-07-03 PROCEDURE — 99214 OFFICE O/P EST MOD 30 MIN: CPT | Performed by: INTERNAL MEDICINE

## 2025-07-03 RX ORDER — HYDROXYUREA 500 MG/1
500 CAPSULE ORAL
COMMUNITY
Start: 2025-06-24

## 2025-07-03 ASSESSMENT — FIBROSIS 4 INDEX: FIB4 SCORE: 2.55

## 2025-07-03 NOTE — PROGRESS NOTES
CC: Elias Vincent is a 89 y.o. male is suffering from   Chief Complaint   Patient presents with    Results    Lab Results         SUBJECTIVE:  1. 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 Keenan as having CMML, patient appears to be stable at this point    2. Cerebral amyloid angiopathy (HCC)  History of cerebral amyloid angiopathy    3. 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) 07/03/2025    Serum total bilirubin elevated 01/23/2025    CVA (cerebrovascular accident due to intracerebral hemorrhage) ([facility]) 01/21/2025    Cerebral amyloid angiopathy (HCC) 10/31/2023    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  Heart: No chest pain.  Lungs:  No shortness of Breath.  Psychological: Juan unusual Anxiety depression     PHYSICAL EXAM   Physical Exam             Constitutional: Alert, cooperative, not in acute distress.  Cardiovascular:  Rate Rhythm is regular without murmurs rubs clicks.     Thorax & Lungs: Clear to auscultation, no wheezing, rhonchi, or rales  HENT: Normocephalic, Atraumatic.  Eyes: PERRLA, EOMI, Conjunctiva normal.   Neck: Trachia is  "midline no swelling of the thyroid.   Lymphatic: No lymphadenopathy noted.   Neurologic: Alert & oriented x 3, cranial nerves II through XII are intact, Normal motor function, Normal sensory function, No focal deficits noted.   Psychiatric: Affect normal, Judgment normal, Mood normal.     VITAL SIGNS:/62 (BP Location: Left arm, Patient Position: Sitting, BP Cuff Size: Adult)   Pulse 60   Temp 36.4 °C (97.6 °F) (Temporal)   Resp 14   Ht 1.702 m (5' 7\")   Wt 69.9 kg (154 lb 1.6 oz)   SpO2 95%   BMI 24.14 kg/m²     Labs: Reviewed    Assessment:                                                     Plan:  [unfilled]             1. 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 Cerebral amyloid angiopathy (HCC)  Clinically stable    3. 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  No change in medical therapy continued follow-up with neurology on a as needed basis             [1]   Social History  Tobacco Use    Smoking status: Never    Smokeless tobacco: Never   Vaping Use    Vaping status: Never Used   Substance Use Topics    Alcohol use: No    Drug use: No   [2]   Current Outpatient Medications:     hydroxyurea (HYDREA) 500 MG Cap, Take 500 mg by mouth., Disp: , Rfl:     atorvastatin (LIPITOR) 10 MG Tab, TAKE 1 TABLET DAILY FOR HIGH AMOUNT OF FATS IN THE BLOOD, Disp: 90 Tablet, Rfl: 3    cyanocobalamin (VITAMIN B-12) 100 MCG Tab, Take 1,000 mcg by mouth every day., Disp: , Rfl:     omeprazole (PRILOSEC) 20 MG delayed-release capsule, TAKE 1 CAPSULE DAILY FOR HEARTBURN, Disp: 90 Capsule, Rfl: 3    amLODIPine (NORVASC) 2.5 MG Tab, Take 1 Tablet by mouth every day., Disp: 100 Tablet, Rfl: 3    traZODone (DESYREL) 50 MG Tab, Take 1 Tablet by mouth every evening., Disp: 90 Tablet, Rfl: 3    Flaxseed, Linseed, " (FLAXSEED OIL) 1000 MG Cap, Take 1,000 mg by mouth every day., Disp: , Rfl:     latanoprost (XALATAN) 0.005 % Solution, Administer 1 Drop into both eyes every evening., Disp: , Rfl:     Omega-3 Fatty Acids (FISH OIL) 1000 MG Cap capsule, Take 1,000 mg by mouth every day., Disp: , Rfl:     vitamin D3 (CHOLECALCIFEROL) 1000 Unit (25 mcg) Tab, Take 1,000 Units by mouth every day., Disp: , Rfl:     tamsulosin (FLOMAX) 0.4 MG capsule, TAKE 1 CAPSULE 1/2 HOUR AFTER BREAKFAST, Disp: 90 Capsule, Rfl: 3    multivitamin (THERAGRAN) Tab, Take 1 Tab by mouth every day., Disp: , Rfl:

## (undated) DEVICE — SET LEADWIRE 5 LEAD BEDSIDE DISPOSABLE ECG (1SET OF 5/EA)

## (undated) DEVICE — SOD. CHL 10CC SYRINGE PREFILL - W/10 CC (30/BX)

## (undated) DEVICE — TUBING CLEARLINK DUO-VENT - C-FLO (48EA/CA)

## (undated) DEVICE — CANNULA DIVIDED ADULT CO2 - SAMPLE W/FEMALE CONNCT (25/CA)

## (undated) DEVICE — ELECTRODE 850 FOAM ADHESIVE - HYDROGEL RADIOTRNSPRNT (50/PK)

## (undated) DEVICE — BANDAID SHEER STRIP 3/4 IN (100EA/BX 12BX/CA)

## (undated) DEVICE — LACTATED RINGERS INJ 1000 ML - (14EA/CA 60CA/PF)

## (undated) DEVICE — SYRINGE NON SAFETY 5 CC 20 GA X 1-1/2 IN (100/BX 4BX/CA)

## (undated) DEVICE — SYRINGE 3 CC 22 GA X 1-1/2 - NDL SAFETY (50/BX 8BX/CA) DELETE AND POINT TO PREMIER 65230

## (undated) DEVICE — SENSOR OXIMETER ADULT SPO2 RD SET (20EA/BX)

## (undated) DEVICE — SPONGE GAUZESTER 4 X 4 4PLY - (128PK/CA)